# Patient Record
Sex: FEMALE | Race: WHITE | NOT HISPANIC OR LATINO | Employment: OTHER | URBAN - METROPOLITAN AREA
[De-identification: names, ages, dates, MRNs, and addresses within clinical notes are randomized per-mention and may not be internally consistent; named-entity substitution may affect disease eponyms.]

---

## 2017-02-21 RX ORDER — ROSUVASTATIN CALCIUM 10 MG/1
TABLET, COATED ORAL
Qty: 90 TABLET | Refills: 0 | Status: SHIPPED | OUTPATIENT
Start: 2017-02-21 | End: 2017-05-17 | Stop reason: SDUPTHER

## 2017-05-17 RX ORDER — VERAPAMIL HYDROCHLORIDE 240 MG/1
CAPSULE, EXTENDED RELEASE ORAL
Qty: 90 CAPSULE | Refills: 1 | Status: SHIPPED | OUTPATIENT
Start: 2017-05-17 | End: 2017-11-16 | Stop reason: SDUPTHER

## 2017-05-17 RX ORDER — ROSUVASTATIN CALCIUM 10 MG/1
TABLET, COATED ORAL
Qty: 90 TABLET | Refills: 3 | Status: SHIPPED | OUTPATIENT
Start: 2017-05-17 | End: 2018-07-01 | Stop reason: SDUPTHER

## 2017-05-18 ENCOUNTER — OFFICE VISIT (OUTPATIENT)
Dept: SPORTS MEDICINE | Facility: CLINIC | Age: 67
End: 2017-05-18

## 2017-05-18 VITALS
SYSTOLIC BLOOD PRESSURE: 112 MMHG | DIASTOLIC BLOOD PRESSURE: 74 MMHG | BODY MASS INDEX: 23.92 KG/M2 | HEART RATE: 68 BPM | OXYGEN SATURATION: 98 % | WEIGHT: 135 LBS | TEMPERATURE: 98.1 F | HEIGHT: 63 IN

## 2017-05-18 DIAGNOSIS — N39.0 RECURRENT UTI: ICD-10-CM

## 2017-05-18 DIAGNOSIS — N39.0 URINARY TRACT INFECTION, SITE UNSPECIFIED: Primary | ICD-10-CM

## 2017-05-18 LAB
BILIRUB BLD-MCNC: NEGATIVE MG/DL
CLARITY, POC: CLEAR
COLOR UR: YELLOW
GLUCOSE UR STRIP-MCNC: NEGATIVE MG/DL
KETONES UR QL: NEGATIVE
LEUKOCYTE EST, POC: NEGATIVE
NITRITE UR-MCNC: NEGATIVE MG/ML
PH UR: 5 [PH] (ref 5–8)
PROT UR STRIP-MCNC: NEGATIVE MG/DL
RBC # UR STRIP: ABNORMAL /UL
SP GR UR: 1.01 (ref 1–1.03)
UROBILINOGEN UR QL: NORMAL

## 2017-05-18 PROCEDURE — 81003 URINALYSIS AUTO W/O SCOPE: CPT | Performed by: FAMILY MEDICINE

## 2017-05-18 PROCEDURE — 99213 OFFICE O/P EST LOW 20 MIN: CPT | Performed by: FAMILY MEDICINE

## 2017-05-30 ENCOUNTER — LAB (OUTPATIENT)
Dept: SPORTS MEDICINE | Facility: CLINIC | Age: 67
End: 2017-05-30

## 2017-05-30 DIAGNOSIS — R35.0 FREQUENT URINATION: Primary | ICD-10-CM

## 2017-05-30 DIAGNOSIS — R35.0 FREQUENT URINATION: ICD-10-CM

## 2017-05-31 LAB
APPEARANCE UR: CLEAR
BACTERIA #/AREA URNS HPF: ABNORMAL /HPF
BILIRUB UR QL STRIP: NEGATIVE
CASTS URNS MICRO: ABNORMAL
CASTS URNS QL MICRO: PRESENT /LPF
COLOR UR: YELLOW
EPI CELLS #/AREA URNS HPF: ABNORMAL /HPF
GLUCOSE UR QL: NEGATIVE
HGB UR QL STRIP: NEGATIVE
KETONES UR QL STRIP: NEGATIVE
LEUKOCYTE ESTERASE UR QL STRIP: NEGATIVE
MICRO URNS: NORMAL
MICRO URNS: NORMAL
MUCOUS THREADS URNS QL MICRO: PRESENT /HPF
NITRITE UR QL STRIP: NEGATIVE
PH UR STRIP: 6 [PH] (ref 5–7.5)
PROT UR QL STRIP: NEGATIVE
RBC #/AREA URNS HPF: ABNORMAL /HPF
SP GR UR: 1.01 (ref 1–1.03)
URINALYSIS REFLEX: NORMAL
UROBILINOGEN UR STRIP-MCNC: 0.2 MG/DL (ref 0.2–1)
WBC #/AREA URNS HPF: ABNORMAL /HPF

## 2017-08-08 RX ORDER — SPIRONOLACTONE 50 MG/1
TABLET, FILM COATED ORAL
Qty: 90 TABLET | Refills: 1 | Status: SHIPPED | OUTPATIENT
Start: 2017-08-08 | End: 2018-02-11 | Stop reason: SDUPTHER

## 2017-08-08 RX ORDER — BUPROPION HYDROCHLORIDE 150 MG/1
TABLET ORAL
Qty: 90 TABLET | Refills: 1 | Status: SHIPPED | OUTPATIENT
Start: 2017-08-08 | End: 2018-02-11 | Stop reason: SDUPTHER

## 2017-10-12 ENCOUNTER — OFFICE VISIT (OUTPATIENT)
Dept: SPORTS MEDICINE | Facility: CLINIC | Age: 67
End: 2017-10-12

## 2017-10-12 VITALS
HEART RATE: 67 BPM | DIASTOLIC BLOOD PRESSURE: 74 MMHG | OXYGEN SATURATION: 98 % | SYSTOLIC BLOOD PRESSURE: 122 MMHG | HEIGHT: 63 IN | BODY MASS INDEX: 24.27 KG/M2 | WEIGHT: 137 LBS

## 2017-10-12 DIAGNOSIS — R30.0 BURNING WITH URINATION: Primary | ICD-10-CM

## 2017-10-12 LAB
BILIRUB BLD-MCNC: NEGATIVE MG/DL
CLARITY, POC: CLEAR
COLOR UR: YELLOW
GLUCOSE UR STRIP-MCNC: NEGATIVE MG/DL
KETONES UR QL: NEGATIVE
LEUKOCYTE EST, POC: NEGATIVE
NITRITE UR-MCNC: NEGATIVE MG/ML
PH UR: 5 [PH] (ref 5–8)
PROT UR STRIP-MCNC: NEGATIVE MG/DL
RBC # UR STRIP: ABNORMAL /UL
SP GR UR: 1.02 (ref 1–1.03)
UROBILINOGEN UR QL: NORMAL

## 2017-10-12 PROCEDURE — 99213 OFFICE O/P EST LOW 20 MIN: CPT | Performed by: FAMILY MEDICINE

## 2017-10-12 PROCEDURE — 81003 URINALYSIS AUTO W/O SCOPE: CPT | Performed by: FAMILY MEDICINE

## 2017-10-12 RX ORDER — CIPROFLOXACIN 500 MG/1
500 TABLET, FILM COATED ORAL 2 TIMES DAILY
Qty: 20 TABLET | Refills: 1 | Status: SHIPPED | OUTPATIENT
Start: 2017-10-12 | End: 2017-10-31

## 2017-10-12 NOTE — PROGRESS NOTES
"Karena is a 67 y.o. year old female    CC: UTI    History of Present Illness   HPI     Patient began having symptoms of UTI last week, had a few tablets of Cipro left over from her previous infection.  She has felt somewhat better however not completely well.  No fever or chills.  Has dysuria, frequency.  No back pain.      I have reviewed the patient's medical, family, and social history in detail and updated the computerized patient record.    Review of Systems   Constitutional: Negative.    HENT: Negative.    Respiratory: Negative.    Cardiovascular: Negative.    Genitourinary: Positive for dysuria and frequency.       /74  Pulse 67  Ht 63\" (160 cm)  Wt 137 lb (62.1 kg)  SpO2 98%  BMI 24.27 kg/m2     Physical Exam   Constitutional: She is oriented to person, place, and time. She appears well-developed and well-nourished.   HENT:   Head: Normocephalic and atraumatic.   Eyes: Conjunctivae and EOM are normal. Pupils are equal, round, and reactive to light.   Cardiovascular:   No peripheral edema   Pulmonary/Chest: Effort normal.   Neurological: She is alert and oriented to person, place, and time.   Skin: Skin is warm and dry.   Psychiatric: She has a normal mood and affect. Her behavior is normal.   Vitals reviewed.   urine dip here essentially normal.     Diagnoses and all orders for this visit:    Burning with urination  -     POCT urinalysis dipstick, automated  -     Urine Culture - Urine, Urine, Clean Catch  -     ciprofloxacin (CIPRO) 500 MG tablet; Take 1 tablet by mouth 2 (Two) Times a Day.      Subclinical UTI, will refill Cipro but also send urine for culture.    EMR Dragon/Transcription disclaimer:    Much of this encounter note is an electronic transcription/translation of spoken language to printed text.  The electronic translation of spoken language may permit erroneous, or at times, nonsensical words or phrases to be inadvertently transcribed.  Although I have reviewed the note for such " errors some may still exist.

## 2017-10-14 LAB
BACTERIA UR CULT: NO GROWTH
BACTERIA UR CULT: NORMAL

## 2017-10-20 DIAGNOSIS — Z11.59 NEED FOR HEPATITIS C SCREENING TEST: ICD-10-CM

## 2017-10-20 DIAGNOSIS — Z13.29 SCREENING FOR THYROID DISORDER: ICD-10-CM

## 2017-10-20 DIAGNOSIS — Z00.00 PREVENTATIVE HEALTH CARE: Primary | ICD-10-CM

## 2017-10-20 DIAGNOSIS — E78.5 HYPERLIPIDEMIA, UNSPECIFIED HYPERLIPIDEMIA TYPE: ICD-10-CM

## 2017-10-20 DIAGNOSIS — I10 ESSENTIAL HYPERTENSION: ICD-10-CM

## 2017-10-24 LAB
ALBUMIN SERPL-MCNC: 4.7 G/DL (ref 3.5–5.2)
ALBUMIN/GLOB SERPL: 2 G/DL
ALP SERPL-CCNC: 94 U/L (ref 39–117)
ALT SERPL-CCNC: 14 U/L (ref 1–33)
APPEARANCE UR: CLEAR
AST SERPL-CCNC: 17 U/L (ref 1–32)
BACTERIA #/AREA URNS HPF: ABNORMAL /HPF
BASOPHILS # BLD AUTO: 0.05 10*3/MM3 (ref 0–0.2)
BASOPHILS NFR BLD AUTO: 0.7 % (ref 0–1.5)
BILIRUB SERPL-MCNC: 0.3 MG/DL (ref 0.1–1.2)
BILIRUB UR QL STRIP: NEGATIVE
BUN SERPL-MCNC: 9 MG/DL (ref 8–23)
BUN/CREAT SERPL: 10.6 (ref 7–25)
CALCIUM SERPL-MCNC: 9.9 MG/DL (ref 8.6–10.5)
CASTS URNS MICRO: ABNORMAL
CASTS URNS QL MICRO: PRESENT /LPF
CHLORIDE SERPL-SCNC: 100 MMOL/L (ref 98–107)
CHOLEST SERPL-MCNC: 137 MG/DL (ref 0–200)
CHOLEST/HDLC SERPL: 2.58 {RATIO}
CO2 SERPL-SCNC: 26.7 MMOL/L (ref 22–29)
COLOR UR: YELLOW
CREAT SERPL-MCNC: 0.85 MG/DL (ref 0.57–1)
EOSINOPHIL # BLD AUTO: 0.38 10*3/MM3 (ref 0–0.7)
EOSINOPHIL NFR BLD AUTO: 5.3 % (ref 0.3–6.2)
EPI CELLS #/AREA URNS HPF: ABNORMAL /HPF
ERYTHROCYTE [DISTWIDTH] IN BLOOD BY AUTOMATED COUNT: 13.2 % (ref 11.7–13)
GFR SERPLBLD CREATININE-BSD FMLA CKD-EPI: 66 ML/MIN/1.73
GFR SERPLBLD CREATININE-BSD FMLA CKD-EPI: 80 ML/MIN/1.73
GLOBULIN SER CALC-MCNC: 2.4 GM/DL
GLUCOSE SERPL-MCNC: 88 MG/DL (ref 65–99)
GLUCOSE UR QL: NEGATIVE
HCT VFR BLD AUTO: 46.6 % (ref 35.6–45.5)
HCV AB S/CO SERPL IA: <0.1 S/CO RATIO (ref 0–0.9)
HDLC SERPL-MCNC: 53 MG/DL (ref 40–60)
HGB BLD-MCNC: 14.4 G/DL (ref 11.9–15.5)
HGB UR QL STRIP: NEGATIVE
IMM GRANULOCYTES # BLD: 0.04 10*3/MM3 (ref 0–0.03)
IMM GRANULOCYTES NFR BLD: 0.6 % (ref 0–0.5)
KETONES UR QL STRIP: NEGATIVE
LDLC SERPL CALC-MCNC: 63 MG/DL (ref 0–100)
LEUKOCYTE ESTERASE UR QL STRIP: NEGATIVE
LYMPHOCYTES # BLD AUTO: 2.1 10*3/MM3 (ref 0.9–4.8)
LYMPHOCYTES NFR BLD AUTO: 29.3 % (ref 19.6–45.3)
MCH RBC QN AUTO: 28.1 PG (ref 26.9–32)
MCHC RBC AUTO-ENTMCNC: 30.9 G/DL (ref 32.4–36.3)
MCV RBC AUTO: 90.8 FL (ref 80.5–98.2)
MICRO URNS: NORMAL
MICRO URNS: NORMAL
MONOCYTES # BLD AUTO: 0.58 10*3/MM3 (ref 0.2–1.2)
MONOCYTES NFR BLD AUTO: 8.1 % (ref 5–12)
MUCOUS THREADS URNS QL MICRO: PRESENT /HPF
NEUTROPHILS # BLD AUTO: 4.01 10*3/MM3 (ref 1.9–8.1)
NEUTROPHILS NFR BLD AUTO: 56 % (ref 42.7–76)
NITRITE UR QL STRIP: NEGATIVE
PH UR STRIP: 6 [PH] (ref 5–7.5)
PLATELET # BLD AUTO: 277 10*3/MM3 (ref 140–500)
POTASSIUM SERPL-SCNC: 5.3 MMOL/L (ref 3.5–5.2)
PROT SERPL-MCNC: 7.1 G/DL (ref 6–8.5)
PROT UR QL STRIP: NEGATIVE
RBC # BLD AUTO: 5.13 10*6/MM3 (ref 3.9–5.2)
RBC #/AREA URNS HPF: ABNORMAL /HPF
SODIUM SERPL-SCNC: 142 MMOL/L (ref 136–145)
SP GR UR: 1.01 (ref 1–1.03)
T4 FREE SERPL-MCNC: 1.16 NG/DL (ref 0.93–1.7)
TRIGL SERPL-MCNC: 104 MG/DL (ref 0–150)
TSH SERPL DL<=0.005 MIU/L-ACNC: 2.72 MIU/ML (ref 0.27–4.2)
URINALYSIS REFLEX: NORMAL
UROBILINOGEN UR STRIP-MCNC: 0.2 MG/DL (ref 0.2–1)
VLDLC SERPL CALC-MCNC: 20.8 MG/DL (ref 5–40)
WBC # BLD AUTO: 7.16 10*3/MM3 (ref 4.5–10.7)
WBC #/AREA URNS HPF: ABNORMAL /HPF

## 2017-10-25 ENCOUNTER — RESULTS ENCOUNTER (OUTPATIENT)
Dept: SPORTS MEDICINE | Facility: CLINIC | Age: 67
End: 2017-10-25

## 2017-10-25 DIAGNOSIS — Z11.59 NEED FOR HEPATITIS C SCREENING TEST: ICD-10-CM

## 2017-10-25 DIAGNOSIS — E78.5 HYPERLIPIDEMIA, UNSPECIFIED HYPERLIPIDEMIA TYPE: ICD-10-CM

## 2017-10-25 DIAGNOSIS — I10 ESSENTIAL HYPERTENSION: ICD-10-CM

## 2017-10-25 DIAGNOSIS — Z00.00 PREVENTATIVE HEALTH CARE: ICD-10-CM

## 2017-10-25 DIAGNOSIS — Z13.29 SCREENING FOR THYROID DISORDER: ICD-10-CM

## 2017-10-31 ENCOUNTER — OFFICE VISIT (OUTPATIENT)
Dept: SPORTS MEDICINE | Facility: CLINIC | Age: 67
End: 2017-10-31

## 2017-10-31 VITALS
BODY MASS INDEX: 23.9 KG/M2 | OXYGEN SATURATION: 98 % | DIASTOLIC BLOOD PRESSURE: 74 MMHG | WEIGHT: 140 LBS | HEIGHT: 64 IN | SYSTOLIC BLOOD PRESSURE: 120 MMHG | HEART RATE: 85 BPM

## 2017-10-31 DIAGNOSIS — Z00.00 MEDICARE ANNUAL WELLNESS VISIT, SUBSEQUENT: Primary | ICD-10-CM

## 2017-10-31 PROCEDURE — G0439 PPPS, SUBSEQ VISIT: HCPCS | Performed by: FAMILY MEDICINE

## 2017-10-31 NOTE — PROGRESS NOTES
QUICK REFERENCE INFORMATION:  The ABCs of the Annual Wellness Visit    Subsequent Medicare Wellness Visit    HEALTH RISK ASSESSMENT    1950    Recent Hospitalizations:  No hospitalization(s) within the last year..        Current Medical Providers:  Patient Care Team:  Hakan Ortiz MD as PCP - General  Hakan Ortiz MD as PCP - Family Medicine        Smoking Status:  History   Smoking Status   • Former Smoker   Smokeless Tobacco   • Never Used       Alcohol Consumption:  History   Alcohol Use No       Depression Screen:   PHQ-2/PHQ-9 Depression Screening 10/31/2017   Little interest or pleasure in doing things 0   Feeling down, depressed, or hopeless 0   Total Score 0       Health Habits and Functional and Cognitive Screening:  Functional & Cognitive Status 10/31/2017   Do you have difficulty preparing food and eating? No   Do you have difficulty bathing yourself, getting dressed or grooming yourself? No   Do you have difficulty using the toilet? No   Do you have difficulty moving around from place to place? No   Do you have trouble with steps or getting out of a bed or a chair? No   In the past year have you fallen or experienced a near fall? No   Current Diet Well Balanced Diet   Dental Exam Up to date   Eye Exam Up to date   Exercise (times per week) 1 times per week   Current Exercise Activities Include Walking   Do you need help using the phone?  No   Are you deaf or do you have serious difficulty hearing?  No   Do you need help with transportation? No   Do you need help shopping? No   Do you need help preparing meals?  No   Do you need help with housework?  No   Do you need help with laundry? No   Do you need help taking your medications? No   Do you need help managing money? No   Have you felt unusual stress, anger or loneliness in the last month? No   Who do you live with? Spouse   If you need help, do you have trouble finding someone available to you? No   Have you been bothered in  the last four weeks by sexual problems? No   Do you have difficulty concentrating, remembering or making decisions? No           Does the patient have evidence of cognitive impairment? Yes    Aspirin use counseling: Taking ASA appropriately as indicated      Recent Lab Results:  CMP:  Lab Results   Component Value Date    GLU 88 10/23/2017    BUN 9 10/23/2017    CREATININE 0.85 10/23/2017    EGFRIFNONA 66 10/23/2017    EGFRIFAFRI 80 10/23/2017    BCR 10.6 10/23/2017     10/23/2017    K 5.3 (H) 10/23/2017    CO2 26.7 10/23/2017    CALCIUM 9.9 10/23/2017    PROTENTOTREF 7.1 10/23/2017    ALBUMIN 4.70 10/23/2017    LABGLOBREF 2.4 10/23/2017    LABIL2 2.0 10/23/2017    BILITOT 0.3 10/23/2017    ALKPHOS 94 10/23/2017    AST 17 10/23/2017    ALT 14 10/23/2017     Lipid Panel:  Lab Results   Component Value Date    TRIG 104 10/23/2017    HDL 53 10/23/2017    VLDL 20.8 10/23/2017     HbA1c:       Visual Acuity:  No exam data present    Age-appropriate Screening Schedule:  Refer to the list below for future screening recommendations based on patient's age, sex and/or medical conditions. Orders for these recommended tests are listed in the plan section. The patient has been provided with a written plan.    Health Maintenance   Topic Date Due   • MAMMOGRAM  03/21/2016   • COLONOSCOPY  03/21/2016   • ZOSTER VACCINE  05/18/2017   • PNEUMOCOCCAL VACCINES (65+ LOW/MEDIUM RISK) (2 of 2 - PPSV23) 11/17/2017   • LIPID PANEL  10/23/2018   • TDAP/TD VACCINES (2 - Td) 10/25/2026   • INFLUENZA VACCINE  Completed        Subjective   History of Present Illness    Karena Perez is a 67 y.o. female who presents for an Subsequent Wellness Visit.    The following portions of the patient's history were reviewed and updated as appropriate: allergies, current medications, past family history, past medical history, past social history, past surgical history and problem list.    Outpatient Medications Prior to Visit   Medication Sig Dispense  "Refill   • aspirin 81 MG tablet Take  by mouth.     • Biotin 10 MG tablet Take  by mouth.     • buPROPion XL (WELLBUTRIN XL) 150 MG 24 hr tablet TAKE 1 TABLET DAILY AS DIRECTED 90 tablet 1   • Cetirizine HCl 10 MG capsule Take  by mouth.     • ciprofloxacin (CIPRO) 500 MG tablet Take 1 tablet by mouth 2 (Two) Times a Day. 20 tablet 1   • dicyclomine (BENTYL) 10 MG capsule 1 q 6 h prn abdominal pain 120 capsule 11   • famciclovir (FAMVIR) 500 MG tablet Take 1 tablet by mouth 3 (three) times a day. 21 tablet 0   • gabapentin (NEURONTIN) 300 MG capsule Take 1 capsule by mouth 3 (three) times a day. 90 capsule 1   • meclizine (ANTIVERT) 25 MG tablet Take 1 tablet by mouth 3 (Three) Times a Day As Needed for dizziness. 30 tablet 3   • metaxalone (SKELAXIN) 800 MG tablet Take  by mouth.     • rosuvastatin (CRESTOR) 10 MG tablet TAKE 1 TABLET DAILY 90 tablet 3   • spironolactone (ALDACTONE) 50 MG tablet TAKE 1 TABLET DAILY 90 tablet 1   • verapamil (VERELAN) 240 MG 24 hr capsule TAKE 1 CAPSULE DAILY 90 capsule 1     No facility-administered medications prior to visit.        Patient Active Problem List   Diagnosis   • Complicated migraine   • Depression   • Hyperlipidemia   • Hypertension       Advance Care Planning:  has an advance directive - a copy has been provided and is in file    Identification of Risk Factors:  Risk factors include: cardiovascular risk.    Review of Systems    Compared to one year ago, the patient feels her physical health is the same.  Compared to one year ago, the patient feels her mental health is the same.    Objective     Physical Exam    Vitals:    10/31/17 0942   BP: 120/74   Pulse: 85   SpO2: 98%   Weight: 140 lb (63.5 kg)   Height: 64\" (162.6 cm)       Body mass index is 24.03 kg/(m^2).  Discussed the patient's BMI with her. The BMI is in the acceptable range.    Assessment/Plan   Patient Self-Management and Personalized Health Advice  The patient has been provided with information about: " prevention of cardiac or vascular disease and preventive services including:   · Counseling for cardiovascular disease risk reduction.    Visit Diagnoses:    ICD-10-CM ICD-9-CM   1. Medicare annual wellness visit, subsequent Z00.00 V70.0       No orders of the defined types were placed in this encounter.      Outpatient Encounter Prescriptions as of 10/31/2017   Medication Sig Dispense Refill   • aspirin 81 MG tablet Take  by mouth.     • Biotin 10 MG tablet Take  by mouth.     • buPROPion XL (WELLBUTRIN XL) 150 MG 24 hr tablet TAKE 1 TABLET DAILY AS DIRECTED 90 tablet 1   • Cetirizine HCl 10 MG capsule Take  by mouth.     • ciprofloxacin (CIPRO) 500 MG tablet Take 1 tablet by mouth 2 (Two) Times a Day. 20 tablet 1   • dicyclomine (BENTYL) 10 MG capsule 1 q 6 h prn abdominal pain 120 capsule 11   • famciclovir (FAMVIR) 500 MG tablet Take 1 tablet by mouth 3 (three) times a day. 21 tablet 0   • gabapentin (NEURONTIN) 300 MG capsule Take 1 capsule by mouth 3 (three) times a day. 90 capsule 1   • meclizine (ANTIVERT) 25 MG tablet Take 1 tablet by mouth 3 (Three) Times a Day As Needed for dizziness. 30 tablet 3   • metaxalone (SKELAXIN) 800 MG tablet Take  by mouth.     • rosuvastatin (CRESTOR) 10 MG tablet TAKE 1 TABLET DAILY 90 tablet 3   • spironolactone (ALDACTONE) 50 MG tablet TAKE 1 TABLET DAILY 90 tablet 1   • verapamil (VERELAN) 240 MG 24 hr capsule TAKE 1 CAPSULE DAILY 90 capsule 1     No facility-administered encounter medications on file as of 10/31/2017.        Reviewed use of high risk medication in the elderly: not applicable  Reviewed for potential of harmful drug interactions in the elderly: not applicable    Follow Up:  No Follow-up on file.     An After Visit Summary and PPPS with all of these plans were given to the patient.

## 2017-11-16 RX ORDER — VERAPAMIL HYDROCHLORIDE 240 MG/1
CAPSULE, EXTENDED RELEASE ORAL
Qty: 90 CAPSULE | Refills: 1 | Status: SHIPPED | OUTPATIENT
Start: 2017-11-16 | End: 2018-07-01 | Stop reason: SDUPTHER

## 2018-01-10 ENCOUNTER — OFFICE VISIT (OUTPATIENT)
Dept: SPORTS MEDICINE | Facility: CLINIC | Age: 68
End: 2018-01-10

## 2018-01-10 VITALS
OXYGEN SATURATION: 95 % | HEART RATE: 75 BPM | DIASTOLIC BLOOD PRESSURE: 84 MMHG | BODY MASS INDEX: 24.24 KG/M2 | TEMPERATURE: 98.1 F | SYSTOLIC BLOOD PRESSURE: 122 MMHG | HEIGHT: 64 IN | WEIGHT: 142 LBS

## 2018-01-10 DIAGNOSIS — K11.20 SIALADENITIS: Primary | ICD-10-CM

## 2018-01-10 PROCEDURE — 99213 OFFICE O/P EST LOW 20 MIN: CPT | Performed by: FAMILY MEDICINE

## 2018-01-10 RX ORDER — AMOXICILLIN AND CLAVULANATE POTASSIUM 875; 125 MG/1; MG/1
1 TABLET, FILM COATED ORAL EVERY 12 HOURS SCHEDULED
Qty: 20 TABLET | Refills: 0 | Status: SHIPPED | OUTPATIENT
Start: 2018-01-10 | End: 2018-06-04

## 2018-01-10 NOTE — PROGRESS NOTES
"Karena is a 67 y.o. year old female    Chief Complaint   Patient presents with   • Facial Swelling       History of Present Illness   Facial Swelling   This is a new problem. The current episode started yesterday. Associated symptoms include congestion, coughing and a sore throat. Pertinent negatives include no abdominal pain, fever, headaches, neck pain, swollen glands or vomiting.   Sore Throat    This is a new problem. The current episode started 1 to 4 weeks ago. The problem has been waxing and waning. The pain is worse on the left side. There has been no fever. Associated symptoms include congestion and coughing. Pertinent negatives include no abdominal pain, diarrhea, drooling, ear discharge, ear pain, headaches, hoarse voice, plugged ear sensation, neck pain, shortness of breath, stridor, swollen glands, trouble swallowing or vomiting. She has had no exposure to strep or mono.    Started with cold about 4 weeks ago with scratchy throat and clear drainage and dry cough. No fever. Cold improved but is left with \"irratation\" L side of throat and possible swelling under the L jaw. Occ worse with eating. No reflux.     I have reviewed the patient's medical, family, and social history in detail and updated the computerized patient record.    Review of Systems   Constitutional: Negative for fever and unexpected weight change.   HENT: Positive for congestion, facial swelling and sore throat. Negative for drooling, ear discharge, ear pain, hoarse voice and trouble swallowing.    Respiratory: Positive for cough. Negative for shortness of breath and stridor.    Gastrointestinal: Negative for abdominal pain, diarrhea and vomiting.   Musculoskeletal: Negative for neck pain.   Neurological: Negative for headaches.       /84  Pulse 75  Temp 98.1 °F (36.7 °C)  Ht 162.6 cm (64\")  Wt 64.4 kg (142 lb)  SpO2 95%  BMI 24.37 kg/m2     Physical Exam   Constitutional: She appears well-developed and well-nourished.   HENT: "   Head: Normocephalic and atraumatic.   Right Ear: External ear normal.   Left Ear: External ear normal.   Nose: Nose normal.   Mouth/Throat: Oropharynx is clear and moist. No oropharyngeal exudate.   Eyes: Conjunctivae are normal.   Neck: Neck supple. No thyromegaly present.   Mild swelling and tenderness to palpation L submandibular gland. Duct is not swollen or tender. No LA   Lymphadenopathy:     She has no cervical adenopathy.   Vitals reviewed.       Diagnoses and all orders for this visit:    Sialadenitis  -     amoxicillin-clavulanate (AUGMENTIN) 875-125 MG per tablet; Take 1 tablet by mouth Every 12 (Twelve) Hours. WITH FOOD     Also recommend lemon drops or dill pickles several times a day  Refer to ENT if not resolved in 5-7 days      EMR Dragon/Transcription disclaimer:    Much of this encounter note is an electronic transcription/translation of spoken language to printed text.  The electronic translation of spoken language may permit erroneous, or at times, nonsensical words or phrases to be inadvertently transcribed.  Although I have reviewed the note for such errors some may still exist.

## 2018-01-10 NOTE — PROGRESS NOTES
Answers for HPI/ROS submitted by the patient on 1/8/2018   Sore throat  Chronicity: new  Onset: 1 to 4 weeks ago  Progression since onset: waxing and waning  Pain worse on: left  Fever: no fever  abdominal pain: No  congestion: Yes  cough: Yes  diarrhea: No  drooling: No  ear discharge: No  ear pain: No  headaches: No  hoarse voice: No  neck pain: No  plugged ear sensation: No  shortness of breath: No  stridor: No  swollen glands: No  trouble swallowing: No  vomiting: No  strep: No  mono: No

## 2018-01-25 ENCOUNTER — TELEPHONE (OUTPATIENT)
Dept: SPORTS MEDICINE | Facility: CLINIC | Age: 68
End: 2018-01-25

## 2018-01-25 DIAGNOSIS — K11.20 SIALADENITIS: Primary | ICD-10-CM

## 2018-01-30 ENCOUNTER — APPOINTMENT (OUTPATIENT)
Dept: WOMENS IMAGING | Facility: HOSPITAL | Age: 68
End: 2018-01-30

## 2018-01-30 PROCEDURE — 77067 SCR MAMMO BI INCL CAD: CPT | Performed by: RADIOLOGY

## 2018-02-12 RX ORDER — BUPROPION HYDROCHLORIDE 150 MG/1
TABLET ORAL
Qty: 90 TABLET | Refills: 1 | Status: SHIPPED | OUTPATIENT
Start: 2018-02-12 | End: 2018-10-24 | Stop reason: SDUPTHER

## 2018-02-12 RX ORDER — SPIRONOLACTONE 50 MG/1
TABLET, FILM COATED ORAL
Qty: 90 TABLET | Refills: 1 | Status: SHIPPED | OUTPATIENT
Start: 2018-02-12 | End: 2018-09-05 | Stop reason: SDUPTHER

## 2018-05-30 ENCOUNTER — APPOINTMENT (OUTPATIENT)
Dept: GENERAL RADIOLOGY | Facility: HOSPITAL | Age: 68
End: 2018-05-30

## 2018-05-30 ENCOUNTER — HOSPITAL ENCOUNTER (EMERGENCY)
Facility: HOSPITAL | Age: 68
Discharge: HOME | End: 2018-05-30
Attending: EMERGENCY MEDICINE | Admitting: EMERGENCY MEDICINE

## 2018-05-30 VITALS
HEIGHT: 63 IN | HEART RATE: 68 BPM | TEMPERATURE: 98.4 F | BODY MASS INDEX: 23.92 KG/M2 | OXYGEN SATURATION: 95 % | DIASTOLIC BLOOD PRESSURE: 64 MMHG | WEIGHT: 135 LBS | SYSTOLIC BLOOD PRESSURE: 110 MMHG | RESPIRATION RATE: 16 BRPM

## 2018-05-30 VITALS
DIASTOLIC BLOOD PRESSURE: 63 MMHG | BODY MASS INDEX: 23.92 KG/M2 | OXYGEN SATURATION: 97 % | HEIGHT: 63 IN | SYSTOLIC BLOOD PRESSURE: 120 MMHG | WEIGHT: 135 LBS | HEART RATE: 72 BPM | RESPIRATION RATE: 20 BRPM | TEMPERATURE: 98 F

## 2018-05-30 DIAGNOSIS — R13.10 DYSPHAGIA, UNSPECIFIED TYPE: ICD-10-CM

## 2018-05-30 DIAGNOSIS — K22.4 ESOPHAGEAL SPASM: Primary | ICD-10-CM

## 2018-05-30 PROCEDURE — 99283 EMERGENCY DEPT VISIT LOW MDM: CPT

## 2018-05-30 PROCEDURE — 93005 ELECTROCARDIOGRAM TRACING: CPT | Performed by: EMERGENCY MEDICINE

## 2018-05-30 PROCEDURE — 93010 ELECTROCARDIOGRAM REPORT: CPT | Performed by: INTERNAL MEDICINE

## 2018-05-30 PROCEDURE — 96374 THER/PROPH/DIAG INJ IV PUSH: CPT

## 2018-05-30 PROCEDURE — 25010000002 ONDANSETRON PER 1 MG: Performed by: EMERGENCY MEDICINE

## 2018-05-30 PROCEDURE — 99285 EMERGENCY DEPT VISIT HI MDM: CPT

## 2018-05-30 PROCEDURE — 25010000002 LORAZEPAM PER 2 MG: Performed by: EMERGENCY MEDICINE

## 2018-05-30 PROCEDURE — 71046 X-RAY EXAM CHEST 2 VIEWS: CPT

## 2018-05-30 PROCEDURE — 93005 ELECTROCARDIOGRAM TRACING: CPT

## 2018-05-30 PROCEDURE — 96375 TX/PRO/DX INJ NEW DRUG ADDON: CPT

## 2018-05-30 PROCEDURE — 25010000002 MORPHINE PER 10 MG: Performed by: EMERGENCY MEDICINE

## 2018-05-30 PROCEDURE — 25010000002 METOCLOPRAMIDE PER 10 MG: Performed by: EMERGENCY MEDICINE

## 2018-05-30 RX ORDER — ALUMINA, MAGNESIA, AND SIMETHICONE 2400; 2400; 240 MG/30ML; MG/30ML; MG/30ML
15 SUSPENSION ORAL ONCE
Status: COMPLETED | OUTPATIENT
Start: 2018-05-30 | End: 2018-05-30

## 2018-05-30 RX ORDER — LORAZEPAM 2 MG/ML
1 INJECTION INTRAMUSCULAR ONCE
Status: COMPLETED | OUTPATIENT
Start: 2018-05-30 | End: 2018-05-30

## 2018-05-30 RX ORDER — NITROGLYCERIN 0.4 MG/1
0.4 TABLET SUBLINGUAL
Status: COMPLETED | OUTPATIENT
Start: 2018-05-30 | End: 2018-05-30

## 2018-05-30 RX ORDER — SODIUM CHLORIDE 0.9 % (FLUSH) 0.9 %
10 SYRINGE (ML) INJECTION AS NEEDED
Status: DISCONTINUED | OUTPATIENT
Start: 2018-05-30 | End: 2018-05-30 | Stop reason: HOSPADM

## 2018-05-30 RX ORDER — LORAZEPAM 1 MG/1
1 TABLET ORAL 2 TIMES DAILY PRN
Qty: 15 TABLET | Refills: 0 | Status: SHIPPED | OUTPATIENT
Start: 2018-05-30 | End: 2018-05-30 | Stop reason: SDUPTHER

## 2018-05-30 RX ORDER — METOCLOPRAMIDE HYDROCHLORIDE 5 MG/ML
10 INJECTION INTRAMUSCULAR; INTRAVENOUS ONCE
Status: COMPLETED | OUTPATIENT
Start: 2018-05-30 | End: 2018-05-30

## 2018-05-30 RX ORDER — LORAZEPAM 1 MG/1
1 TABLET ORAL EVERY 8 HOURS PRN
Qty: 15 TABLET | Refills: 0 | Status: SHIPPED | OUTPATIENT
Start: 2018-05-30 | End: 2021-06-09

## 2018-05-30 RX ORDER — ONDANSETRON 2 MG/ML
4 INJECTION INTRAMUSCULAR; INTRAVENOUS ONCE
Status: COMPLETED | OUTPATIENT
Start: 2018-05-30 | End: 2018-05-30

## 2018-05-30 RX ORDER — LORAZEPAM 2 MG/ML
1 INJECTION INTRAMUSCULAR ONCE
Status: DISCONTINUED | OUTPATIENT
Start: 2018-05-30 | End: 2018-05-30 | Stop reason: HOSPADM

## 2018-05-30 RX ORDER — MORPHINE SULFATE 2 MG/ML
2 INJECTION, SOLUTION INTRAMUSCULAR; INTRAVENOUS ONCE
Status: COMPLETED | OUTPATIENT
Start: 2018-05-30 | End: 2018-05-30

## 2018-05-30 RX ORDER — METOCLOPRAMIDE 10 MG/1
10 TABLET ORAL
Qty: 30 TABLET | Refills: 0 | Status: SHIPPED | OUTPATIENT
Start: 2018-05-30 | End: 2021-06-09

## 2018-05-30 RX ADMIN — ALUMINUM HYDROXIDE, MAGNESIUM HYDROXIDE, AND DIMETHICONE 15 ML: 400; 400; 40 SUSPENSION ORAL at 02:23

## 2018-05-30 RX ADMIN — NITROGLYCERIN 0.4 MG: 0.4 TABLET SUBLINGUAL at 02:39

## 2018-05-30 RX ADMIN — MORPHINE SULFATE 2 MG: 2 INJECTION, SOLUTION INTRAMUSCULAR; INTRAVENOUS at 04:00

## 2018-05-30 RX ADMIN — SODIUM CHLORIDE 1000 ML: 9 INJECTION, SOLUTION INTRAVENOUS at 02:31

## 2018-05-30 RX ADMIN — ONDANSETRON 4 MG: 2 INJECTION INTRAMUSCULAR; INTRAVENOUS at 03:58

## 2018-05-30 RX ADMIN — NITROGLYCERIN 0.4 MG: 0.4 TABLET SUBLINGUAL at 02:32

## 2018-05-30 RX ADMIN — METOCLOPRAMIDE 10 MG: 5 INJECTION, SOLUTION INTRAMUSCULAR; INTRAVENOUS at 06:02

## 2018-05-30 RX ADMIN — LIDOCAINE HYDROCHLORIDE 15 ML: 20 SOLUTION ORAL; TOPICAL at 02:23

## 2018-05-30 RX ADMIN — NITROGLYCERIN 0.4 MG: 0.4 TABLET SUBLINGUAL at 02:47

## 2018-05-30 RX ADMIN — LORAZEPAM 1 MG: 2 INJECTION INTRAMUSCULAR; INTRAVENOUS at 06:02

## 2018-06-01 ENCOUNTER — TELEPHONE (OUTPATIENT)
Dept: SOCIAL WORK | Facility: HOSPITAL | Age: 68
End: 2018-06-01

## 2018-06-01 NOTE — TELEPHONE ENCOUNTER
F/u phone call; spoke w/pt; Pt reports feeling better; RX filled; taking PRN, taking Prilosec regularly. Also has Mylanta on standby if needed. Scheduled appt w/PCP for Monday 6/4/18. No further needs at this time. Batsheva Aguiar RN

## 2018-06-04 ENCOUNTER — OFFICE VISIT (OUTPATIENT)
Dept: SPORTS MEDICINE | Facility: CLINIC | Age: 68
End: 2018-06-04

## 2018-06-04 VITALS
SYSTOLIC BLOOD PRESSURE: 122 MMHG | OXYGEN SATURATION: 97 % | HEART RATE: 85 BPM | BODY MASS INDEX: 23.92 KG/M2 | TEMPERATURE: 98.5 F | DIASTOLIC BLOOD PRESSURE: 72 MMHG | WEIGHT: 135 LBS | HEIGHT: 63 IN

## 2018-06-04 DIAGNOSIS — N30.00 ACUTE CYSTITIS WITHOUT HEMATURIA: ICD-10-CM

## 2018-06-04 DIAGNOSIS — R13.10 ODYNOPHAGIA: Primary | ICD-10-CM

## 2018-06-04 PROCEDURE — 99214 OFFICE O/P EST MOD 30 MIN: CPT | Performed by: FAMILY MEDICINE

## 2018-06-04 RX ORDER — HEPATITIS A VACCINE 1440 [IU]/ML
INJECTION, SUSPENSION INTRAMUSCULAR
Refills: 0 | COMMUNITY
Start: 2018-04-28 | End: 2019-01-25

## 2018-06-04 RX ORDER — FAMOTIDINE 20 MG/1
20 TABLET, FILM COATED ORAL 2 TIMES DAILY
COMMUNITY
End: 2019-05-10

## 2018-06-04 NOTE — PROGRESS NOTES
"Karena is a 68 y.o. year old female    Chief Complaint   Patient presents with   • Follow-up     ER       History of Present Illness   HPI   1.  One day last week patient was eating supper with her , took a drink from a diet Coke can swallow some air with it creating significant pain in her right anterior chest.  Pain did not resolve and was having pain even with swallowing saliva.  Patient went to the emergency room was given a GI cocktail along with morphine none of which seem to really improve her pain however she did decide to go home but on the way home began having a return of \"what was once diagnoses pyloric spasms\" she went back to the emergency room was given IV Reglan which did improve her symptoms.  She continues to have some soreness in the right anterior chest but no odynophagia or dysphagia at this time.  She cannot get into her gastroenterologist for 3 months. I have reviewed her ER visit from 5/30/2018 and is summarized above.   2.  Recurring UTI. Has been off antibiotics for two weeks now. Has been on antibiotics 9 times in past 18 months. She wonders if she should ask her GYN about estrogen cream.     I have reviewed the patient's medical, family, and social history in detail and updated the computerized patient record.    Review of Systems   Constitutional: Negative for fever.   Gastrointestinal: Positive for abdominal pain. Negative for constipation, diarrhea, nausea and vomiting.        Per HPI   Genitourinary: Negative for dysuria, frequency and hematuria.   Musculoskeletal: Negative for arthralgias and myalgias.   Neurological: Negative for headaches.       /72 (BP Location: Left arm, Patient Position: Sitting, Cuff Size: Adult)   Pulse 85   Temp 98.5 °F (36.9 °C) (Oral)   Ht 160 cm (62.99\")   Wt 61.2 kg (135 lb)   SpO2 97%   BMI 23.92 kg/m²      Physical Exam   Constitutional: She is oriented to person, place, and time. She appears well-developed and well-nourished.   HENT: "   Head: Normocephalic and atraumatic.   Eyes: Conjunctivae and EOM are normal. Pupils are equal, round, and reactive to light.   Neck: Normal range of motion. Neck supple. No thyromegaly present.   Cardiovascular: Normal rate, regular rhythm and normal heart sounds.    No peripheral edema   Pulmonary/Chest: Effort normal and breath sounds normal.   Abdominal: Soft. Bowel sounds are normal.   Neurological: She is alert and oriented to person, place, and time.   Skin: Skin is warm, dry and intact.   Psychiatric: She has a normal mood and affect. Her behavior is normal. Thought content normal.   Vitals reviewed.       Diagnoses and all orders for this visit:    Odynophagia  -     Ambulatory Referral to Gastroenterology    Acute cystitis without hematuria  -     UA / M With / Rflx Culture([facility] ONLY) - Urine, Clean Catch    Other orders  -     HAVRIX 1440 EL U/ML vaccine; ADM 1ML IM UTD  -     famotidine (PEPCID) 20 MG tablet; Take 20 mg by mouth 2 (Two) Times a Day.         She will d/w her GYN about Estrogen vag cream.     EMR Dragon/Transcription disclaimer:    Much of this encounter note is an electronic transcription/translation of spoken language to printed text.  The electronic translation of spoken language may permit erroneous, or at times, nonsensical words or phrases to be inadvertently transcribed.  Although I have reviewed the note for such errors some may still exist.

## 2018-06-05 LAB
APPEARANCE UR: CLEAR
BACTERIA #/AREA URNS HPF: NORMAL /HPF
BILIRUB UR QL STRIP: NEGATIVE
COLOR UR: YELLOW
EPI CELLS #/AREA URNS HPF: NORMAL /HPF
GLUCOSE UR QL: NEGATIVE
HGB UR QL STRIP: NEGATIVE
KETONES UR QL STRIP: NEGATIVE
LEUKOCYTE ESTERASE UR QL STRIP: NEGATIVE
MICRO URNS: NORMAL
MICRO URNS: NORMAL
MUCOUS THREADS URNS QL MICRO: PRESENT /HPF
NITRITE UR QL STRIP: NEGATIVE
PH UR STRIP: 5.5 [PH] (ref 5–7.5)
PROT UR QL STRIP: NEGATIVE
RBC #/AREA URNS HPF: NORMAL /HPF
SP GR UR: 1.01 (ref 1–1.03)
URINALYSIS REFLEX: NORMAL
UROBILINOGEN UR STRIP-MCNC: 0.2 MG/DL (ref 0.2–1)
WBC #/AREA URNS HPF: NORMAL /HPF

## 2018-07-02 RX ORDER — ROSUVASTATIN CALCIUM 10 MG/1
TABLET, COATED ORAL
Qty: 90 TABLET | Refills: 3 | Status: SHIPPED | OUTPATIENT
Start: 2018-07-02 | End: 2019-06-11 | Stop reason: SDUPTHER

## 2018-07-02 RX ORDER — VERAPAMIL HYDROCHLORIDE 240 MG/1
CAPSULE, EXTENDED RELEASE ORAL
Qty: 90 CAPSULE | Refills: 1 | Status: SHIPPED | OUTPATIENT
Start: 2018-07-02 | End: 2019-01-16 | Stop reason: SDUPTHER

## 2018-07-19 ENCOUNTER — OFFICE VISIT (OUTPATIENT)
Dept: GASTROENTEROLOGY | Facility: CLINIC | Age: 68
End: 2018-07-19

## 2018-07-19 VITALS
BODY MASS INDEX: 23.05 KG/M2 | TEMPERATURE: 97.9 F | DIASTOLIC BLOOD PRESSURE: 78 MMHG | SYSTOLIC BLOOD PRESSURE: 126 MMHG | HEIGHT: 64 IN | WEIGHT: 135 LBS

## 2018-07-19 DIAGNOSIS — R19.5 HEME POSITIVE STOOL: ICD-10-CM

## 2018-07-19 DIAGNOSIS — Z80.0 FH: COLON CANCER: ICD-10-CM

## 2018-07-19 DIAGNOSIS — R13.10 DYSPHAGIA, UNSPECIFIED TYPE: Primary | ICD-10-CM

## 2018-07-19 PROCEDURE — 99214 OFFICE O/P EST MOD 30 MIN: CPT | Performed by: INTERNAL MEDICINE

## 2018-07-19 NOTE — PROGRESS NOTES
Chief Complaint   Patient presents with   • Difficulty Swallowing     painful swallowing    • Abdominal Pain       History of Present Illness: 67 yo female who has occasional epigastric pain (pyloric spasms).  She is here because of solids dysphagia. She went to the ER at [facility] and the food passes on its own. She has no chest discomfort and no dysphagia. No odynaphagia. She takes Pepcid once daily now. No heartburn. NO nausea or vomiting. No fevers. Weight stable. No diarrhea. + constipation. NO rectal bleeding or melena. She takes one baby aspirin/day and prn ibuprofen.     Past Medical History:   Diagnosis Date   • Anxiety    • GERD (gastroesophageal reflux disease)    • Hyperlipidemia    • Hypertension    • Irritable bowel syndrome        Past Surgical History:   Procedure Laterality Date   • APPENDECTOMY  1968    Removed along w/gall bladder   • BREAST BIOPSY     • CHOLECYSTECTOMY     • COLONOSCOPY  09/22/2015    HP polyp, [facility].   • COLONOSCOPY  12/30/2011    EH, [facility]   • TUBAL ABDOMINAL LIGATION  1968         Current Outpatient Prescriptions:   •  aspirin 81 MG tablet, Take  by mouth., Disp: , Rfl:   •  Biotin 10 MG tablet, Take  by mouth., Disp: , Rfl:   •  buPROPion XL (WELLBUTRIN XL) 150 MG 24 hr tablet, TAKE 1 TABLET DAILY AS DIRECTED, Disp: 90 tablet, Rfl: 1  •  Cetirizine HCl 10 MG capsule, Take  by mouth., Disp: , Rfl:   •  famotidine (PEPCID) 20 MG tablet, Take 20 mg by mouth 2 (Two) Times a Day., Disp: , Rfl:   •  LORazepam (ATIVAN) 1 MG tablet, Take 1 tablet by mouth Every 8 (Eight) Hours As Needed for Anxiety., Disp: 15 tablet, Rfl: 0  •  metoclopramide (REGLAN) 10 MG tablet, Take 1 tablet by mouth 4 (Four) Times a Day Before Meals & at Bedtime. (Patient taking differently: Take 10 mg by mouth 4 (Four) Times a Day Before Meals & at Bedtime. Patient taking PRN), Disp: 30 tablet, Rfl: 0  •  rosuvastatin (CRESTOR) 10 MG tablet, TAKE 1 TABLET DAILY, Disp: 90 tablet, Rfl: 3  •  spironolactone (ALDACTONE) 50 MG  tablet, TAKE 1 TABLET DAILY, Disp: 90 tablet, Rfl: 1  •  verapamil (VERELAN) 240 MG 24 hr capsule, TAKE 1 CAPSULE DAILY, Disp: 90 capsule, Rfl: 1  •  dicyclomine (BENTYL) 10 MG capsule, 1 q 6 h prn abdominal pain, Disp: 120 capsule, Rfl: 11  •  HAVRIX 1440 EL U/ML vaccine, ADM 1ML IM UTD, Disp: , Rfl: 0    Allergies   Allergen Reactions   • Codeine    • Nitrofurantoin    • Sulfamethoxazole-Trimethoprim        Family History   Problem Relation Age of Onset   • Alzheimer's disease Mother    • Colon cancer Father        Social History     Social History   • Marital status:      Spouse name: N/A   • Number of children: N/A   • Years of education: N/A     Occupational History   • Not on file.     Social History Main Topics   • Smoking status: Former Smoker     Quit date: 1/1/1990   • Smokeless tobacco: Never Used      Comment: Smoking in social settings   • Alcohol use No   • Drug use: No   • Sexual activity: Yes     Partners: Male     Birth control/ protection: Post-menopausal     Other Topics Concern   • Not on file     Social History Narrative   • No narrative on file       Review of Systems   All other systems reviewed and are negative.      Vitals:    07/19/18 0925   BP: 126/78   Temp: 97.9 °F (36.6 °C)       Physical Exam   Constitutional: She is oriented to person, place, and time. She appears well-developed and well-nourished. No distress.   HENT:   Head: Normocephalic and atraumatic. Hair is normal.   Right Ear: Hearing, tympanic membrane, external ear and ear canal normal. No drainage. No decreased hearing is noted.   Left Ear: Hearing, tympanic membrane, external ear and ear canal normal. No decreased hearing is noted.   Nose: No nasal deformity.   Mouth/Throat: Oropharynx is clear and moist.   Eyes: Pupils are equal, round, and reactive to light. Conjunctivae, EOM and lids are normal. Right eye exhibits no discharge. Left eye exhibits no discharge.   Neck: Normal range of motion. Neck supple. No JVD  present. No tracheal deviation present. No thyromegaly present.   Cardiovascular: Normal rate, regular rhythm, normal heart sounds, intact distal pulses and normal pulses.  Exam reveals no gallop and no friction rub.    No murmur heard.  Pulmonary/Chest: Effort normal and breath sounds normal. No respiratory distress. She has no wheezes. She has no rales. She exhibits no tenderness.   Abdominal: Soft. Bowel sounds are normal. She exhibits no distension and no mass. There is no tenderness. There is no rebound and no guarding. No hernia.   Genitourinary: Rectal exam shows guaiac positive stool.   Genitourinary Comments: Stool trace heme positive.    Musculoskeletal: Normal range of motion. She exhibits no edema, tenderness or deformity.   Lymphadenopathy:     She has no cervical adenopathy.   Neurological: She is alert and oriented to person, place, and time. She has normal reflexes. She displays normal reflexes. No cranial nerve deficit. She exhibits normal muscle tone. Coordination normal.   Skin: Skin is warm and dry. No rash noted. She is not diaphoretic. No erythema.   Psychiatric: She has a normal mood and affect. Her behavior is normal. Judgment and thought content normal.   Vitals reviewed.      Karena was seen today for difficulty swallowing and abdominal pain.    Diagnoses and all orders for this visit:    Dysphagia, unspecified type  -     Case Request; Standing    Heme positive stool  -     Case Request; Standing    FH: colon cancer  -     Case Request; Standing    Other orders  -     Follow Anesthesia Guidelines / Standing Orders; Future  -     Implement Anesthesia orders day of procedure.; Standing  -     Obtain informed consent; Standing  -     Verify bowel prep was successful; Standing  -     Give tap water enema if bowel prep was insufficient; Standing      Assessment:  1) FH (dad and PGM) colon cancer  2) dysphagia  3) Trace heme positive    Recommendations:  1) EGD and colonoscopy in the next one  month.  2) On the day of scopes get labs: CBC, CMP    No Follow-up on file.    Jona Villavicencio MD  7/19/2018  Answers for HPI/ROS submitted by the patient on 7/12/2018   Abdominal pain  Chronicity: recurrent  Onset: 1 to 4 weeks ago  Onset quality: sudden  Frequency: intermittently  Episode duration: 1 hours  Pain location: epigastric region  Pain - numeric: 10/10  Pain quality: sharp  Radiates to: does not radiate  Aggravated by: certain positions  Relieved by: nothing

## 2018-09-05 ENCOUNTER — TELEPHONE (OUTPATIENT)
Dept: SPORTS MEDICINE | Facility: CLINIC | Age: 68
End: 2018-09-05

## 2018-09-05 DIAGNOSIS — I10 HYPERTENSION, UNSPECIFIED TYPE: Primary | ICD-10-CM

## 2018-09-05 RX ORDER — SPIRONOLACTONE 50 MG/1
50 TABLET, FILM COATED ORAL DAILY
Qty: 90 TABLET | Refills: 1 | Status: SHIPPED | OUTPATIENT
Start: 2018-09-05 | End: 2019-03-05 | Stop reason: SDUPTHER

## 2018-09-05 NOTE — TELEPHONE ENCOUNTER
Patient called needing to discuss BP meds.    [facility] stated was using [facility] and now going to [facility]; needs new rx sent to [facility].

## 2018-09-11 ENCOUNTER — HOSPITAL ENCOUNTER (OUTPATIENT)
Facility: HOSPITAL | Age: 68
Setting detail: HOSPITAL OUTPATIENT SURGERY
Discharge: HOME | End: 2018-09-11
Attending: INTERNAL MEDICINE | Admitting: INTERNAL MEDICINE

## 2018-09-11 ENCOUNTER — ANESTHESIA (OUTPATIENT)
Dept: GASTROENTEROLOGY | Facility: HOSPITAL | Age: 68
End: 2018-09-11

## 2018-09-11 ENCOUNTER — ANESTHESIA EVENT (OUTPATIENT)
Dept: GASTROENTEROLOGY | Facility: HOSPITAL | Age: 68
End: 2018-09-11

## 2018-09-11 VITALS
DIASTOLIC BLOOD PRESSURE: 72 MMHG | TEMPERATURE: 97.7 F | HEIGHT: 64 IN | BODY MASS INDEX: 22.36 KG/M2 | SYSTOLIC BLOOD PRESSURE: 114 MMHG | OXYGEN SATURATION: 96 % | HEART RATE: 76 BPM | RESPIRATION RATE: 16 BRPM | WEIGHT: 131 LBS

## 2018-09-11 DIAGNOSIS — Z80.0 FH: COLON CANCER: ICD-10-CM

## 2018-09-11 DIAGNOSIS — R19.5 HEME POSITIVE STOOL: ICD-10-CM

## 2018-09-11 DIAGNOSIS — R13.10 DYSPHAGIA, UNSPECIFIED TYPE: ICD-10-CM

## 2018-09-11 LAB
ALBUMIN SERPL-MCNC: 4.3 G/DL (ref 3.5–5.2)
ALBUMIN/GLOB SERPL: 1.4 G/DL
ALP SERPL-CCNC: 109 U/L (ref 39–117)
ALT SERPL W P-5'-P-CCNC: 23 U/L (ref 1–33)
ANION GAP SERPL CALCULATED.3IONS-SCNC: 13.3 MMOL/L
AST SERPL-CCNC: 21 U/L (ref 1–32)
BASOPHILS # BLD AUTO: 0.02 10*3/MM3 (ref 0–0.2)
BASOPHILS NFR BLD AUTO: 0.2 % (ref 0–1.5)
BILIRUB SERPL-MCNC: 0.4 MG/DL (ref 0.1–1.2)
BUN BLD-MCNC: 8 MG/DL (ref 8–23)
BUN/CREAT SERPL: 10.3 (ref 7–25)
CALCIUM SPEC-SCNC: 9.2 MG/DL (ref 8.6–10.5)
CHLORIDE SERPL-SCNC: 98 MMOL/L (ref 98–107)
CO2 SERPL-SCNC: 26.7 MMOL/L (ref 22–29)
CREAT BLD-MCNC: 0.78 MG/DL (ref 0.57–1)
DEPRECATED RDW RBC AUTO: 41.3 FL (ref 37–54)
EOSINOPHIL # BLD AUTO: 0.01 10*3/MM3 (ref 0–0.7)
EOSINOPHIL NFR BLD AUTO: 0.1 % (ref 0.3–6.2)
ERYTHROCYTE [DISTWIDTH] IN BLOOD BY AUTOMATED COUNT: 13 % (ref 11.7–13)
GFR SERPL CREATININE-BSD FRML MDRD: 73 ML/MIN/1.73
GLOBULIN UR ELPH-MCNC: 3 GM/DL
GLUCOSE BLD-MCNC: 96 MG/DL (ref 65–99)
HCT VFR BLD AUTO: 46.2 % (ref 35.6–45.5)
HGB BLD-MCNC: 14.7 G/DL (ref 11.9–15.5)
IMM GRANULOCYTES # BLD: 0.02 10*3/MM3 (ref 0–0.03)
IMM GRANULOCYTES NFR BLD: 0.2 % (ref 0–0.5)
LYMPHOCYTES # BLD AUTO: 0.95 10*3/MM3 (ref 0.9–4.8)
LYMPHOCYTES NFR BLD AUTO: 9 % (ref 19.6–45.3)
MCH RBC QN AUTO: 27.7 PG (ref 26.9–32)
MCHC RBC AUTO-ENTMCNC: 31.8 G/DL (ref 32.4–36.3)
MCV RBC AUTO: 87.2 FL (ref 80.5–98.2)
MONOCYTES # BLD AUTO: 0.5 10*3/MM3 (ref 0.2–1.2)
MONOCYTES NFR BLD AUTO: 4.8 % (ref 5–12)
NEUTROPHILS # BLD AUTO: 9.02 10*3/MM3 (ref 1.9–8.1)
NEUTROPHILS NFR BLD AUTO: 85.7 % (ref 42.7–76)
PLATELET # BLD AUTO: 251 10*3/MM3 (ref 140–500)
PMV BLD AUTO: 9.2 FL (ref 6–12)
POTASSIUM BLD-SCNC: 4.1 MMOL/L (ref 3.5–5.2)
PROT SERPL-MCNC: 7.3 G/DL (ref 6–8.5)
RBC # BLD AUTO: 5.3 10*6/MM3 (ref 3.9–5.2)
SODIUM BLD-SCNC: 138 MMOL/L (ref 136–145)
WBC NRBC COR # BLD: 10.52 10*3/MM3 (ref 4.5–10.7)

## 2018-09-11 PROCEDURE — 25010000002 PROPOFOL 10 MG/ML EMULSION: Performed by: ANESTHESIOLOGY

## 2018-09-11 PROCEDURE — 85025 COMPLETE CBC W/AUTO DIFF WBC: CPT | Performed by: INTERNAL MEDICINE

## 2018-09-11 PROCEDURE — 87081 CULTURE SCREEN ONLY: CPT | Performed by: INTERNAL MEDICINE

## 2018-09-11 PROCEDURE — 45380 COLONOSCOPY AND BIOPSY: CPT | Performed by: INTERNAL MEDICINE

## 2018-09-11 PROCEDURE — 43450 DILATE ESOPHAGUS 1/MULT PASS: CPT | Performed by: INTERNAL MEDICINE

## 2018-09-11 PROCEDURE — 43239 EGD BIOPSY SINGLE/MULTIPLE: CPT | Performed by: INTERNAL MEDICINE

## 2018-09-11 PROCEDURE — 88312 SPECIAL STAINS GROUP 1: CPT | Performed by: INTERNAL MEDICINE

## 2018-09-11 PROCEDURE — 88305 TISSUE EXAM BY PATHOLOGIST: CPT | Performed by: INTERNAL MEDICINE

## 2018-09-11 PROCEDURE — 80053 COMPREHEN METABOLIC PANEL: CPT | Performed by: INTERNAL MEDICINE

## 2018-09-11 RX ORDER — SODIUM CHLORIDE, SODIUM LACTATE, POTASSIUM CHLORIDE, CALCIUM CHLORIDE 600; 310; 30; 20 MG/100ML; MG/100ML; MG/100ML; MG/100ML
1000 INJECTION, SOLUTION INTRAVENOUS CONTINUOUS
Status: DISCONTINUED | OUTPATIENT
Start: 2018-09-11 | End: 2018-09-11 | Stop reason: HOSPADM

## 2018-09-11 RX ORDER — LIDOCAINE HYDROCHLORIDE 10 MG/ML
0.5 INJECTION, SOLUTION INFILTRATION; PERINEURAL ONCE AS NEEDED
Status: DISCONTINUED | OUTPATIENT
Start: 2018-09-11 | End: 2018-09-11 | Stop reason: HOSPADM

## 2018-09-11 RX ORDER — PROPOFOL 10 MG/ML
VIAL (ML) INTRAVENOUS CONTINUOUS PRN
Status: DISCONTINUED | OUTPATIENT
Start: 2018-09-11 | End: 2018-09-11 | Stop reason: SURG

## 2018-09-11 RX ORDER — CIPROFLOXACIN 500 MG/1
500 TABLET, FILM COATED ORAL 2 TIMES DAILY
COMMUNITY
End: 2018-09-12 | Stop reason: SDUPTHER

## 2018-09-11 RX ORDER — SODIUM CHLORIDE 0.9 % (FLUSH) 0.9 %
3 SYRINGE (ML) INJECTION AS NEEDED
Status: DISCONTINUED | OUTPATIENT
Start: 2018-09-11 | End: 2018-09-11 | Stop reason: HOSPADM

## 2018-09-11 RX ORDER — LIDOCAINE HYDROCHLORIDE 20 MG/ML
INJECTION, SOLUTION INFILTRATION; PERINEURAL AS NEEDED
Status: DISCONTINUED | OUTPATIENT
Start: 2018-09-11 | End: 2018-09-11 | Stop reason: SURG

## 2018-09-11 RX ORDER — PROPOFOL 10 MG/ML
VIAL (ML) INTRAVENOUS AS NEEDED
Status: DISCONTINUED | OUTPATIENT
Start: 2018-09-11 | End: 2018-09-11 | Stop reason: SURG

## 2018-09-11 RX ADMIN — PROPOFOL 96 MG: 10 INJECTION, EMULSION INTRAVENOUS at 10:25

## 2018-09-11 RX ADMIN — SODIUM CHLORIDE, POTASSIUM CHLORIDE, SODIUM LACTATE AND CALCIUM CHLORIDE 1000 ML: 600; 310; 30; 20 INJECTION, SOLUTION INTRAVENOUS at 09:56

## 2018-09-11 RX ADMIN — PROPOFOL 100 MCG/KG/MIN: 10 INJECTION, EMULSION INTRAVENOUS at 10:45

## 2018-09-11 RX ADMIN — PROPOFOL 200 MG: 10 INJECTION, EMULSION INTRAVENOUS at 10:45

## 2018-09-11 RX ADMIN — LIDOCAINE HYDROCHLORIDE 75 MG: 20 INJECTION, SOLUTION INFILTRATION; PERINEURAL at 10:25

## 2018-09-11 RX ADMIN — PROPOFOL 104 MG: 10 INJECTION, EMULSION INTRAVENOUS at 10:30

## 2018-09-11 NOTE — H&P
Chief Complaint   Patient presents with   • Difficulty Swallowing       painful swallowing    • Abdominal Pain         History of Present Illness: 69 yo female who has occasional epigastric pain (pyloric spasms).  She is here because of solids dysphagia. She went to the ER at [facility] and the food passes on its own. She has no chest discomfort and no dysphagia. No odynaphagia. She takes Pepcid once daily now. No heartburn. NO nausea or vomiting. No fevers. Weight stable. No diarrhea. + constipation. NO rectal bleeding or melena. She takes one baby aspirin/day and prn ibuprofen.      Medical History        Past Medical History:   Diagnosis Date   • Anxiety     • GERD (gastroesophageal reflux disease)     • Hyperlipidemia     • Hypertension     • Irritable bowel syndrome              Surgical History         Past Surgical History:   Procedure Laterality Date   • APPENDECTOMY   1968     Removed along w/gall bladder   • BREAST BIOPSY       • CHOLECYSTECTOMY       • COLONOSCOPY   09/22/2015     HP polyp, IH.   • COLONOSCOPY   12/30/2011     EH, IH   • TUBAL ABDOMINAL LIGATION   1968               Current Outpatient Prescriptions:   •  aspirin 81 MG tablet, Take  by mouth., Disp: , Rfl:   •  Biotin 10 MG tablet, Take  by mouth., Disp: , Rfl:   •  buPROPion XL (WELLBUTRIN XL) 150 MG 24 hr tablet, TAKE 1 TABLET DAILY AS DIRECTED, Disp: 90 tablet, Rfl: 1  •  Cetirizine HCl 10 MG capsule, Take  by mouth., Disp: , Rfl:   •  famotidine (PEPCID) 20 MG tablet, Take 20 mg by mouth 2 (Two) Times a Day., Disp: , Rfl:   •  LORazepam (ATIVAN) 1 MG tablet, Take 1 tablet by mouth Every 8 (Eight) Hours As Needed for Anxiety., Disp: 15 tablet, Rfl: 0  •  metoclopramide (REGLAN) 10 MG tablet, Take 1 tablet by mouth 4 (Four) Times a Day Before Meals & at Bedtime. (Patient taking differently: Take 10 mg by mouth 4 (Four) Times a Day Before Meals & at Bedtime. Patient taking PRN), Disp: 30 tablet, Rfl: 0  •  rosuvastatin (CRESTOR) 10 MG tablet,  TAKE 1 TABLET DAILY, Disp: 90 tablet, Rfl: 3  •  spironolactone (ALDACTONE) 50 MG tablet, TAKE 1 TABLET DAILY, Disp: 90 tablet, Rfl: 1  •  verapamil (VERELAN) 240 MG 24 hr capsule, TAKE 1 CAPSULE DAILY, Disp: 90 capsule, Rfl: 1  •  dicyclomine (BENTYL) 10 MG capsule, 1 q 6 h prn abdominal pain, Disp: 120 capsule, Rfl: 11  •  HAVRIX 1440 EL U/ML vaccine, ADM 1ML IM UTD, Disp: , Rfl: 0          Allergies   Allergen Reactions   • Codeine     • Nitrofurantoin     • Sulfamethoxazole-Trimethoprim           Family History   Problem Relation Age of Onset   • Alzheimer's disease Mother     • Colon cancer Father           Social History   Social History            Social History   • Marital status:        Spouse name: N/A   • Number of children: N/A   • Years of education: N/A          Occupational History   • Not on file.             Social History Main Topics   • Smoking status: Former Smoker       Quit date: 1/1/1990   • Smokeless tobacco: Never Used         Comment: Smoking in social settings   • Alcohol use No   • Drug use: No   • Sexual activity: Yes       Partners: Male       Birth control/ protection: Post-menopausal           Other Topics Concern   • Not on file          Social History Narrative   • No narrative on file            Review of Systems   All other systems reviewed and are negative.            Vitals:     07/19/18 0925   BP: 126/78   Temp: 97.9 °F (36.6 °C)         Physical Exam   Constitutional: She is oriented to person, place, and time. She appears well-developed and well-nourished. No distress.   HENT:   Head: Normocephalic and atraumatic. Hair is normal.   Right Ear: Hearing, tympanic membrane, external ear and ear canal normal. No drainage. No decreased hearing is noted.   Left Ear: Hearing, tympanic membrane, external ear and ear canal normal. No decreased hearing is noted.   Nose: No nasal deformity.   Mouth/Throat: Oropharynx is clear and moist.   Eyes: Pupils are equal, round, and  reactive to light. Conjunctivae, EOM and lids are normal. Right eye exhibits no discharge. Left eye exhibits no discharge.   Neck: Normal range of motion. Neck supple. No JVD present. No tracheal deviation present. No thyromegaly present.   Cardiovascular: Normal rate, regular rhythm, normal heart sounds, intact distal pulses and normal pulses.  Exam reveals no gallop and no friction rub.    No murmur heard.  Pulmonary/Chest: Effort normal and breath sounds normal. No respiratory distress. She has no wheezes. She has no rales. She exhibits no tenderness.   Abdominal: Soft. Bowel sounds are normal. She exhibits no distension and no mass. There is no tenderness. There is no rebound and no guarding. No hernia.   Genitourinary: Rectal exam shows guaiac positive stool.   Genitourinary Comments: Stool trace heme positive.    Musculoskeletal: Normal range of motion. She exhibits no edema, tenderness or deformity.   Lymphadenopathy:     She has no cervical adenopathy.   Neurological: She is alert and oriented to person, place, and time. She has normal reflexes. She displays normal reflexes. No cranial nerve deficit. She exhibits normal muscle tone. Coordination normal.   Skin: Skin is warm and dry. No rash noted. She is not diaphoretic. No erythema.   Psychiatric: She has a normal mood and affect. Her behavior is normal. Judgment and thought content normal.   Vitals reviewed.        Karena was seen today for difficulty swallowing and abdominal pain.     Diagnoses and all orders for this visit:     Dysphagia, unspecified type  -     Case Request; Standing     Heme positive stool  -     Case Request; Standing     FH: colon cancer  -     Case Request; Standing     Other orders  -     Follow Anesthesia Guidelines / Standing Orders; Future  -     Implement Anesthesia orders day of procedure.; Standing  -     Obtain informed consent; Standing  -     Verify bowel prep was successful; Standing  -     Give tap water enema if bowel  prep was insufficient; Standing        Assessment:  1) FH (dad and PGM) colon cancer  2) dysphagia  3) Trace heme positive     Recommendations:  1) EGD and colonoscopy in the next one month.  2) On the day of scopes get labs: CBC, CMP     No Follow-up on file.     9/11/18 - No change from the above H and P.   Jona Villavicencio MD

## 2018-09-11 NOTE — ANESTHESIA PREPROCEDURE EVALUATION
Anesthesia Evaluation     Patient summary reviewed and Nursing notes reviewed   NPO Solid Status: > 8 hours  NPO Liquid Status: > 8 hours           Airway   Mallampati: III  TM distance: <3 FB  Neck ROM: full  Possible difficult intubation and Small opening  Dental - normal exam     Pulmonary - normal exam   (+) a smoker Former,   Cardiovascular - normal exam    (+) hypertension 2 medications or greater,       Neuro/Psych  (+) psychiatric history Anxiety and Depression,     GI/Hepatic/Renal/Endo    (+)  GERD,      ROS Comment: Irritable bowel syndrome    Musculoskeletal     Abdominal    Substance History      OB/GYN          Other                      Anesthesia Plan    ASA 3     Anesthetic plan, all risks, benefits, and alternatives have been provided, discussed and informed consent has been obtained with: patient and spouse/significant other.

## 2018-09-11 NOTE — ANESTHESIA POSTPROCEDURE EVALUATION
"Patient: Karena Perez    Procedure Summary     Date:  09/11/18 Room / Location:   [facility]    Anesthesia Start:  1020 Anesthesia Stop:  1105    Procedures:       ESOPHAGOGASTRODUODENOSCOPY WITH BIOPSIES PARSONS DILATION (N/A Esophagus)      COLONOSCOPY WITH POLYPECTOMY (COLD BX) (N/A ) Diagnosis:       Heme positive stool      FH: colon cancer      Dysphagia, unspecified type      (Heme positive stool [R19.5])      (FH: colon cancer [Z80.0])      (Dysphagia, unspecified type [R13.10])    Surgeon:  Jona Villavicencio MD Provider:  Talia Rockwell MD    Anesthesia Type:  Not recorded ASA Status:  3          Anesthesia Type: No value filed.  Last vitals  BP   114/72 (09/11/18 1126)   Temp   36.5 °C (97.7 °F) (09/11/18 1113)   Pulse   76 (09/11/18 1126)   Resp   16 (09/11/18 1126)     SpO2   96 % (09/11/18 1126)     Post Anesthesia Care and Evaluation    Patient location during evaluation: bedside  Patient participation: complete - patient participated  Level of consciousness: awake  Pain management: adequate  Airway patency: patent  Anesthetic complications: No anesthetic complications    Cardiovascular status: acceptable  Respiratory status: acceptable  Hydration status: acceptable    Comments: /72 (BP Location: Left arm, Patient Position: Lying)   Pulse 76   Temp 36.5 °C (97.7 °F) (Oral)   Resp 16   Ht 161.3 cm (63.5\")   Wt 59.4 kg (131 lb)   SpO2 96%   BMI 22.84 kg/m²         "

## 2018-09-11 NOTE — DISCHARGE INSTRUCTIONS
For the next 24 hours patient needs to be with a responsible adult.    For 24 hours DO NOT drive, operate machinery, appliances, drink alcohol, make important decisions or sign legal documents.    Start with a light or bland diet and advance to regular diet as tolerated.    Follow recommendations on procedure report provided by your doctor.    Call Dr Villavicencio for problems [phone] and for biopsy results in 7-10 days.    Problems may include but not limited to: large amounts of bleeding, trouble breathing, repeated vomiting, severe unrelieved pain, fever or chills.

## 2018-09-12 LAB
CYTO UR: NORMAL
LAB AP CASE REPORT: NORMAL
PATH REPORT.FINAL DX SPEC: NORMAL
PATH REPORT.GROSS SPEC: NORMAL
UREASE TISS QL: NEGATIVE

## 2018-09-12 RX ORDER — CIPROFLOXACIN 500 MG/1
500 TABLET, FILM COATED ORAL EVERY 12 HOURS SCHEDULED
Qty: 20 TABLET | Refills: 0 | Status: SHIPPED | OUTPATIENT
Start: 2018-09-12 | End: 2019-01-25

## 2018-09-19 ENCOUNTER — TELEPHONE (OUTPATIENT)
Dept: GASTROENTEROLOGY | Facility: CLINIC | Age: 68
End: 2018-09-19

## 2018-09-19 NOTE — TELEPHONE ENCOUNTER
----- Message from Phoenix Hendricks sent at 9/19/2018  9:26 AM EDT -----  Regarding: pt is calling for her scope results   Contact: [phone]  ...

## 2018-09-19 NOTE — TELEPHONE ENCOUNTER
Called pt and advised per Dr Villavicencio that her lab work looked good.  The path from the egd showed minimal chronic inflammation in the stomach but no evidence of h pylori.  Bx of the distal esophagus suggested possible baker's esophagus, but he is not sure she has baker's.  Baker's esophagus is a complication of gerd.  The colon polyps that were removed were not cancerous but one was precancerous. He recommends a repeat c/s in 3 yrs and maybe at that time we would repeat the egd looking again to see if there is evidence of baker's esophagus.  If she has more questions she can f/u with him or Angela NP.      Pt verb understanding.     Egd and c/s placed in recall for 09/11/2021.

## 2018-09-19 NOTE — TELEPHONE ENCOUNTER
----- Message from Jona Villavicencio MD sent at 9/19/2018 12:44 PM EDT -----  Tell her that her lab work looked good.  The pathology from the EGD showed minimal chronic inflammation the stomach but no evidence of Helicobacter pylori.  Biopsies of the distal esophagus suggested possible Ocker's esophagus but I am not convinced that she has Coker's esophagus a stone away her esophagus.  Coker's esophagus is a complication of chronic acid reflux.  The colon polyps that were removed were not cancerous but 1 was precancerous.  I would recommend a repeat colonoscopy in 3 years.  Maybe at that time we would repeat the EGD looking again to see if there is evidence of Coker's esophagus.  If she has more questions about any of that she can certainly follow up with me (or Ms. Miriam NP) in the office and we could discuss.  Roberta lal

## 2018-10-02 DIAGNOSIS — R89.9 ABNORMAL LABORATORY TEST: Primary | ICD-10-CM

## 2018-10-03 LAB
BASOPHILS # BLD AUTO: 0.03 10*3/MM3 (ref 0–0.2)
BASOPHILS NFR BLD AUTO: 0.4 % (ref 0–1.5)
EOSINOPHIL # BLD AUTO: 0.18 10*3/MM3 (ref 0–0.7)
EOSINOPHIL NFR BLD AUTO: 2.4 % (ref 0.3–6.2)
ERYTHROCYTE [DISTWIDTH] IN BLOOD BY AUTOMATED COUNT: 13.3 % (ref 11.7–13)
HCT VFR BLD AUTO: 45.7 % (ref 35.6–45.5)
HGB BLD-MCNC: 14.7 G/DL (ref 11.9–15.5)
IMM GRANULOCYTES # BLD: 0.02 10*3/MM3 (ref 0–0.03)
IMM GRANULOCYTES NFR BLD: 0.3 % (ref 0–0.5)
LYMPHOCYTES # BLD AUTO: 2.36 10*3/MM3 (ref 0.9–4.8)
LYMPHOCYTES NFR BLD AUTO: 32 % (ref 19.6–45.3)
MCH RBC QN AUTO: 28.4 PG (ref 26.9–32)
MCHC RBC AUTO-ENTMCNC: 32.2 G/DL (ref 32.4–36.3)
MCV RBC AUTO: 88.4 FL (ref 80.5–98.2)
MONOCYTES # BLD AUTO: 0.72 10*3/MM3 (ref 0.2–1.2)
MONOCYTES NFR BLD AUTO: 9.8 % (ref 5–12)
NEUTROPHILS # BLD AUTO: 4.08 10*3/MM3 (ref 1.9–8.1)
NEUTROPHILS NFR BLD AUTO: 55.4 % (ref 42.7–76)
PLATELET # BLD AUTO: 299 10*3/MM3 (ref 140–500)
RBC # BLD AUTO: 5.17 10*6/MM3 (ref 3.9–5.2)
WBC # BLD AUTO: 7.37 10*3/MM3 (ref 4.5–10.7)

## 2018-10-04 ENCOUNTER — TELEPHONE (OUTPATIENT)
Dept: SPORTS MEDICINE | Facility: CLINIC | Age: 68
End: 2018-10-04

## 2018-10-24 RX ORDER — BUPROPION HYDROCHLORIDE 150 MG/1
TABLET ORAL
Qty: 90 TABLET | Refills: 1 | Status: SHIPPED | OUTPATIENT
Start: 2018-10-24 | End: 2019-03-17 | Stop reason: SDUPTHER

## 2019-01-10 DIAGNOSIS — Z13.29 SCREENING FOR THYROID DISORDER: ICD-10-CM

## 2019-01-10 DIAGNOSIS — E78.5 HYPERLIPIDEMIA, UNSPECIFIED HYPERLIPIDEMIA TYPE: ICD-10-CM

## 2019-01-10 DIAGNOSIS — Z00.00 HEALTH CARE MAINTENANCE: Primary | ICD-10-CM

## 2019-01-10 DIAGNOSIS — Z12.5 SCREENING PSA (PROSTATE SPECIFIC ANTIGEN): ICD-10-CM

## 2019-01-15 ENCOUNTER — RESULTS ENCOUNTER (OUTPATIENT)
Dept: SPORTS MEDICINE | Facility: CLINIC | Age: 69
End: 2019-01-15

## 2019-01-15 DIAGNOSIS — Z12.5 SCREENING PSA (PROSTATE SPECIFIC ANTIGEN): ICD-10-CM

## 2019-01-15 DIAGNOSIS — Z13.29 SCREENING FOR THYROID DISORDER: ICD-10-CM

## 2019-01-15 DIAGNOSIS — E78.5 HYPERLIPIDEMIA, UNSPECIFIED HYPERLIPIDEMIA TYPE: ICD-10-CM

## 2019-01-15 DIAGNOSIS — Z00.00 HEALTH CARE MAINTENANCE: ICD-10-CM

## 2019-01-16 DIAGNOSIS — I10 ESSENTIAL HYPERTENSION: Primary | ICD-10-CM

## 2019-01-16 LAB — PSA SERPL-MCNC: <0.014 NG/ML (ref 0–4)

## 2019-01-16 RX ORDER — VERAPAMIL HYDROCHLORIDE 240 MG/1
240 CAPSULE, EXTENDED RELEASE ORAL DAILY
Qty: 90 CAPSULE | Refills: 3 | Status: SHIPPED | OUTPATIENT
Start: 2019-01-16 | End: 2020-01-10 | Stop reason: SDUPTHER

## 2019-01-17 ENCOUNTER — TELEPHONE (OUTPATIENT)
Dept: SPORTS MEDICINE | Facility: CLINIC | Age: 69
End: 2019-01-17

## 2019-01-17 NOTE — TELEPHONE ENCOUNTER
Started a safety report regarding the PSA lab test ordered by mistake, waiting for reply to delete and not charge Pt.

## 2019-01-18 ENCOUNTER — TELEPHONE (OUTPATIENT)
Dept: SPORTS MEDICINE | Facility: CLINIC | Age: 69
End: 2019-01-18

## 2019-01-18 LAB
ALBUMIN SERPL-MCNC: 4.3 G/DL (ref 3.5–5.2)
ALBUMIN/GLOB SERPL: 1.5 G/DL
ALP SERPL-CCNC: 95 U/L (ref 39–117)
ALT SERPL-CCNC: 18 U/L (ref 1–33)
APPEARANCE UR: CLEAR
AST SERPL-CCNC: 21 U/L (ref 1–32)
BACTERIA #/AREA URNS HPF: ABNORMAL /HPF
BASOPHILS # BLD AUTO: 0.04 10*3/MM3 (ref 0–0.2)
BASOPHILS NFR BLD AUTO: 0.6 % (ref 0–1.5)
BILIRUB SERPL-MCNC: 0.3 MG/DL (ref 0.1–1.2)
BILIRUB UR QL STRIP: NEGATIVE
BUN SERPL-MCNC: 8 MG/DL (ref 8–23)
BUN/CREAT SERPL: 10 (ref 7–25)
CALCIUM SERPL-MCNC: 9.5 MG/DL (ref 8.6–10.5)
CASTS URNS MICRO: ABNORMAL
CASTS URNS QL MICRO: PRESENT /LPF
CHLORIDE SERPL-SCNC: 100 MMOL/L (ref 98–107)
CHOLEST SERPL-MCNC: 131 MG/DL (ref 0–200)
CHOLEST/HDLC SERPL: 2.73 {RATIO}
CO2 SERPL-SCNC: 27.1 MMOL/L (ref 22–29)
COLOR UR: YELLOW
CREAT SERPL-MCNC: 0.8 MG/DL (ref 0.57–1)
CRYSTALS URNS MICRO: ABNORMAL
EOSINOPHIL # BLD AUTO: 0.4 10*3/MM3 (ref 0–0.7)
EOSINOPHIL NFR BLD AUTO: 5.8 % (ref 0.3–6.2)
EPI CELLS #/AREA URNS HPF: ABNORMAL /HPF
ERYTHROCYTE [DISTWIDTH] IN BLOOD BY AUTOMATED COUNT: 13.7 % (ref 11.7–13)
GLOBULIN SER CALC-MCNC: 2.8 GM/DL
GLUCOSE SERPL-MCNC: 86 MG/DL (ref 65–99)
GLUCOSE UR QL: NEGATIVE
HCT VFR BLD AUTO: 45.1 % (ref 35.6–45.5)
HDLC SERPL-MCNC: 48 MG/DL (ref 40–60)
HGB BLD-MCNC: 14.5 G/DL (ref 11.9–15.5)
HGB UR QL STRIP: NEGATIVE
IMM GRANULOCYTES # BLD AUTO: 0.02 10*3/MM3 (ref 0–0.03)
IMM GRANULOCYTES NFR BLD AUTO: 0.3 % (ref 0–0.5)
KETONES UR QL STRIP: NEGATIVE
LDLC SERPL CALC-MCNC: 70 MG/DL (ref 0–100)
LEUKOCYTE ESTERASE UR QL STRIP: NEGATIVE
LYMPHOCYTES # BLD AUTO: 0.87 10*3/MM3 (ref 0.9–4.8)
LYMPHOCYTES NFR BLD AUTO: 12.6 % (ref 19.6–45.3)
MCH RBC QN AUTO: 28.5 PG (ref 26.9–32)
MCHC RBC AUTO-ENTMCNC: 32.2 G/DL (ref 32.4–36.3)
MCV RBC AUTO: 88.8 FL (ref 80.5–98.2)
MICRO URNS: NORMAL
MICRO URNS: NORMAL
MONOCYTES # BLD AUTO: 0.69 10*3/MM3 (ref 0.2–1.2)
MONOCYTES NFR BLD AUTO: 10 % (ref 5–12)
MUCOUS THREADS URNS QL MICRO: PRESENT /HPF
NEUTROPHILS # BLD AUTO: 4.92 10*3/MM3 (ref 1.9–8.1)
NEUTROPHILS NFR BLD AUTO: 71 % (ref 42.7–76)
NITRITE UR QL STRIP: NEGATIVE
PH UR STRIP: 5.5 [PH] (ref 5–7.5)
PLATELET # BLD AUTO: 278 10*3/MM3 (ref 140–500)
POTASSIUM SERPL-SCNC: 4.5 MMOL/L (ref 3.5–5.2)
PROT SERPL-MCNC: 7.1 G/DL (ref 6–8.5)
PROT UR QL STRIP: NEGATIVE
RBC # BLD AUTO: 5.08 10*6/MM3 (ref 3.9–5.2)
RBC #/AREA URNS HPF: ABNORMAL /HPF
SODIUM SERPL-SCNC: 141 MMOL/L (ref 136–145)
SP GR UR: 1.01 (ref 1–1.03)
T4 FREE SERPL-MCNC: 1.18 NG/DL (ref 0.93–1.7)
TRIGL SERPL-MCNC: 67 MG/DL (ref 0–150)
TSH SERPL DL<=0.005 MIU/L-ACNC: 3.37 MIU/ML (ref 0.27–4.2)
UNIDENT CRYS URNS QL MICRO: PRESENT /LPF
URINALYSIS REFLEX: NORMAL
UROBILINOGEN UR STRIP-MCNC: 0.2 MG/DL (ref 0.2–1)
VLDLC SERPL CALC-MCNC: 13.4 MG/DL (ref 5–40)
WBC # BLD AUTO: 6.92 10*3/MM3 (ref 4.5–10.7)
WBC #/AREA URNS HPF: ABNORMAL /HPF

## 2019-01-18 NOTE — TELEPHONE ENCOUNTER
Contacted [facility] billing office to have dx code removed. Notified  that PSA needed to be removed entirely.  informed me that they could only remove the dx code on their end and that I would need to contact [facility] lab to have test removed. Was given ph number [phone]; not working. Called [facility] at [phone]; spoke with [facility] lab and results removed and a new invoice will be faxed to verify patient will not be charged. I am unable to remove this lab out of patient's chart because it had already been resulted. Updated results faxed.

## 2019-01-25 ENCOUNTER — OFFICE VISIT (OUTPATIENT)
Dept: SPORTS MEDICINE | Facility: CLINIC | Age: 69
End: 2019-01-25

## 2019-01-25 VITALS
OXYGEN SATURATION: 95 % | WEIGHT: 135 LBS | TEMPERATURE: 97.8 F | BODY MASS INDEX: 23.05 KG/M2 | SYSTOLIC BLOOD PRESSURE: 126 MMHG | HEIGHT: 64 IN | HEART RATE: 65 BPM | DIASTOLIC BLOOD PRESSURE: 60 MMHG

## 2019-01-25 DIAGNOSIS — Z00.00 MEDICARE ANNUAL WELLNESS VISIT, SUBSEQUENT: Primary | ICD-10-CM

## 2019-01-25 DIAGNOSIS — Z23 IMMUNIZATION DUE: ICD-10-CM

## 2019-01-25 PROCEDURE — G0439 PPPS, SUBSEQ VISIT: HCPCS | Performed by: FAMILY MEDICINE

## 2019-01-25 RX ORDER — ESTRADIOL 0.1 MG/G
CREAM VAGINAL
COMMUNITY
Start: 2018-08-03 | End: 2022-06-30

## 2019-01-25 RX ORDER — PHENAZOPYRIDINE HYDROCHLORIDE 200 MG/1
200 TABLET, FILM COATED ORAL
COMMUNITY
Start: 2019-01-12 | End: 2019-05-10

## 2019-01-25 NOTE — PROGRESS NOTES
QUICK REFERENCE INFORMATION:  The ABCs of the Annual Wellness Visit    Subsequent Medicare Wellness Visit    HEALTH RISK ASSESSMENT    1950    Recent Hospitalizations:  No hospitalization(s) within the last year..        Current Medical Providers:  Patient Care Team:  Hakan Ortiz MD as PCP - General  Hakan Ortiz MD as PCP - Family Medicine        Smoking Status:  Social History     Tobacco Use   Smoking Status Former Smoker   • Last attempt to quit: 1990   • Years since quittin.0   Smokeless Tobacco Never Used   Tobacco Comment    Smoking in social settings       Alcohol Consumption:  Social History     Substance and Sexual Activity   Alcohol Use No       Depression Screen:   PHQ-2/PHQ-9 Depression Screening 2019   Little interest or pleasure in doing things 0   Feeling down, depressed, or hopeless 0   Total Score 0       Health Habits and Functional and Cognitive Screening:  Functional & Cognitive Status 2019   Do you have difficulty preparing food and eating? No   Do you have difficulty bathing yourself, getting dressed or grooming yourself? No   Do you have difficulty using the toilet? No   Do you have difficulty moving around from place to place? No   Do you have trouble with steps or getting out of a bed or a chair? No   In the past year have you fallen or experienced a near fall? Yes   Current Diet Well Balanced Diet   Dental Exam Up to date   Eye Exam Up to date   Exercise (times per week) 7 times per week   Current Exercise Activities Include (No Data)   Do you need help using the phone?  No   Are you deaf or do you have serious difficulty hearing?  No   Do you need help with transportation? No   Do you need help shopping? No   Do you need help preparing meals?  No   Do you need help with housework?  No   Do you need help with laundry? No   Do you need help taking your medications? No   Do you need help managing money? No   Do you ever drive or ride in a car  without wearing a seat belt? No   Have you felt unusual stress, anger or loneliness in the last month? No   Who do you live with? Spouse   If you need help, do you have trouble finding someone available to you? No   Have you been bothered in the last four weeks by sexual problems? No   Do you have difficulty concentrating, remembering or making decisions? No           Does the patient have evidence of cognitive impairment? No    Aspirin use counseling: Start ASA 81 mg daily       Recent Lab Results:  CMP:  Lab Results   Component Value Date    GLU 86 01/15/2019    BUN 8 01/15/2019    CREATININE 0.80 01/15/2019    EGFRIFNONA 71 01/15/2019    EGFRIFAFRI 86 01/15/2019    BCR 10.0 01/15/2019     01/15/2019    K 4.5 01/15/2019    CO2 27.1 01/15/2019    CALCIUM 9.5 01/15/2019    PROTENTOTREF 7.1 01/15/2019    ALBUMIN 4.30 01/15/2019    LABGLOBREF 2.8 01/15/2019    LABIL2 1.5 01/15/2019    BILITOT 0.3 01/15/2019    ALKPHOS 95 01/15/2019    AST 21 01/15/2019    ALT 18 01/15/2019     Lipid Panel:  Lab Results   Component Value Date    TRIG 67 01/15/2019    HDL 48 01/15/2019    VLDL 13.4 01/15/2019     HbA1c:       Visual Acuity:  No exam data present    Age-appropriate Screening Schedule:  Refer to the list below for future screening recommendations based on patient's age, sex and/or medical conditions. Orders for these recommended tests are listed in the plan section. The patient has been provided with a written plan.    Health Maintenance   Topic Date Due   • ZOSTER VACCINE (2 of 2) 11/16/2015   • MAMMOGRAM  03/21/2016   • PNEUMOCOCCAL VACCINES (65+ LOW/MEDIUM RISK) (2 of 2 - PPSV23) 11/17/2017   • INFLUENZA VACCINE  08/01/2018   • LIPID PANEL  01/15/2020   • COLONOSCOPY  09/11/2021   • TDAP/TD VACCINES (2 - Td) 10/25/2026        Subjective   History of Present Illness    Karena Perez is a 68 y.o. female who presents for an Subsequent Wellness Visit.    The following portions of the patient's history were reviewed  and updated as appropriate: allergies, current medications, past family history, past medical history, past social history, past surgical history and problem list.    Outpatient Medications Prior to Visit   Medication Sig Dispense Refill   • estradiol (ESTRACE) 0.1 MG/GM vaginal cream INSERT 1 MG D UTD     • phenazopyridine (PYRIDIUM) 200 MG tablet Take 200 mg by mouth.     • aspirin 81 MG tablet Take  by mouth.     • Biotin 10 MG tablet Take  by mouth.     • buPROPion XL (WELLBUTRIN XL) 150 MG 24 hr tablet TAKE 1 TABLET DAILY AS DIRECTED 90 tablet 1   • Cetirizine HCl 10 MG capsule Take  by mouth.     • dicyclomine (BENTYL) 10 MG capsule 1 q 6 h prn abdominal pain 120 capsule 11   • famotidine (PEPCID) 20 MG tablet Take 20 mg by mouth 2 (Two) Times a Day.     • LORazepam (ATIVAN) 1 MG tablet Take 1 tablet by mouth Every 8 (Eight) Hours As Needed for Anxiety. 15 tablet 0   • metoclopramide (REGLAN) 10 MG tablet Take 1 tablet by mouth 4 (Four) Times a Day Before Meals & at Bedtime. (Patient taking differently: Take 10 mg by mouth 4 (Four) Times a Day Before Meals & at Bedtime. Patient taking PRN) 30 tablet 0   • rosuvastatin (CRESTOR) 10 MG tablet TAKE 1 TABLET DAILY 90 tablet 3   • spironolactone (ALDACTONE) 50 MG tablet Take 1 tablet by mouth Daily. 90 tablet 1   • verapamil (VERELAN) 240 MG 24 hr capsule Take 1 capsule by mouth Daily. 90 capsule 3   • ciprofloxacin (CIPRO) 500 MG tablet Take 1 tablet by mouth Every 12 (Twelve) Hours. 20 tablet 0   • HAVRIX 1440 EL U/ML vaccine ADM 1ML IM UTD  0   • SPIRONOLACTONE PO Take  by mouth.       No facility-administered medications prior to visit.        Patient Active Problem List   Diagnosis   • Complicated migraine   • Depression   • Hyperlipidemia   • Hypertension   • Heme positive stool   • FH: colon cancer   • Dysphagia       Advance Care Planning:  has an advance directive - a copy HAS NOT been provided    Identification of Risk Factors:  Risk factors include:  "cardiovascular risk.    Review of Systems    Compared to one year ago, the patient feels her physical health is the same.  Compared to one year ago, the patient feels her mental health is the same.    Objective     Physical Exam    Vitals:    01/25/19 0849   BP: 126/60   Pulse: 65   Temp: 97.8 °F (36.6 °C)   SpO2: 95%   Weight: 61.2 kg (135 lb)   Height: 161.3 cm (63.5\")       Patient's Body mass index is 23.54 kg/m². BMI is within normal parameters. No follow-up required..      Assessment/Plan   Patient Self-Management and Personalized Health Advice  The patient has been provided with information about: prevention of cardiac or vascular disease and preventive services including:   · Pneumococcal vaccine .    Visit Diagnoses:  No diagnosis found.    No orders of the defined types were placed in this encounter.      Outpatient Encounter Medications as of 1/25/2019   Medication Sig Dispense Refill   • estradiol (ESTRACE) 0.1 MG/GM vaginal cream INSERT 1 MG D UTD     • phenazopyridine (PYRIDIUM) 200 MG tablet Take 200 mg by mouth.     • aspirin 81 MG tablet Take  by mouth.     • Biotin 10 MG tablet Take  by mouth.     • buPROPion XL (WELLBUTRIN XL) 150 MG 24 hr tablet TAKE 1 TABLET DAILY AS DIRECTED 90 tablet 1   • Cetirizine HCl 10 MG capsule Take  by mouth.     • dicyclomine (BENTYL) 10 MG capsule 1 q 6 h prn abdominal pain 120 capsule 11   • famotidine (PEPCID) 20 MG tablet Take 20 mg by mouth 2 (Two) Times a Day.     • LORazepam (ATIVAN) 1 MG tablet Take 1 tablet by mouth Every 8 (Eight) Hours As Needed for Anxiety. 15 tablet 0   • metoclopramide (REGLAN) 10 MG tablet Take 1 tablet by mouth 4 (Four) Times a Day Before Meals & at Bedtime. (Patient taking differently: Take 10 mg by mouth 4 (Four) Times a Day Before Meals & at Bedtime. Patient taking PRN) 30 tablet 0   • rosuvastatin (CRESTOR) 10 MG tablet TAKE 1 TABLET DAILY 90 tablet 3   • spironolactone (ALDACTONE) 50 MG tablet Take 1 tablet by mouth Daily. 90 " tablet 1   • verapamil (VERELAN) 240 MG 24 hr capsule Take 1 capsule by mouth Daily. 90 capsule 3   • [DISCONTINUED] ciprofloxacin (CIPRO) 500 MG tablet Take 1 tablet by mouth Every 12 (Twelve) Hours. 20 tablet 0   • [DISCONTINUED] HAVRIX 1440 EL U/ML vaccine ADM 1ML IM UTD  0   • [DISCONTINUED] SPIRONOLACTONE PO Take  by mouth.       No facility-administered encounter medications on file as of 1/25/2019.        Reviewed use of high risk medication in the elderly: not applicable  Reviewed for potential of harmful drug interactions in the elderly: not applicable    Follow Up:  She is considering the shingles vaccine  She will get Pneumovax    An After Visit Summary and PPPS with all of these plans were given to the patient.

## 2019-03-05 DIAGNOSIS — I10 HYPERTENSION, UNSPECIFIED TYPE: ICD-10-CM

## 2019-03-05 RX ORDER — SPIRONOLACTONE 50 MG/1
50 TABLET, FILM COATED ORAL DAILY
Qty: 90 TABLET | Refills: 0 | Status: SHIPPED | OUTPATIENT
Start: 2019-03-05 | End: 2019-06-14 | Stop reason: SDUPTHER

## 2019-03-18 RX ORDER — BUPROPION HYDROCHLORIDE 150 MG/1
TABLET ORAL
Qty: 90 TABLET | Refills: 1 | Status: SHIPPED | OUTPATIENT
Start: 2019-03-18 | End: 2019-04-04 | Stop reason: SDUPTHER

## 2019-03-19 ENCOUNTER — DOCUMENTATION (OUTPATIENT)
Dept: FAMILY MEDICINE CLINIC | Facility: CLINIC | Age: 69
End: 2019-03-19

## 2019-04-04 ENCOUNTER — TELEPHONE (OUTPATIENT)
Dept: SPORTS MEDICINE | Facility: CLINIC | Age: 69
End: 2019-04-04

## 2019-04-04 DIAGNOSIS — F33.0 MILD EPISODE OF RECURRENT MAJOR DEPRESSIVE DISORDER (HCC): Primary | ICD-10-CM

## 2019-04-04 RX ORDER — BUPROPION HYDROCHLORIDE 300 MG/1
300 TABLET ORAL DAILY
Qty: 90 TABLET | Refills: 3 | Status: SHIPPED | OUTPATIENT
Start: 2019-04-04 | End: 2020-05-18

## 2019-04-04 NOTE — TELEPHONE ENCOUNTER
Patient wants to get back on her wellbutrin 300mg, states that she is going through some family issues and the 150 mg is not helping with her depression.      Would like to get medication sent to [facility]. Please advise, thanks!

## 2019-05-10 ENCOUNTER — OFFICE VISIT (OUTPATIENT)
Dept: SPORTS MEDICINE | Facility: CLINIC | Age: 69
End: 2019-05-10

## 2019-05-10 VITALS
WEIGHT: 135 LBS | SYSTOLIC BLOOD PRESSURE: 128 MMHG | HEIGHT: 64 IN | DIASTOLIC BLOOD PRESSURE: 70 MMHG | BODY MASS INDEX: 23.05 KG/M2 | HEART RATE: 71 BPM | OXYGEN SATURATION: 96 %

## 2019-05-10 DIAGNOSIS — J30.2 SEASONAL ALLERGIES: ICD-10-CM

## 2019-05-10 DIAGNOSIS — H81.10 BENIGN PAROXYSMAL POSITIONAL VERTIGO, UNSPECIFIED LATERALITY: Primary | ICD-10-CM

## 2019-05-10 PROCEDURE — 99213 OFFICE O/P EST LOW 20 MIN: CPT | Performed by: FAMILY MEDICINE

## 2019-05-10 RX ORDER — METHYLPREDNISOLONE ACETATE 40 MG/ML
40 INJECTION, SUSPENSION INTRA-ARTICULAR; INTRALESIONAL; INTRAMUSCULAR; SOFT TISSUE ONCE
Status: COMPLETED | OUTPATIENT
Start: 2019-05-10 | End: 2019-05-10

## 2019-05-10 RX ORDER — OMEPRAZOLE 20 MG/1
20 CAPSULE, DELAYED RELEASE ORAL DAILY
COMMUNITY

## 2019-05-10 RX ORDER — METHYLPREDNISOLONE ACETATE 80 MG/ML
80 INJECTION, SUSPENSION INTRA-ARTICULAR; INTRALESIONAL; INTRAMUSCULAR; SOFT TISSUE ONCE
Status: DISCONTINUED | OUTPATIENT
Start: 2019-05-10 | End: 2019-05-10

## 2019-05-10 RX ADMIN — METHYLPREDNISOLONE ACETATE 40 MG: 40 INJECTION, SUSPENSION INTRA-ARTICULAR; INTRALESIONAL; INTRAMUSCULAR; SOFT TISSUE at 10:32

## 2019-05-10 RX ADMIN — METHYLPREDNISOLONE ACETATE 40 MG: 40 INJECTION, SUSPENSION INTRA-ARTICULAR; INTRALESIONAL; INTRAMUSCULAR; SOFT TISSUE at 10:34

## 2019-05-10 NOTE — PROGRESS NOTES
"Karena is a 69 y.o. year old female evaluation of a problem that is new to this examiner.    Chief Complaint   Patient presents with   • Dizziness     nausea and has had this for 10 days now. states that she thinks she might have fluid in her ears.        History of Present Illness   HPI   Generalized mild headache, some nausea and lightheadedness. Bending over gets lightheaded but no near syncope or syncope. No fever or chills. BM slightly constipated but no melena or BRBPR.  She has noted over the past 2 weeks that she is having more allergy symptoms.  Weight stable.  No palpitations.  I have reviewed the patient's medical, family, and social history in detail and updated the computerized patient record.    Review of Systems   Constitutional: Positive for fatigue.   HENT: Positive for rhinorrhea.    Eyes: Negative.    Respiratory: Negative.    Cardiovascular: Negative.    Neurological: Positive for dizziness.       /70 (BP Location: Left arm, Patient Position: Sitting, Cuff Size: Adult)   Pulse 71   Ht 161.3 cm (63.5\")   Wt 61.2 kg (135 lb)   SpO2 96%   BMI 23.54 kg/m²      Physical Exam   Constitutional: She is oriented to person, place, and time. She appears well-developed and well-nourished.   HENT:   Head: Normocephalic and atraumatic.   Mild serous fluid behind right TM   Eyes: Conjunctivae and EOM are normal. Pupils are equal, round, and reactive to light.   Cardiovascular: Normal rate, regular rhythm and normal heart sounds.   No peripheral edema   Pulmonary/Chest: Effort normal and breath sounds normal.   Neurological: She is alert and oriented to person, place, and time. She displays normal reflexes. She exhibits normal muscle tone. Coordination normal.   Mildly positive Joseph-Hallpike maneuver bilaterally.   Skin: Skin is warm and dry.   Psychiatric: She has a normal mood and affect. Her behavior is normal.   Vitals reviewed.        Current Outpatient Medications:   •  aspirin 81 MG tablet, Take  " by mouth., Disp: , Rfl:   •  Biotin 10 MG tablet, Take  by mouth., Disp: , Rfl:   •  buPROPion XL (WELLBUTRIN XL) 300 MG 24 hr tablet, Take 1 tablet by mouth Daily., Disp: 90 tablet, Rfl: 3  •  Cetirizine HCl 10 MG capsule, Take  by mouth., Disp: , Rfl:   •  D-Mannose 500 MG capsule, Take 500 mg by mouth., Disp: , Rfl:   •  dicyclomine (BENTYL) 10 MG capsule, 1 q 6 h prn abdominal pain, Disp: 120 capsule, Rfl: 11  •  estradiol (ESTRACE) 0.1 MG/GM vaginal cream, INSERT 1 MG D UTD, Disp: , Rfl:   •  LORazepam (ATIVAN) 1 MG tablet, Take 1 tablet by mouth Every 8 (Eight) Hours As Needed for Anxiety., Disp: 15 tablet, Rfl: 0  •  metoclopramide (REGLAN) 10 MG tablet, Take 1 tablet by mouth 4 (Four) Times a Day Before Meals & at Bedtime. (Patient taking differently: Take 10 mg by mouth 4 (Four) Times a Day Before Meals & at Bedtime. Patient taking PRN), Disp: 30 tablet, Rfl: 0  •  omeprazole (priLOSEC) 20 MG capsule, Take 20 mg by mouth Daily., Disp: , Rfl:   •  rosuvastatin (CRESTOR) 10 MG tablet, TAKE 1 TABLET DAILY, Disp: 90 tablet, Rfl: 3  •  spironolactone (ALDACTONE) 50 MG tablet, TAKE 1 TABLET BY MOUTH DAILY, Disp: 90 tablet, Rfl: 0  •  verapamil (VERELAN) 240 MG 24 hr capsule, Take 1 capsule by mouth Daily., Disp: 90 capsule, Rfl: 3  No current facility-administered medications for this visit.      Diagnoses and all orders for this visit:    Benign paroxysmal positional vertigo, unspecified laterality    Seasonal allergies  -     Discontinue: methylPREDNISolone acetate (DEPO-medrol) injection 80 mg  -     methylPREDNISolone acetate (DEPO-medrol) injection 40 mg  -     methylPREDNISolone acetate (DEPO-medrol) injection 40 mg    Other orders  -     omeprazole (priLOSEC) 20 MG capsule; Take 20 mg by mouth Daily.  -     D-Mannose 500 MG capsule; Take 500 mg by mouth.           Epley on You Tube but if no change then consider lowering dose of aldactone or verapamil.      EMR Dragon/Transcription disclaimer:    Much of  this encounter note is an electronic transcription/translation of spoken language to printed text.  The electronic translation of spoken language may permit erroneous, or at times, nonsensical words or phrases to be inadvertently transcribed.  Although I have reviewed the note for such errors some may still exist.

## 2019-06-11 RX ORDER — ROSUVASTATIN CALCIUM 10 MG/1
TABLET, COATED ORAL
Qty: 90 TABLET | Refills: 3 | Status: SHIPPED | OUTPATIENT
Start: 2019-06-11 | End: 2020-08-19 | Stop reason: SDUPTHER

## 2019-06-14 ENCOUNTER — TELEPHONE (OUTPATIENT)
Dept: SPORTS MEDICINE | Facility: CLINIC | Age: 69
End: 2019-06-14

## 2019-06-14 DIAGNOSIS — I10 HYPERTENSION, UNSPECIFIED TYPE: ICD-10-CM

## 2019-06-14 RX ORDER — SPIRONOLACTONE 50 MG/1
50 TABLET, FILM COATED ORAL DAILY
Qty: 90 TABLET | Refills: 3 | Status: SHIPPED | OUTPATIENT
Start: 2019-06-14 | End: 2020-05-18

## 2019-06-14 RX ORDER — SPIRONOLACTONE 50 MG/1
50 TABLET, FILM COATED ORAL DAILY
Qty: 90 TABLET | Refills: 3 | Status: SHIPPED | OUTPATIENT
Start: 2019-06-14 | End: 2019-06-14 | Stop reason: SDUPTHER

## 2019-06-14 NOTE — TELEPHONE ENCOUNTER
Pt called and wants to know if you can send in a temporary supply to her local pharmacy for her spirolactone. She is in the process of getting [facility] with [facility] and she only has 6 pills left. Please advise, thank you.    Patient also gave me the number to [facility] stating that they told her someone has to answer clinical questions for them to process:   [phone]

## 2019-07-01 ENCOUNTER — OFFICE VISIT (OUTPATIENT)
Dept: SPORTS MEDICINE | Facility: CLINIC | Age: 69
End: 2019-07-01

## 2019-07-01 VITALS
DIASTOLIC BLOOD PRESSURE: 68 MMHG | BODY MASS INDEX: 22.53 KG/M2 | HEART RATE: 64 BPM | WEIGHT: 132 LBS | SYSTOLIC BLOOD PRESSURE: 130 MMHG | OXYGEN SATURATION: 97 % | HEIGHT: 64 IN

## 2019-07-01 DIAGNOSIS — M75.41 IMPINGEMENT SYNDROME OF RIGHT SHOULDER: Primary | ICD-10-CM

## 2019-07-01 PROCEDURE — 20610 DRAIN/INJ JOINT/BURSA W/O US: CPT | Performed by: FAMILY MEDICINE

## 2019-07-01 PROCEDURE — 99214 OFFICE O/P EST MOD 30 MIN: CPT | Performed by: FAMILY MEDICINE

## 2019-07-01 RX ORDER — TRIAMCINOLONE ACETONIDE 40 MG/ML
40 INJECTION, SUSPENSION INTRA-ARTICULAR; INTRAMUSCULAR ONCE
Status: COMPLETED | OUTPATIENT
Start: 2019-07-01 | End: 2019-07-01

## 2019-07-01 RX ADMIN — TRIAMCINOLONE ACETONIDE 40 MG: 40 INJECTION, SUSPENSION INTRA-ARTICULAR; INTRAMUSCULAR at 16:15

## 2019-07-01 NOTE — PROGRESS NOTES
"Karena is a 69 y.o. year old female evaluation of a problem that is new to this examiner.    Chief Complaint   Patient presents with   • Shoulder Pain     pt is here for right shoulder pain x 3 week no injuire        History of Present Illness   HPI   For the past 2 weeks patient has been having pain over the superior lateral portion of the right shoulder.  Pain is worse with shoulder abduction and internal rotation.  No known trauma.  Also noted the pain seems to come on if she rolls over onto her right shoulder at night.  No neck pain or paresthesias.  Patient had similar episode on the left several years ago received a subacromial steroid injection was very helpful.  Pain is described as a dull ache.      I have reviewed the patient's medical, family, and social history in detail and updated the computerized patient record.    Review of Systems   Constitutional: Negative for fever.   Musculoskeletal:        Per HPI   Skin: Negative for wound.   Neurological: Negative for numbness.   All other systems reviewed and are negative.      /68 (BP Location: Left arm, Patient Position: Sitting, Cuff Size: Adult)   Pulse 64   Ht 161.3 cm (63.5\")   Wt 59.9 kg (132 lb)   SpO2 97%   BMI 23.01 kg/m²      Physical Exam   Constitutional: She is oriented to person, place, and time. She appears well-developed and well-nourished.   HENT:   Head: Normocephalic and atraumatic.   Eyes: Conjunctivae and EOM are normal. Pupils are equal, round, and reactive to light.   Cardiovascular:   No peripheral edema   Pulmonary/Chest: Effort normal.   Musculoskeletal:   Right shoulder normal in general appearance.  Patient has some tenderness to palpation along the subacromial space.  Pain with full and empty can, positive Marino and Neer.  Negative [place].  Negative scarf test.  Motor 5 out of 5.   Neurological: She is alert and oriented to person, place, and time.   Skin: Skin is warm and dry.   Psychiatric: She has a normal mood " "and affect. Her behavior is normal.   Vitals reviewed.  Right Shoulder X-Ray  Indication: Pain  Views: AP Internal and External Rotation    Findings:  No fracture  No bony lesion  Normal soft tissues  Mild OA AC    No prior studies were available for comparison.    Patient with symptoms and exam consistent with subacromial impingement.  Treatment options discussed with patient and she would like to proceed with subacromial steroid injection.    Shoulder Injection Procedure Note    Right shoulder subacromial bursa injection was discussed with the patient in detail, including indication, risks, benefits, and alternatives. Verbal consent was given for the procedure. Injection was performed by physician.  Injection site was identified by physical examination and cleaned with Betadine and alcohol swabs. Prior to needle insertion, ethyl chloride spray was used for surface anesthesia. Sterile technique was used.  A 25-gauge, 1.5\" needle was directed to the joint from a(n) posterior approach. Injectate was passed into the subacromial bursa without difficulty. The needle was removed and a simple bandage was applied. The procedure was tolerated well without difficulty.    Injection mixture:  1% lidocaine without epinephrine: 3 mL  40 mg/mL triamcinolone acetonide: 1 mL        Current Outpatient Medications:   •  aspirin 81 MG tablet, Take  by mouth., Disp: , Rfl:   •  Biotin 10 MG tablet, Take  by mouth., Disp: , Rfl:   •  buPROPion XL (WELLBUTRIN XL) 300 MG 24 hr tablet, Take 1 tablet by mouth Daily., Disp: 90 tablet, Rfl: 3  •  Cetirizine HCl 10 MG capsule, Take  by mouth., Disp: , Rfl:   •  D-Mannose 500 MG capsule, Take 500 mg by mouth., Disp: , Rfl:   •  dicyclomine (BENTYL) 10 MG capsule, 1 q 6 h prn abdominal pain, Disp: 120 capsule, Rfl: 11  •  estradiol (ESTRACE) 0.1 MG/GM vaginal cream, INSERT 1 MG D UTD, Disp: , Rfl:   •  LORazepam (ATIVAN) 1 MG tablet, Take 1 tablet by mouth Every 8 (Eight) Hours As Needed for " Anxiety., Disp: 15 tablet, Rfl: 0  •  metoclopramide (REGLAN) 10 MG tablet, Take 1 tablet by mouth 4 (Four) Times a Day Before Meals & at Bedtime. (Patient taking differently: Take 10 mg by mouth 4 (Four) Times a Day Before Meals & at Bedtime. Patient taking PRN), Disp: 30 tablet, Rfl: 0  •  omeprazole (priLOSEC) 20 MG capsule, Take 20 mg by mouth Daily., Disp: , Rfl:   •  rosuvastatin (CRESTOR) 10 MG tablet, TAKE 1 TABLET DAILY, Disp: 90 tablet, Rfl: 3  •  spironolactone (ALDACTONE) 50 MG tablet, Take 1 tablet by mouth Daily., Disp: 90 tablet, Rfl: 3  •  verapamil (VERELAN) 240 MG 24 hr capsule, Take 1 capsule by mouth Daily., Disp: 90 capsule, Rfl: 3  No current facility-administered medications for this visit.      Diagnoses and all orders for this visit:    Impingement syndrome of right shoulder  -     XR Shoulder 2+ View Right  -     triamcinolone acetonide (KENALOG-40) injection 40 mg         Postinjection instructions given regarding ice and activity.  Also gave patient home exercise program.  Follow-up 4 to 5 weeks if no significant improvement.    EMR Dragon/Transcription disclaimer:    Much of this encounter note is an electronic transcription/translation of spoken language to printed text.  The electronic translation of spoken language may permit erroneous, or at times, nonsensical words or phrases to be inadvertently transcribed.  Although I have reviewed the note for such errors some may still exist.

## 2019-07-18 ENCOUNTER — TELEPHONE (OUTPATIENT)
Dept: SPORTS MEDICINE | Facility: CLINIC | Age: 69
End: 2019-07-18

## 2019-07-18 NOTE — TELEPHONE ENCOUNTER
Pt called in regards to fall that happened over the weekend pt stats she was getting up out of her recliner when her feet got tangled up in the blanket on floor and she fell hitting her ribs on arm of the chair then on floor pt stats she has been doing at home remedies  Stats she ice her ribs for the first 24 hours has been sleeping laying straight in her recliner pt stats she is still in a lot of discomfort pt was wondering if she needed to come in or go to the [facility] or just continue with what she is doing at home       Please advice

## 2019-07-30 ENCOUNTER — OFFICE VISIT (OUTPATIENT)
Dept: SPORTS MEDICINE | Facility: CLINIC | Age: 69
End: 2019-07-30

## 2019-07-30 VITALS
DIASTOLIC BLOOD PRESSURE: 52 MMHG | SYSTOLIC BLOOD PRESSURE: 148 MMHG | BODY MASS INDEX: 23.56 KG/M2 | HEIGHT: 64 IN | OXYGEN SATURATION: 97 % | TEMPERATURE: 98.3 F | WEIGHT: 138 LBS | HEART RATE: 75 BPM

## 2019-07-30 DIAGNOSIS — S29.9XXA INJURY OF CHEST WALL, INITIAL ENCOUNTER: Primary | ICD-10-CM

## 2019-07-30 PROCEDURE — 99214 OFFICE O/P EST MOD 30 MIN: CPT | Performed by: FAMILY MEDICINE

## 2019-07-30 PROCEDURE — 71101 X-RAY EXAM UNILAT RIBS/CHEST: CPT | Performed by: FAMILY MEDICINE

## 2019-07-30 RX ORDER — LIDOCAINE 50 MG/G
1 PATCH TOPICAL EVERY 24 HOURS
Qty: 30 EACH | Refills: 0 | Status: SHIPPED | OUTPATIENT
Start: 2019-07-30 | End: 2021-06-09

## 2019-07-30 NOTE — PROGRESS NOTES
"Karena is a 69 y.o. year old female evaluation of a problem that is new to this examiner.    Chief Complaint   Patient presents with   • Rib Pain     pt fell a few weeks ago, hit her left side. still painful        History of Present Illness   HPI   2 weeks ago patient had a fall at home injuring the left side of her chest falling against the arm of a chair.  Patient has been treating with ibuprofen and ice.  She is been able to take a deep breath.  She is been doing exercises for deep breathing at home.  She has had no fever chills or cough.  She still has some discomfort in this area and her family suggested she come in for an x-ray.  Patient now only has pain in this area with direct pressure or with a cough or sneeze.  Pain is described as sharp in nature.    I have reviewed the patient's medical, family, and social history in detail and updated the computerized patient record.    Review of Systems   Constitutional: Negative for fever.   Musculoskeletal:        Per HPI   Skin: Negative for wound.   Neurological: Negative for numbness.   All other systems reviewed and are negative.      /52 (BP Location: Right arm, Patient Position: Sitting, Cuff Size: Adult)   Pulse 75   Temp 98.3 °F (36.8 °C) (Oral)   Ht 161.3 cm (63.5\")   Wt 62.6 kg (138 lb)   SpO2 97%   BMI 24.06 kg/m²      Physical Exam   Constitutional: She is oriented to person, place, and time. She appears well-developed and well-nourished.   HENT:   Head: Normocephalic and atraumatic.   Eyes: Conjunctivae and EOM are normal. Pupils are equal, round, and reactive to light.   Cardiovascular:   No peripheral edema   Pulmonary/Chest: Effort normal and breath sounds normal. No stridor. She has no wheezes.   Patient has an area of tenderness along the left T8 rib in the midclavicular line.   Neurological: She is alert and oriented to person, place, and time.   Skin: Skin is warm and dry.   Psychiatric: She has a normal mood and affect. Her behavior " is normal.   Vitals reviewed.  Chest X-Ray  Indication: CP  Views: PA and Lateral    Findings:  Normal lung markings.  I do not see any convincing areas for fracture.    There were no previous studies available for comparison.        Current Outpatient Medications:   •  aspirin 81 MG tablet, Take  by mouth., Disp: , Rfl:   •  Biotin 10 MG tablet, Take  by mouth., Disp: , Rfl:   •  buPROPion XL (WELLBUTRIN XL) 300 MG 24 hr tablet, Take 1 tablet by mouth Daily., Disp: 90 tablet, Rfl: 3  •  Cetirizine HCl 10 MG capsule, Take  by mouth., Disp: , Rfl:   •  D-Mannose 500 MG capsule, Take 500 mg by mouth., Disp: , Rfl:   •  dicyclomine (BENTYL) 10 MG capsule, 1 q 6 h prn abdominal pain, Disp: 120 capsule, Rfl: 11  •  estradiol (ESTRACE) 0.1 MG/GM vaginal cream, INSERT 1 MG D UTD, Disp: , Rfl:   •  LORazepam (ATIVAN) 1 MG tablet, Take 1 tablet by mouth Every 8 (Eight) Hours As Needed for Anxiety., Disp: 15 tablet, Rfl: 0  •  metoclopramide (REGLAN) 10 MG tablet, Take 1 tablet by mouth 4 (Four) Times a Day Before Meals & at Bedtime. (Patient taking differently: Take 10 mg by mouth 4 (Four) Times a Day Before Meals & at Bedtime. Patient taking PRN), Disp: 30 tablet, Rfl: 0  •  omeprazole (priLOSEC) 20 MG capsule, Take 20 mg by mouth Daily., Disp: , Rfl:   •  rosuvastatin (CRESTOR) 10 MG tablet, TAKE 1 TABLET DAILY, Disp: 90 tablet, Rfl: 3  •  spironolactone (ALDACTONE) 50 MG tablet, Take 1 tablet by mouth Daily., Disp: 90 tablet, Rfl: 3  •  verapamil (VERELAN) 240 MG 24 hr capsule, Take 1 capsule by mouth Daily., Disp: 90 capsule, Rfl: 3  •  lidocaine (LIDODERM) 5 %, Place 1 patch on the skin as directed by provider Daily. Remove & Discard patch within 12 hours or as directed by MD, Disp: 30 each, Rfl: 0     Diagnoses and all orders for this visit:    Injury of chest wall, initial encounter  -     XR Ribs Left With PA Chest  -     lidocaine (LIDODERM) 5 %; Place 1 patch on the skin as directed by provider Daily. Remove &  Discard patch within 12 hours or as directed by MD    Patient has seen significant improvement since the original injury.  This should continue to improve over the next 3 weeks.  Follow-up here if no significant improvement by that time.      EMR Dragon/Transcription disclaimer:    Much of this encounter note is an electronic transcription/translation of spoken language to printed text.  The electronic translation of spoken language may permit erroneous, or at times, nonsensical words or phrases to be inadvertently transcribed.  Although I have reviewed the note for such errors some may still exist.

## 2019-09-16 ENCOUNTER — TELEPHONE (OUTPATIENT)
Dept: SPORTS MEDICINE | Facility: CLINIC | Age: 69
End: 2019-09-16

## 2019-09-16 ENCOUNTER — TELEPHONE (OUTPATIENT)
Dept: ORTHOPEDIC SURGERY | Facility: CLINIC | Age: 69
End: 2019-09-16

## 2019-09-16 NOTE — TELEPHONE ENCOUNTER
Patient called and asked to make appointment with you, and you have no opening this week. Had xrays done at [facility], report below. When can you see her, or does she need to see someone for hand/wrist?        EXAMINATION(S): XR WRIST COMP MIN 3 VW RT        DATE: 09/14/2019        HISTORY: fall and hit right wrist on ground from standing/bruising-contusion over the right proximal radius. Acute pain of the right wrist         COMPARISON: Right hand series March 7, 2014        FINDING(S): 3 views of the right wrist was/were submitted for review. There is an acute intra-articular fracture involving central portion of the distal right radius. This has extension in the proximal radiocarpal row.        There may be an additional nondisplaced fracture of the right ulnar styloid at its base. No other fractures of the right carpus are seen. 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        IMPRESSION:         1.Acute intra-articular fracture of the central distal right radius    2.Nondisplaced fracture at the base of the right ulnar styloid        Dictated by: Ariel Stevenson M.D.        Images and Report reviewed and interpreted by: Ariel Stevenson M.D.

## 2019-09-16 NOTE — TELEPHONE ENCOUNTER
Pt called and wants to know where or who do you suggest she sees for a wrist fracture. She states that it happened over the weekend and she fractured it in two places. Please advise, thank you!

## 2019-09-17 ENCOUNTER — OFFICE VISIT (OUTPATIENT)
Dept: ORTHOPEDIC SURGERY | Facility: CLINIC | Age: 69
End: 2019-09-17

## 2019-09-17 VITALS
HEART RATE: 71 BPM | DIASTOLIC BLOOD PRESSURE: 82 MMHG | WEIGHT: 138 LBS | HEIGHT: 63 IN | SYSTOLIC BLOOD PRESSURE: 154 MMHG | BODY MASS INDEX: 24.45 KG/M2

## 2019-09-17 DIAGNOSIS — S52.571A OTHER CLOSED INTRA-ARTICULAR FRACTURE OF DISTAL END OF RIGHT RADIUS, INITIAL ENCOUNTER: ICD-10-CM

## 2019-09-17 DIAGNOSIS — R52 PAIN: Primary | ICD-10-CM

## 2019-09-17 PROCEDURE — 99203 OFFICE O/P NEW LOW 30 MIN: CPT | Performed by: ORTHOPAEDIC SURGERY

## 2019-09-17 PROCEDURE — 25600 CLTX DST RDL FX/EPHYS SEP WO: CPT | Performed by: ORTHOPAEDIC SURGERY

## 2019-09-17 PROCEDURE — 73100 X-RAY EXAM OF WRIST: CPT | Performed by: ORTHOPAEDIC SURGERY

## 2019-09-17 NOTE — PROGRESS NOTES
Subjective:     Patient ID: Karena Perez is a 69 y.o. female.    Chief Complaint: right wrist pain, new patient to provider    History of Present Illness  Karena Perez presents to clinic today for evaluation of her right wrist. On 19, she states she was out picking tomatoes and stooped down to  some on the ground when she fell backwards off of the raised garden bed into the yard. When she fell, she landed flat on her right wrist. She noted an immediate onset of pain. She states she sprained her right wrist as a young girl while falling off her horse. She also has a history of carpal tunnel in her right wrist.   Today, she rates the pain as 1-2/10, describes it as aching in nature.    Localizes pain to volar and dorsal distal radius.    Has noted improvement with wearing a brace and taking ibuprofen.     Denies radiation of pain, denies associated numbness or tingling.    Social History     Occupational History   • Not on file   Tobacco Use   • Smoking status: Former Smoker     Last attempt to quit: 1990     Years since quittin.7   • Smokeless tobacco: Never Used   • Tobacco comment: Smoking in social settings   Substance and Sexual Activity   • Alcohol use: No   • Drug use: No   • Sexual activity: Yes     Partners: Male     Birth control/protection: Post-menopausal      Past Medical History:   Diagnosis Date   • Anxiety    • GERD (gastroesophageal reflux disease)    • Hyperlipidemia    • Hypertension    • Irritable bowel syndrome      Past Surgical History:   Procedure Laterality Date   • APPENDECTOMY  1968    Removed along w/gall bladder   • BREAST BIOPSY     • CHOLECYSTECTOMY     • COLONOSCOPY  2015    HP polyp, IH.   • COLONOSCOPY  2011    EH, IH   • COLONOSCOPY N/A 2018    Procedure: COLONOSCOPY WITH POLYPECTOMY (COLD BX);  Surgeon: Jona Villavicencio MD;  Location: [facility] ENDOSCOPY;  Service: Gastroenterology   • ENDOSCOPY N/A 2018    Procedure: ESOPHAGOGASTRODUODENOSCOPY  "WITH BIOPSIES PARSONS DILATION;  Surgeon: Jona Villavicencio MD;  Location: [facility] ENDOSCOPY;  Service: Gastroenterology   • TUBAL ABDOMINAL LIGATION  1968       Family History   Problem Relation Age of Onset   • Alzheimer's disease Mother    • Hypertension Mother    • Colon cancer Father    • Hypertension Father          Review of Systems   Constitutional: Negative for chills, diaphoresis, fever and unexpected weight change.   HENT: Negative for hearing loss, nosebleeds, sore throat and tinnitus.    Eyes: Negative for pain and visual disturbance.   Respiratory: Negative for cough, shortness of breath and wheezing.    Cardiovascular: Negative for chest pain and palpitations.   Gastrointestinal: Negative for abdominal pain, diarrhea, nausea and vomiting.   Endocrine: Negative for cold intolerance, heat intolerance and polydipsia.   Genitourinary: Negative for difficulty urinating, dysuria and hematuria.   Musculoskeletal: Positive for arthralgias. Negative for joint swelling and myalgias.   Skin: Negative for rash and wound.   Allergic/Immunologic: Negative for environmental allergies.   Neurological: Negative for dizziness, syncope and numbness.   Hematological: Does not bruise/bleed easily.   Psychiatric/Behavioral: Negative for dysphoric mood and sleep disturbance. The patient is not nervous/anxious.    All other systems reviewed and are negative.          Objective:  Vitals:    09/17/19 1029   BP: 154/82   Pulse: 71   Weight: 62.6 kg (138 lb)   Height: 160 cm (63\")         09/17/19  1029   Weight: 62.6 kg (138 lb)     Body mass index is 24.45 kg/m².  Physical Exam    Vital signs reviewed.   General: No acute distress, alert and oriented  Eyes: conjunctiva clear; pupils equally round and reactive  ENT: external ears and nose atraumatic; oropharynx clear  CV: no peripheral edema  Resp: normal respiratory effort  Skin: no rashes or wounds; normal turgor  Psych: mood and affect appropriate; recent and remote memory " intact      Ortho Exam       Right wrist-  Flexion to 60 degrees  Extension to 40 degrees  Moderate soft tissue swelling with ecchymosis over volar distal radius  Max tenderness over volar and radial border of distal radius  Moderate tenderness over ulnar styloid  Radial and ulnar deviation 10 degrees  No tenderness over anatomic snuffbox  4+/5 strength on finger flexion and extension  Positive thumbs up  Positive sensation all distributions    Imaging:  Review of outside three-view x-rays of right wrist including review of images as well as review of 1 view lateral x-ray right wrist from today's visit, ordered and reviewed by me, indication right wrist pain, compared to outside images.  Findings include stable minimally displaced distal radius fracture that does appear to be extending to the articular surface with minimal evidence of dorsal tilt of the distal segment and moderate evidence of degenerative change at radiocarpal joint.  Acceptable overall alignment of fracture site noted.  Assessment:        1. Pain    2. Other closed intra-articular fracture of distal end of right radius, initial encounter           Plan:          1. Discussed treatment options at length with patient at today's visit.   2. Patient given exos wrist brace today for stabilization of the right wrist.  3. Patient instructed to remove brace for showering and at that time she may perform gentle stretches.   4. Follow-up in 3 weeks with repeat x-ray of right wrist.       Karena Perez was in agreement with plan and had all questions answered.     Orders:  Orders Placed This Encounter   Procedures   • XR Wrist 2 View Right       Medications:  No orders of the defined types were placed in this encounter.      Followup:  Return in about 3 weeks (around 10/8/2019) for xrays needed at follow up.    Karena was seen today for pain.    Diagnoses and all orders for this visit:    Pain  -     XR Wrist 2 View Right    Other closed intra-articular  fracture of distal end of right radius, initial encounter        By signing my name here, I Cheryl Mccain, attest that all documentation on 09/20/19 at 10:34 PM has been prepared under the direction and in the presence of Dr. Ramon Mo.    I, Dr. Ramon Mo, personally performed the services described in this documentation, as scribed by Cheryl Mccain, in my presence, and it is both accurate and complete.    Dictated utilizing Dragon dictation

## 2019-09-20 PROBLEM — S52.501A CLOSED FRACTURE OF RIGHT DISTAL RADIUS: Status: ACTIVE | Noted: 2019-09-20

## 2019-10-01 ENCOUNTER — OFFICE VISIT (OUTPATIENT)
Dept: ORTHOPEDIC SURGERY | Facility: CLINIC | Age: 69
End: 2019-10-01

## 2019-10-01 ENCOUNTER — TELEPHONE (OUTPATIENT)
Dept: ORTHOPEDIC SURGERY | Facility: CLINIC | Age: 69
End: 2019-10-01

## 2019-10-01 VITALS — WEIGHT: 138 LBS | BODY MASS INDEX: 24.45 KG/M2 | HEIGHT: 63 IN

## 2019-10-01 DIAGNOSIS — R52 PAIN: Primary | ICD-10-CM

## 2019-10-01 PROCEDURE — 73110 X-RAY EXAM OF WRIST: CPT | Performed by: ORTHOPAEDIC SURGERY

## 2019-10-01 PROCEDURE — 99024 POSTOP FOLLOW-UP VISIT: CPT | Performed by: ORTHOPAEDIC SURGERY

## 2019-10-01 NOTE — PROGRESS NOTES
CC: Closed treatment of distal radius fracture, DOI 9/13/19    Interval History: Patient returns to clinic today stating her pain is doing well, but complains of recent onset of numbness and tingling in the right hand and fingers. She has been wearing a right wrist brace as instructed.    Exam:  Right wrist-   Maximal tenderness over the palmar aspect of the long and ring finger MCP joints   Positive Durkan's and Phalens tests over right carpal tunnel   Positive sensation all distributions in the right hand compared to the left   Negative Springer shift test   Flexion to 65 degrees   Extension to 60 degrees   4+/5 strength   Minimal tenderness over the distal radius    Imaging:  Three-view x-rays right wrist from today's visit, AP, lateral, oblique views, ordered by me ,indication distal radius fracture.  The metaphyseal portion of the fracture appears to be well-healed at this point time.  Significant DISI deformity of wrist with advanced intercarpal and moderate radiocarpal arthritic change, including extension over to the trapeziometacarpal joint of the thumb.  No evidence of significant widening the scapholunate interval.    Impression: Closed treatment of distal radius fracture, DOI 9/13/19    Plan:  1. Patient had all questions answered today and was in agreement with treatment plan.  2. I recommend we wean away from the right wrist brace and transition to a Neoprene wrap or sleeve.  3. I advised her to ice the wrist and take Ibuprofen as needed for pain relief.  4. Follow up with me in 3 weeks with repeat x-ray of the right wrist.    By signing my name here, I Lauro De La Rosa, attest that all documentation on 10/01/19 at 4:49 PM has been prepared under the direction and in the presence of Dr. Ramon Mo.    I, Dr. Ramon Mo, personally performed the services described in this documentation, as scribed by Lauro De La Rosa, in my presence, and it is both accurate and complete.

## 2019-10-01 NOTE — TELEPHONE ENCOUNTER
Ms. Perez has started having numbness and tingling in her right hand down into her fingers.  Says that it gets very painful and is taking ibuprofen to help the pain.  I asked her about swelling, says that she isn't having any issues with that at all.  She is concerned considering her recent fracture.

## 2019-10-24 ENCOUNTER — OFFICE VISIT (OUTPATIENT)
Dept: ORTHOPEDIC SURGERY | Facility: CLINIC | Age: 69
End: 2019-10-24

## 2019-10-24 DIAGNOSIS — S52.571A OTHER CLOSED INTRA-ARTICULAR FRACTURE OF DISTAL END OF RIGHT RADIUS, INITIAL ENCOUNTER: Primary | ICD-10-CM

## 2019-10-24 PROCEDURE — 99024 POSTOP FOLLOW-UP VISIT: CPT | Performed by: ORTHOPAEDIC SURGERY

## 2019-10-24 PROCEDURE — 73110 X-RAY EXAM OF WRIST: CPT | Performed by: ORTHOPAEDIC SURGERY

## 2019-10-24 NOTE — PROGRESS NOTES
CC: Closed treatment of distal radius fracture, DOI 9/13/19    Interval History: Patient returns to clinic today stating her pain is doing well, she still does have some mild residual pain of the dorsal ulnar side of her radius which she rates as a 2-3 out of 10, she is using a soft brace with activity for protection instability.  Denies any numbness or tingling.  Exam:  Right wrist-   Maximal tenderness over the dorsal aspect of distal radius particular on the ulnar side   Mildly positive Durkan's and Phalens tests over right carpal tunnel   Positive sensation all distributions in the right hand compared to the left   Negative Springer shift test   Flexion to 70 degrees   Extension to 70 degrees   4+/5 strength     Imaging:  Three-view x-rays right wrist from today's visit, AP, lateral, oblique views, ordered by me ,indication distal radius fracture.  Fracture appears to be well-healed at this point time in the distal radius with maintained DISI deformity and carpal arthritic changes appreciated, mild degenerative change DRUJ.  Compared to prior office x-rays.    Impression: Closed treatment of distal radius fracture, DOI 9/13/19    Plan:  1. Patient had all questions answered today and was in agreement with treatment plan.  2. Continue intermittent use of antifungal medications as needed for pain, continue use of soft brace.    3. Follow up with me in 4 weeks with repeat x-ray of the right wrist.

## 2019-11-21 ENCOUNTER — OFFICE VISIT (OUTPATIENT)
Dept: ORTHOPEDIC SURGERY | Facility: CLINIC | Age: 69
End: 2019-11-21

## 2019-11-21 DIAGNOSIS — S52.571A OTHER CLOSED INTRA-ARTICULAR FRACTURE OF DISTAL END OF RIGHT RADIUS, INITIAL ENCOUNTER: Primary | ICD-10-CM

## 2019-11-21 PROCEDURE — 73110 X-RAY EXAM OF WRIST: CPT | Performed by: ORTHOPAEDIC SURGERY

## 2019-11-21 PROCEDURE — 99024 POSTOP FOLLOW-UP VISIT: CPT | Performed by: ORTHOPAEDIC SURGERY

## 2019-11-21 NOTE — PROGRESS NOTES
CC: Closed treatment of right distal radius fracture, DOI 9/13/19    Interval History: Patient returns to clinic today stating she is making progress at this point time regards to motion and strength but she still notes some residual intermittent aching which she rates as a 1-2 out of 10 primarily over the dorsal aspect of the distal radius but extending also into the carpal region with mild associated swelling.  Still notes some stiffness.  She is using a soft wrist brace for support with resisted activities.    Exam:    Right wrist-     Moderate tenderness over the dorsal aspect of radiocarpal and intercarpal joints with mild swelling in this region   Positive sensation all distributions in the right hand compared to the left   Negative Springer shift test   Flexion to 75 degrees   Extension to 75 degrees   4+/5 strength     Imaging: Three-view x-rays right wrist from today's visit, ordered and reviewed by me, indication status post closed treatment distal radius fracture.  Fracture well-healed at this time, maintained moderate radiocarpal and intercarpal arthritic change noted with minimal improvement on today's exam and positioning with DISI deformity.  Compared to prior office x-rays.    Impression: Closed treatment of distal radius fracture, DOI 9/13/19    Plan:  1. Offered occupational therapy the patient was to focus on home exercises which were demonstrated to her today  2. Also discussed prescription anti-inflammatories but she states that she has not been able to tolerate these in the past, she will work with ibuprofen which she knows that she can handle.  She will take it on a scheduled basis for the next 7 to 10 days as tolerated.  If symptoms do get worse or do not improve she will contact our office for consideration of formal occupational therapy or even consideration of referral to hand surgeon particularly given her radiocarpal and advanced intercarpal arthritic change.  3. Patient had all questions  answered today and was in agreement with treatment plan.

## 2019-12-12 ENCOUNTER — TELEPHONE (OUTPATIENT)
Dept: SPORTS MEDICINE | Facility: CLINIC | Age: 69
End: 2019-12-12

## 2019-12-12 RX ORDER — METAXALONE 800 MG/1
800 TABLET ORAL 3 TIMES DAILY PRN
Qty: 90 TABLET | Refills: 1 | Status: SHIPPED | OUTPATIENT
Start: 2019-12-12 | End: 2020-07-29 | Stop reason: SDUPTHER

## 2020-01-10 DIAGNOSIS — I10 ESSENTIAL HYPERTENSION: ICD-10-CM

## 2020-01-10 RX ORDER — VERAPAMIL HYDROCHLORIDE 240 MG/1
240 CAPSULE, EXTENDED RELEASE ORAL DAILY
Qty: 90 CAPSULE | Refills: 3 | Status: SHIPPED | OUTPATIENT
Start: 2020-01-10 | End: 2020-12-23

## 2020-01-31 ENCOUNTER — APPOINTMENT (OUTPATIENT)
Dept: WOMENS IMAGING | Facility: HOSPITAL | Age: 70
End: 2020-01-31

## 2020-01-31 PROCEDURE — 77067 SCR MAMMO BI INCL CAD: CPT | Performed by: RADIOLOGY

## 2020-01-31 PROCEDURE — 77063 BREAST TOMOSYNTHESIS BI: CPT | Performed by: RADIOLOGY

## 2020-05-17 DIAGNOSIS — F33.0 MILD EPISODE OF RECURRENT MAJOR DEPRESSIVE DISORDER (HCC): ICD-10-CM

## 2020-05-17 DIAGNOSIS — I10 HYPERTENSION, UNSPECIFIED TYPE: ICD-10-CM

## 2020-05-18 RX ORDER — BUPROPION HYDROCHLORIDE 300 MG/1
TABLET ORAL
Qty: 90 TABLET | Refills: 3 | Status: SHIPPED | OUTPATIENT
Start: 2020-05-18 | End: 2021-05-10

## 2020-05-18 RX ORDER — SPIRONOLACTONE 50 MG/1
TABLET, FILM COATED ORAL
Qty: 90 TABLET | Refills: 3 | Status: SHIPPED | OUTPATIENT
Start: 2020-05-18 | End: 2021-05-10

## 2020-07-29 RX ORDER — METAXALONE 800 MG/1
800 TABLET ORAL 3 TIMES DAILY PRN
Qty: 90 TABLET | Refills: 1 | Status: SHIPPED | OUTPATIENT
Start: 2020-07-29 | End: 2021-06-09

## 2020-08-19 RX ORDER — ROSUVASTATIN CALCIUM 10 MG/1
10 TABLET, COATED ORAL DAILY
Qty: 90 TABLET | Refills: 3 | Status: SHIPPED | OUTPATIENT
Start: 2020-08-19 | End: 2021-08-02

## 2020-12-22 DIAGNOSIS — I10 ESSENTIAL HYPERTENSION: ICD-10-CM

## 2020-12-23 RX ORDER — VERAPAMIL HYDROCHLORIDE 240 MG/1
CAPSULE, EXTENDED RELEASE ORAL
Qty: 90 CAPSULE | Refills: 3 | Status: SHIPPED | OUTPATIENT
Start: 2020-12-23 | End: 2021-12-14

## 2021-03-09 DIAGNOSIS — Z23 IMMUNIZATION DUE: ICD-10-CM

## 2021-03-10 ENCOUNTER — TELEPHONE (OUTPATIENT)
Dept: GASTROENTEROLOGY | Facility: CLINIC | Age: 71
End: 2021-03-10

## 2021-03-10 NOTE — TELEPHONE ENCOUNTER
----- Message from Florencia Liang Rep sent at 3/10/2021 10:10 AM EST -----  Regarding: Routine Scope  Contact: [phone]  PT needs to complete open access for a routine scope

## 2021-03-10 NOTE — TELEPHONE ENCOUNTER
Spoke with pt mailed OA history form to be filled out and mailed back for MD to place orders. Last colonoscopy 9- personal history colon polyps

## 2021-03-31 NOTE — TELEPHONE ENCOUNTER
LAST CS 9/11/18, REPORT IS IN Epic     PERSONAL HX OF COLON POLYPS     FAMILY HX OF COLON POLYPS / CA (FATHER)    PT IS TAKING A LOW DOSE ASPRIN     NO ASA    RX:  aspirin 81 MG tablet  Biotin 10 MG tablet  buPROPion XL (WELLBUTRIN XL) 300 MG 24 hr tablet  D-Mannose 500 MG capsule  omeprazole (priLOSEC) 20 MG capsule  rosuvastatin (CRESTOR) 10 MG tablet  spironolactone (ALDACTONE) 50 MG tablet  verapamil ER (VERELAN) 240 MG 24 hr c.    OA FORM HAS BEEN SCANNED INTO MEDIA

## 2021-05-10 DIAGNOSIS — F33.0 MILD EPISODE OF RECURRENT MAJOR DEPRESSIVE DISORDER (HCC): ICD-10-CM

## 2021-05-10 DIAGNOSIS — I10 HYPERTENSION, UNSPECIFIED TYPE: ICD-10-CM

## 2021-05-10 RX ORDER — SPIRONOLACTONE 50 MG/1
TABLET, FILM COATED ORAL
Qty: 90 TABLET | Refills: 3 | Status: SHIPPED | OUTPATIENT
Start: 2021-05-10 | End: 2022-04-28

## 2021-05-10 RX ORDER — BUPROPION HYDROCHLORIDE 300 MG/1
TABLET ORAL
Qty: 90 TABLET | Refills: 3 | Status: SHIPPED | OUTPATIENT
Start: 2021-05-10 | End: 2022-04-28

## 2021-05-31 ENCOUNTER — PREP FOR SURGERY (OUTPATIENT)
Dept: OTHER | Facility: HOSPITAL | Age: 71
End: 2021-05-31

## 2021-05-31 DIAGNOSIS — K63.5 COLON POLYP: Primary | ICD-10-CM

## 2021-05-31 DIAGNOSIS — Z80.0 FH: COLON CANCER: ICD-10-CM

## 2021-06-09 ENCOUNTER — OFFICE VISIT (OUTPATIENT)
Dept: SPORTS MEDICINE | Facility: CLINIC | Age: 71
End: 2021-06-09

## 2021-06-09 ENCOUNTER — TELEPHONE (OUTPATIENT)
Dept: GASTROENTEROLOGY | Facility: CLINIC | Age: 71
End: 2021-06-09

## 2021-06-09 VITALS
SYSTOLIC BLOOD PRESSURE: 140 MMHG | HEART RATE: 80 BPM | BODY MASS INDEX: 23.04 KG/M2 | HEIGHT: 63 IN | TEMPERATURE: 98 F | DIASTOLIC BLOOD PRESSURE: 80 MMHG | WEIGHT: 130 LBS | OXYGEN SATURATION: 97 %

## 2021-06-09 DIAGNOSIS — I10 ESSENTIAL HYPERTENSION: Chronic | ICD-10-CM

## 2021-06-09 DIAGNOSIS — E78.5 HYPERLIPIDEMIA, UNSPECIFIED HYPERLIPIDEMIA TYPE: Chronic | ICD-10-CM

## 2021-06-09 DIAGNOSIS — Z12.11 SCREEN FOR COLON CANCER: ICD-10-CM

## 2021-06-09 DIAGNOSIS — Z00.00 MEDICARE ANNUAL WELLNESS VISIT, SUBSEQUENT: Primary | ICD-10-CM

## 2021-06-09 DIAGNOSIS — R79.9 ABNORMAL FINDING OF BLOOD CHEMISTRY, UNSPECIFIED: ICD-10-CM

## 2021-06-09 DIAGNOSIS — Z23 IMMUNIZATION DUE: ICD-10-CM

## 2021-06-09 PROCEDURE — 1170F FXNL STATUS ASSESSED: CPT | Performed by: FAMILY MEDICINE

## 2021-06-09 PROCEDURE — 90732 PPSV23 VACC 2 YRS+ SUBQ/IM: CPT | Performed by: FAMILY MEDICINE

## 2021-06-09 PROCEDURE — 1159F MED LIST DOCD IN RCRD: CPT | Performed by: FAMILY MEDICINE

## 2021-06-09 PROCEDURE — G0009 ADMIN PNEUMOCOCCAL VACCINE: HCPCS | Performed by: FAMILY MEDICINE

## 2021-06-09 PROCEDURE — G0439 PPPS, SUBSEQ VISIT: HCPCS | Performed by: FAMILY MEDICINE

## 2021-06-09 NOTE — PROGRESS NOTES
QUICK REFERENCE INFORMATION:  The ABCs of the Annual Wellness Visit    Subsequent Medicare Wellness Visit    HEALTH RISK ASSESSMENT    1950    Recent Hospitalizations:  No hospitalization(s) within the last year..        Current Medical Providers:  Patient Care Team:  Hakan Ortiz MD as PCP - General  Hakan Ortiz MD as PCP - Family Medicine        Smoking Status:  Social History     Tobacco Use   Smoking Status Former Smoker   • Quit date: 1990   • Years since quittin.4   Smokeless Tobacco Never Used   Tobacco Comment    Smoking in social settings       Alcohol Consumption:  Social History     Substance and Sexual Activity   Alcohol Use No       Depression Screen:   PHQ-2/PHQ-9 Depression Screening 2019   Little interest or pleasure in doing things 0   Feeling down, depressed, or hopeless 0   Total Score 0       Health Habits and Functional and Cognitive Screening:  Functional & Cognitive Status 2019   Do you have difficulty preparing food and eating? No   Do you have difficulty bathing yourself, getting dressed or grooming yourself? No   Do you have difficulty using the toilet? No   Do you have difficulty moving around from place to place? No   Do you have trouble with steps or getting out of a bed or a chair? No   Current Diet Well Balanced Diet   Dental Exam Up to date   Eye Exam Up to date   Exercise (times per week) 7 times per week   Current Exercise Activities Include (No Data)   Do you need help using the phone?  No   Are you deaf or do you have serious difficulty hearing?  No   Do you need help with transportation? No   Do you need help shopping? No   Do you need help preparing meals?  No   Do you need help with housework?  No   Do you need help with laundry? No   Do you need help taking your medications? No   Do you need help managing money? No   Do you ever drive or ride in a car without wearing a seat belt? No   Have you felt unusual stress, anger or  loneliness in the last month? No   Who do you live with? Spouse   If you need help, do you have trouble finding someone available to you? No   Have you been bothered in the last four weeks by sexual problems? No   Do you have difficulty concentrating, remembering or making decisions? No           Does the patient have evidence of cognitive impairment? No    Aspirin use counseling: Taking ASA appropriately as indicated      Recent Lab Results:  CMP:  Lab Results   Component Value Date    GLU 86 01/15/2019    BUN 8 01/15/2019    CREATININE 0.80 01/15/2019    EGFRIFNONA 71 01/15/2019    EGFRIFAFRI 86 01/15/2019    BCR 10.0 01/15/2019     01/15/2019    K 4.5 01/15/2019    CO2 27.1 01/15/2019    CALCIUM 9.5 01/15/2019    PROTENTOTREF 7.1 01/15/2019    ALBUMIN 4.30 01/15/2019    LABGLOBREF 2.8 01/15/2019    LABIL2 1.5 01/15/2019    BILITOT 0.3 01/15/2019    ALKPHOS 95 01/15/2019    AST 21 01/15/2019    ALT 18 01/15/2019     Lipid Panel:  Lab Results   Component Value Date    TRIG 67 01/15/2019    HDL 48 01/15/2019    VLDL 13.4 01/15/2019     HbA1c:       Visual Acuity:  No exam data present    Age-appropriate Screening Schedule:  Refer to the list below for future screening recommendations based on patient's age, sex and/or medical conditions. Orders for these recommended tests are listed in the plan section. The patient has been provided with a written plan.    Health Maintenance   Topic Date Due   • DXA SCAN  Never done   • ZOSTER VACCINE (2 of 2) 11/16/2015   • MAMMOGRAM  Never done   • LIPID PANEL  01/15/2020   • INFLUENZA VACCINE  08/01/2021   • TDAP/TD VACCINES (3 - Td or Tdap) 10/25/2026        Subjective   History of Present Illness    Karena Perez is a 71 y.o. female who presents for an Subsequent Wellness Visit.    The following portions of the patient's history were reviewed and updated as appropriate: allergies, current medications, past family history, past medical history, past social history, past  surgical history and problem list.    Outpatient Medications Prior to Visit   Medication Sig Dispense Refill   • aspirin 81 MG tablet Take  by mouth.     • Biotin 10 MG tablet Take  by mouth.     • buPROPion XL (WELLBUTRIN XL) 300 MG 24 hr tablet TAKE 1 TABLET DAILY 90 tablet 3   • Cetirizine HCl 10 MG capsule Take  by mouth.     • D-Mannose 500 MG capsule Take 500 mg by mouth.     • dicyclomine (BENTYL) 10 MG capsule 1 q 6 h prn abdominal pain 120 capsule 11   • estradiol (ESTRACE) 0.1 MG/GM vaginal cream INSERT 1 MG D UTD     • lidocaine (LIDODERM) 5 % Place 1 patch on the skin as directed by provider Daily. Remove & Discard patch within 12 hours or as directed by MD 30 each 0   • LORazepam (ATIVAN) 1 MG tablet Take 1 tablet by mouth Every 8 (Eight) Hours As Needed for Anxiety. 15 tablet 0   • metaxalone (Skelaxin) 800 MG tablet Take 1 tablet by mouth 3 (Three) Times a Day As Needed for Muscle Spasms. 90 tablet 1   • metoclopramide (REGLAN) 10 MG tablet Take 1 tablet by mouth 4 (Four) Times a Day Before Meals & at Bedtime. (Patient taking differently: Take 10 mg by mouth 4 (Four) Times a Day Before Meals & at Bedtime. Patient taking PRN) 30 tablet 0   • omeprazole (priLOSEC) 20 MG capsule Take 20 mg by mouth Daily.     • rosuvastatin (CRESTOR) 10 MG tablet Take 1 tablet by mouth Daily. 90 tablet 3   • spironolactone (ALDACTONE) 50 MG tablet TAKE 1 TABLET DAILY 90 tablet 3   • verapamil ER (VERELAN) 240 MG 24 hr capsule TAKE 1 CAPSULE DAILY 90 capsule 3     No facility-administered medications prior to visit.       Patient Active Problem List   Diagnosis   • Complicated migraine   • Depression   • Hyperlipidemia   • Hypertension   • Heme positive stool   • FH: colon cancer   • Dysphagia   • Closed fracture of right distal radius       Advance Care Planning:  ACP discussion was held with the patient during this visit. Patient has an advance directive in EMR which is still valid.     Identification of Risk  "Factors:  Risk factors include: Breast Cancer/Mammogram Screening  Diabetic Lab Screening   Immunizations Discussed/Encouraged (specific immunizations; Pneumococcal 23 and Shingrix )  Osteoporosis Risk.    Review of Systems    Compared to one year ago, the patient feels her physical health is the same.  Compared to one year ago, the patient feels her mental health is the same.    Objective     Physical Exam    Vitals:    06/09/21 0808   Pulse: 80   Temp: 98 °F (36.7 °C)   SpO2: 97%   Weight: 59 kg (130 lb)   Height: 160 cm (63\")       Patient's Body mass index is 23.03 kg/m². indicating that she is within normal range (BMI 18.5-24.9). No BMI management plan needed..      Assessment/Plan   Patient Self-Management and Personalized Health Advice  The patient has been provided with information about: exercise and preventive services including:   · Annual Wellness Visit (AWV).    Visit Diagnoses:    ICD-10-CM ICD-9-CM   1. Medicare annual wellness visit, subsequent  Z00.00 V70.0       No orders of the defined types were placed in this encounter.      Outpatient Encounter Medications as of 6/9/2021   Medication Sig Dispense Refill   • aspirin 81 MG tablet Take  by mouth.     • Biotin 10 MG tablet Take  by mouth.     • buPROPion XL (WELLBUTRIN XL) 300 MG 24 hr tablet TAKE 1 TABLET DAILY 90 tablet 3   • Cetirizine HCl 10 MG capsule Take  by mouth.     • D-Mannose 500 MG capsule Take 500 mg by mouth.     • dicyclomine (BENTYL) 10 MG capsule 1 q 6 h prn abdominal pain 120 capsule 11   • estradiol (ESTRACE) 0.1 MG/GM vaginal cream INSERT 1 MG D UTD     • lidocaine (LIDODERM) 5 % Place 1 patch on the skin as directed by provider Daily. Remove & Discard patch within 12 hours or as directed by MD 30 each 0   • LORazepam (ATIVAN) 1 MG tablet Take 1 tablet by mouth Every 8 (Eight) Hours As Needed for Anxiety. 15 tablet 0   • metaxalone (Skelaxin) 800 MG tablet Take 1 tablet by mouth 3 (Three) Times a Day As Needed for Muscle " Spasms. 90 tablet 1   • metoclopramide (REGLAN) 10 MG tablet Take 1 tablet by mouth 4 (Four) Times a Day Before Meals & at Bedtime. (Patient taking differently: Take 10 mg by mouth 4 (Four) Times a Day Before Meals & at Bedtime. Patient taking PRN) 30 tablet 0   • omeprazole (priLOSEC) 20 MG capsule Take 20 mg by mouth Daily.     • rosuvastatin (CRESTOR) 10 MG tablet Take 1 tablet by mouth Daily. 90 tablet 3   • spironolactone (ALDACTONE) 50 MG tablet TAKE 1 TABLET DAILY 90 tablet 3   • verapamil ER (VERELAN) 240 MG 24 hr capsule TAKE 1 CAPSULE DAILY 90 capsule 3     No facility-administered encounter medications on file as of 6/9/2021.       Reviewed use of high risk medication in the elderly: not applicable  Reviewed for potential of harmful drug interactions in the elderly: not applicable    Follow Up:  Sees GYN at [facility] for mammogram and dexa.       An After Visit Summary and PPPS with all of these plans were given to the patient.

## 2021-06-10 LAB
ALBUMIN SERPL-MCNC: 4.9 G/DL (ref 3.5–5.2)
ALBUMIN/GLOB SERPL: 2.2 G/DL
ALP SERPL-CCNC: 99 U/L (ref 39–117)
ALT SERPL-CCNC: 14 U/L (ref 1–33)
APPEARANCE UR: CLEAR
AST SERPL-CCNC: 16 U/L (ref 1–32)
BACTERIA #/AREA URNS HPF: ABNORMAL /HPF
BASOPHILS # BLD AUTO: 0.04 10*3/MM3 (ref 0–0.2)
BASOPHILS NFR BLD AUTO: 0.6 % (ref 0–1.5)
BILIRUB SERPL-MCNC: 0.3 MG/DL (ref 0–1.2)
BILIRUB UR QL STRIP: NEGATIVE
BUN SERPL-MCNC: 13 MG/DL (ref 8–23)
BUN/CREAT SERPL: 15.1 (ref 7–25)
CALCIUM SERPL-MCNC: 10.1 MG/DL (ref 8.6–10.5)
CASTS URNS MICRO: ABNORMAL
CHLORIDE SERPL-SCNC: 100 MMOL/L (ref 98–107)
CHOLEST SERPL-MCNC: 164 MG/DL (ref 0–200)
CHOLEST/HDLC SERPL: 2.69 {RATIO}
CO2 SERPL-SCNC: 27.7 MMOL/L (ref 22–29)
COLOR UR: YELLOW
CREAT SERPL-MCNC: 0.86 MG/DL (ref 0.57–1)
EOSINOPHIL # BLD AUTO: 0.06 10*3/MM3 (ref 0–0.4)
EOSINOPHIL NFR BLD AUTO: 0.9 % (ref 0.3–6.2)
EPI CELLS #/AREA URNS HPF: ABNORMAL /HPF
ERYTHROCYTE [DISTWIDTH] IN BLOOD BY AUTOMATED COUNT: 12.3 % (ref 12.3–15.4)
GLOBULIN SER CALC-MCNC: 2.2 GM/DL
GLUCOSE SERPL-MCNC: 90 MG/DL (ref 65–99)
GLUCOSE UR QL: NEGATIVE
HBA1C MFR BLD: 5.2 % (ref 4.8–5.6)
HCT VFR BLD AUTO: 46 % (ref 34–46.6)
HDLC SERPL-MCNC: 61 MG/DL (ref 40–60)
HGB BLD-MCNC: 15.1 G/DL (ref 12–15.9)
HGB UR QL STRIP: NEGATIVE
IMM GRANULOCYTES # BLD AUTO: 0.03 10*3/MM3 (ref 0–0.05)
IMM GRANULOCYTES NFR BLD AUTO: 0.5 % (ref 0–0.5)
KETONES UR QL STRIP: NEGATIVE
LDLC SERPL CALC-MCNC: 90 MG/DL (ref 0–100)
LEUKOCYTE ESTERASE UR QL STRIP: ABNORMAL
LYMPHOCYTES # BLD AUTO: 1.96 10*3/MM3 (ref 0.7–3.1)
LYMPHOCYTES NFR BLD AUTO: 29.4 % (ref 19.6–45.3)
MCH RBC QN AUTO: 28 PG (ref 26.6–33)
MCHC RBC AUTO-ENTMCNC: 32.8 G/DL (ref 31.5–35.7)
MCV RBC AUTO: 85.2 FL (ref 79–97)
MONOCYTES # BLD AUTO: 0.47 10*3/MM3 (ref 0.1–0.9)
MONOCYTES NFR BLD AUTO: 7.1 % (ref 5–12)
NEUTROPHILS # BLD AUTO: 4.1 10*3/MM3 (ref 1.7–7)
NEUTROPHILS NFR BLD AUTO: 61.5 % (ref 42.7–76)
NITRITE UR QL STRIP: POSITIVE
NRBC BLD AUTO-RTO: 0 /100 WBC (ref 0–0.2)
PH UR STRIP: 6 [PH] (ref 5–8)
PLATELET # BLD AUTO: 311 10*3/MM3 (ref 140–450)
POTASSIUM SERPL-SCNC: 5.1 MMOL/L (ref 3.5–5.2)
PROT SERPL-MCNC: 7.1 G/DL (ref 6–8.5)
PROT UR QL STRIP: NEGATIVE
RBC # BLD AUTO: 5.4 10*6/MM3 (ref 3.77–5.28)
RBC #/AREA URNS HPF: ABNORMAL /HPF
SODIUM SERPL-SCNC: 140 MMOL/L (ref 136–145)
SP GR UR: 1.01 (ref 1–1.03)
T4 FREE SERPL-MCNC: 1.08 NG/DL (ref 0.93–1.7)
TRIGL SERPL-MCNC: 69 MG/DL (ref 0–150)
TSH SERPL DL<=0.005 MIU/L-ACNC: 3.49 UIU/ML (ref 0.27–4.2)
UROBILINOGEN UR STRIP-MCNC: ABNORMAL MG/DL
VLDLC SERPL CALC-MCNC: 13 MG/DL (ref 5–40)
WBC # BLD AUTO: 6.66 10*3/MM3 (ref 3.4–10.8)
WBC #/AREA URNS HPF: ABNORMAL /HPF

## 2021-06-11 NOTE — PROGRESS NOTES
Called patient via phone and verbally relayed results and recommendations per Dr. Ortiz. All information was verbally understood by the patient.   Thank you  Nataly

## 2021-06-23 PROBLEM — K63.5 COLON POLYP: Status: ACTIVE | Noted: 2021-06-23

## 2021-07-09 ENCOUNTER — TELEPHONE (OUTPATIENT)
Dept: GASTROENTEROLOGY | Facility: CLINIC | Age: 71
End: 2021-07-09

## 2021-07-09 NOTE — TELEPHONE ENCOUNTER
Called pt and pt reports that in 2018 pt had double procedure done and Dr Villavicencio had mentioned to her have a repeat egd and c/s.  Pt is currently scheduled for c/s on 09/14 and is asking if she needs to have repeat egd. ADvised will send message to Dr Villavicencio.   Verb understanding.

## 2021-07-09 NOTE — TELEPHONE ENCOUNTER
Yes you can go ahead and schedule her to have an EGD also on the day of the colonoscopy to evaluate her dyspepsia. Thx. kjh

## 2021-07-09 NOTE — TELEPHONE ENCOUNTER
----- Message from Florencia Liang Rep sent at 7/9/2021 11:14 AM EDT -----  Regarding: FW: Procedure  Contact: [phone]  Please add EGD to PT order for colonoscopy   ----- Message -----  From: Sierra Barajas RegSched Rep  Sent: 7/9/2021   9:51 AM EDT  To: [facility]  Subject: Procedure                                        Pt calling regarding EGD to be schedule at same time with Colonoscopy on 09/14/2021.  Please contact pt regarding.  Thank You

## 2021-07-12 ENCOUNTER — PREP FOR SURGERY (OUTPATIENT)
Dept: OTHER | Facility: HOSPITAL | Age: 71
End: 2021-07-12

## 2021-07-12 NOTE — TELEPHONE ENCOUNTER
Case request modified to add egd to exisiting colonoscopy.     Called pt and advised of Dr Villavicencio's note. Verb understanding.     Message sent to Abby in scheduling regarding adding the egd to the c/s.

## 2021-07-13 ENCOUNTER — PREP FOR SURGERY (OUTPATIENT)
Dept: OTHER | Facility: HOSPITAL | Age: 71
End: 2021-07-13

## 2021-08-02 RX ORDER — ROSUVASTATIN CALCIUM 10 MG/1
TABLET, COATED ORAL
Qty: 90 TABLET | Refills: 3 | Status: SHIPPED | OUTPATIENT
Start: 2021-08-02 | End: 2022-06-10 | Stop reason: SDUPTHER

## 2021-08-23 ENCOUNTER — TELEPHONE (OUTPATIENT)
Dept: GASTROENTEROLOGY | Facility: CLINIC | Age: 71
End: 2021-08-23

## 2021-09-10 ENCOUNTER — TELEPHONE (OUTPATIENT)
Dept: GASTROENTEROLOGY | Facility: CLINIC | Age: 71
End: 2021-09-10

## 2021-10-19 ENCOUNTER — TELEPHONE (OUTPATIENT)
Dept: GASTROENTEROLOGY | Facility: CLINIC | Age: 71
End: 2021-10-19

## 2021-10-19 NOTE — TELEPHONE ENCOUNTER
Patient is wanting to cancel and reschedule procedure. Please give patient a call back at [phone].

## 2021-10-20 NOTE — TELEPHONE ENCOUNTER
Spoke with patient to reschedule EGD/CS. Moved to 01/04/2022 arriving at 7:00am. Revised prep packet mailed to verified address. Spoke with Tory--Kaykay

## 2021-12-12 DIAGNOSIS — I10 ESSENTIAL HYPERTENSION: ICD-10-CM

## 2021-12-14 RX ORDER — VERAPAMIL HYDROCHLORIDE 240 MG/1
CAPSULE, EXTENDED RELEASE ORAL
Qty: 90 CAPSULE | Refills: 3 | Status: SHIPPED | OUTPATIENT
Start: 2021-12-14 | End: 2022-06-10 | Stop reason: SDUPTHER

## 2021-12-22 ENCOUNTER — TRANSCRIBE ORDERS (OUTPATIENT)
Dept: ADMINISTRATIVE | Facility: HOSPITAL | Age: 71
End: 2021-12-22

## 2021-12-22 DIAGNOSIS — Z01.818 OTHER SPECIFIED PRE-OPERATIVE EXAMINATION: Primary | ICD-10-CM

## 2021-12-27 ENCOUNTER — TELEPHONE (OUTPATIENT)
Dept: GASTROENTEROLOGY | Facility: CLINIC | Age: 71
End: 2021-12-27

## 2021-12-27 DIAGNOSIS — Z01.818 PRE-OP TESTING: Primary | ICD-10-CM

## 2021-12-27 NOTE — TELEPHONE ENCOUNTER
----- Message from Marcella Lazo CMA sent at 12/27/2021  3:22 PM EST -----  Regarding: Covid orders  Please place Covid test orders.    Thank you

## 2022-01-03 ENCOUNTER — APPOINTMENT (OUTPATIENT)
Dept: LAB | Facility: HOSPITAL | Age: 72
End: 2022-01-03

## 2022-04-12 ENCOUNTER — APPOINTMENT (OUTPATIENT)
Dept: WOMENS IMAGING | Facility: HOSPITAL | Age: 72
End: 2022-04-12

## 2022-04-12 PROCEDURE — 77067 SCR MAMMO BI INCL CAD: CPT | Performed by: RADIOLOGY

## 2022-04-12 PROCEDURE — 77063 BREAST TOMOSYNTHESIS BI: CPT | Performed by: RADIOLOGY

## 2022-04-28 DIAGNOSIS — F33.0 MILD EPISODE OF RECURRENT MAJOR DEPRESSIVE DISORDER: ICD-10-CM

## 2022-04-28 DIAGNOSIS — I10 HYPERTENSION, UNSPECIFIED TYPE: ICD-10-CM

## 2022-04-28 RX ORDER — BUPROPION HYDROCHLORIDE 300 MG/1
TABLET ORAL
Qty: 90 TABLET | Refills: 3 | Status: SHIPPED | OUTPATIENT
Start: 2022-04-28 | End: 2022-09-13 | Stop reason: SDUPTHER

## 2022-04-28 RX ORDER — SPIRONOLACTONE 50 MG/1
TABLET, FILM COATED ORAL
Qty: 90 TABLET | Refills: 3 | Status: SHIPPED | OUTPATIENT
Start: 2022-04-28 | End: 2022-05-26 | Stop reason: SDUPTHER

## 2022-05-03 ENCOUNTER — OFFICE VISIT (OUTPATIENT)
Dept: SPORTS MEDICINE | Facility: CLINIC | Age: 72
End: 2022-05-03

## 2022-05-03 VITALS
RESPIRATION RATE: 16 BRPM | BODY MASS INDEX: 23.04 KG/M2 | SYSTOLIC BLOOD PRESSURE: 130 MMHG | OXYGEN SATURATION: 98 % | TEMPERATURE: 97.8 F | WEIGHT: 130 LBS | HEIGHT: 63 IN | HEART RATE: 73 BPM | DIASTOLIC BLOOD PRESSURE: 80 MMHG

## 2022-05-03 DIAGNOSIS — J01.00 ACUTE NON-RECURRENT MAXILLARY SINUSITIS: Primary | ICD-10-CM

## 2022-05-03 PROCEDURE — 99213 OFFICE O/P EST LOW 20 MIN: CPT | Performed by: FAMILY MEDICINE

## 2022-05-03 RX ORDER — AMOXICILLIN AND CLAVULANATE POTASSIUM 875; 125 MG/1; MG/1
1 TABLET, FILM COATED ORAL 2 TIMES DAILY
Qty: 20 TABLET | Refills: 0 | Status: SHIPPED | OUTPATIENT
Start: 2022-05-03 | End: 2022-05-05

## 2022-05-03 NOTE — PROGRESS NOTES
"Karena is a 72 y.o. year old female presents to [facility]    Chief Complaint   Patient presents with   • throat infection     Eval for new onset throat infection - reports having previous issues with blocked salivary gland, scratchy throat and swollen glands on the left side, swelling behind the ear - started 05/01/2022 - here for further evaluation and treatment        History of Present Illness  History of same.  She states that she was treated approximately 3 years ago by ENT, Dr. Ortiz as well with several rounds of antibiotics.  We will start at that time that she had a blocked salivary gland.  More acutely, she has noticed upper airway congestion, sinus pressure primarily left-sided since Saturday.  Symptoms are persistent.  She also notices postnasal drip though denies any respiratory/chest symptoms.  No fever.    I have reviewed the patient's medical, family, and social history in detail and updated the computerized patient record.    /80 (BP Location: Left arm, Patient Position: Sitting, Cuff Size: Adult)   Pulse 73   Temp 97.8 °F (36.6 °C) (Temporal)   Resp 16   Ht 160 cm (62.99\")   Wt 59 kg (130 lb)   SpO2 98%   BMI 23.03 kg/m²      Physical Exam    Mask worn thru encounter  Vital signs reviewed.   General: No acute distress.  Eyes: conjunctiva clear; pupils equally round and reactive  ENT: external ears atraumatic; TMs clear; left maxillary sinus tenderness present  Neck: Supple, no LAD  CV: no peripheral edema; S1, S2, no M/R/G  Resp: normal respiratory effort, no use of accessory muscles; CTA B/L  Skin: no rashes or wounds; normal turgor  Psych: mood and affect appropriate; recent and remote memory intact  Neuro: sensation to light touch intact               Diagnoses and all orders for this visit:    Acute non-recurrent maxillary sinusitis  -     amoxicillin-clavulanate (Augmentin) 875-125 MG per tablet; Take 1 tablet by mouth 2 (Two) Times a Day for 10 " days.          Follow Up   No follow-ups on file.  Patient was given instructions and counseling regarding her condition or for health maintenance advice. Please see specific information pulled into the AVS if appropriate.     EMR Dragon/Transcription disclaimer:    Much of this encounter note is an electronic transcription/translation of spoken language to printed text.  The electronic translation of spoken language may permit erroneous, or at times, nonsensical words or phrases to be inadvertently transcribed.  Although I have reviewed the note for such errors some may still exist.

## 2022-05-05 ENCOUNTER — TELEPHONE (OUTPATIENT)
Dept: SPORTS MEDICINE | Facility: CLINIC | Age: 72
End: 2022-05-05

## 2022-05-05 RX ORDER — AZITHROMYCIN 250 MG/1
TABLET, FILM COATED ORAL
Qty: 6 TABLET | Refills: 0 | Status: SHIPPED | OUTPATIENT
Start: 2022-05-05 | End: 2022-05-26

## 2022-05-05 NOTE — TELEPHONE ENCOUNTER
Patient seen in office with us this week, reports augmentin medication we gave her is making her very nauseous, wants to know if you can change the medication.     Thanks  Nataly

## 2022-05-26 ENCOUNTER — OFFICE VISIT (OUTPATIENT)
Dept: FAMILY MEDICINE CLINIC | Facility: CLINIC | Age: 72
End: 2022-05-26

## 2022-05-26 VITALS
SYSTOLIC BLOOD PRESSURE: 149 MMHG | BODY MASS INDEX: 24.36 KG/M2 | WEIGHT: 137.5 LBS | HEIGHT: 63 IN | OXYGEN SATURATION: 98 % | HEART RATE: 70 BPM | DIASTOLIC BLOOD PRESSURE: 75 MMHG | TEMPERATURE: 96 F

## 2022-05-26 DIAGNOSIS — F33.1 MODERATE EPISODE OF RECURRENT MAJOR DEPRESSIVE DISORDER: ICD-10-CM

## 2022-05-26 DIAGNOSIS — F41.9 ANXIETY: ICD-10-CM

## 2022-05-26 DIAGNOSIS — E78.5 HYPERLIPIDEMIA, UNSPECIFIED HYPERLIPIDEMIA TYPE: Primary | ICD-10-CM

## 2022-05-26 DIAGNOSIS — I10 HYPERTENSION, UNSPECIFIED TYPE: ICD-10-CM

## 2022-05-26 PROCEDURE — 99214 OFFICE O/P EST MOD 30 MIN: CPT | Performed by: FAMILY MEDICINE

## 2022-05-26 RX ORDER — SPIRONOLACTONE 50 MG/1
50 TABLET, FILM COATED ORAL DAILY
Qty: 90 TABLET | Refills: 3 | Status: SHIPPED | OUTPATIENT
Start: 2022-05-26 | End: 2022-06-02 | Stop reason: SDUPTHER

## 2022-05-26 RX ORDER — METOCLOPRAMIDE 10 MG/1
10 TABLET ORAL
COMMUNITY

## 2022-05-26 NOTE — PROGRESS NOTES
"Chief Complaint  Establish Care, Hyperlipidemia, and Hypertension    Subjective          Karena Perez presents to [facility]  History of Present Illness to establish care and follow-up on hyperlipidemia and hypertension  Patient denies any headache, blurring of vision, lightheadedness or dizziness.  She is  checking her blood pressure at home.  And her blood pressure running between 1 40-1 50 /70-80  Patient has long history of hyperlipidemia denies any body ache, nausea vomiting.  Denies any problem with medication.  Patient also has a history of depression and anxiety.  Her symptoms are stable on current doses of Wellbutrin .  Denies any SI or HI    Objective   Vital Signs:  /75   Pulse 70   Temp 96 °F (35.6 °C)   Ht 160 cm (63\")   Wt 62.4 kg (137 lb 8 oz)   SpO2 98%   BMI 24.36 kg/m²   BMI is within normal parameters. No other follow-up for BMI required.      Physical Exam   Result Review :     Common labs    Common Labsle 6/9/21 6/9/21 6/9/21 6/9/21    0900 0900 0900 0900   Glucose   90    BUN   13    Creatinine   0.86    eGFR Non  Am   65    eGFR African Am   78    Sodium   140    Potassium   5.1    Chloride   100    Calcium   10.1    Total Protein   7.1    Albumin   4.90    Total Bilirubin   0.3    Alkaline Phosphatase   99    AST (SGOT)   16    ALT (SGPT)   14    WBC  6.66     Hemoglobin  15.1     Hematocrit  46.0     Platelets  311     Total Cholesterol    164   Triglycerides    69   HDL Cholesterol    61 (A)   LDL Cholesterol     90   Hemoglobin A1C 5.20      (A) Abnormal value       Comments are available for some flowsheets but are not being displayed.                     Assessment and Plan    Diagnoses and all orders for this visit:    1. Hyperlipidemia, unspecified hyperlipidemia type (Primary)    2. Hypertension, unspecified type  -     spironolactone (ALDACTONE) 50 MG tablet; Take 1 tablet by mouth Daily. 1and 1/2 pills po qd  Dispense: 90 tablet; " Refill: 3    3. Moderate episode of recurrent major depressive disorder (HCC)    4. Anxiety      Karena Perez is a 72-year-old female patient came here to establish care and for follow-up on  Hyperlipidemia, LDL is still at goal, continue 10 mg of Crestor.  Hypertension, blood pressures slightly elevated today.  She is on verapamil 240 mg and spironolactone.  She is giving also history of elevated blood pressure at home.  I will increase the spironolactone to 75 mg a day.  Depression and anxiety stable on current doses continue same           Follow Up   Return in about 6 weeks (around 7/7/2022), or if symptoms worsen or fail to improve.  Patient was given instructions and counseling regarding her condition or for health maintenance advice. Please see specific information pulled into the AVS if appropriate.

## 2022-06-01 ENCOUNTER — TELEPHONE (OUTPATIENT)
Dept: FAMILY MEDICINE CLINIC | Facility: CLINIC | Age: 72
End: 2022-06-01

## 2022-06-01 DIAGNOSIS — I10 HYPERTENSION, UNSPECIFIED TYPE: ICD-10-CM

## 2022-06-01 NOTE — TELEPHONE ENCOUNTER
Pharmacy Name: [facility] - [place], [place] - [address] [facility] - [phone] [facility] [phone]      Pharmacy representative name: ROSETTE    Pharmacy representative phone number: [phone]    What medication are you calling in regards to: spironolactone (ALDACTONE) 50 MG tablet    What question does the pharmacy have: REQUEST CLARIFICATION ON DIRECTIONS. THEY CURRENTLY STATE TO TAKE 1 TABLET DAILY AND 1.5 TABLETS DAILY. NEED TO KNOW WHAT IS THE CORRECT INSTRUCTION    Who is the provider that prescribed the medication: DR SMITH    Additional notes: REFERENCE NUMBER: [id number]

## 2022-06-02 RX ORDER — SPIRONOLACTONE 50 MG/1
75 TABLET, FILM COATED ORAL DAILY
Qty: 135 TABLET | Refills: 0 | Status: SHIPPED | OUTPATIENT
Start: 2022-06-02 | End: 2022-06-30 | Stop reason: SDUPTHER

## 2022-06-10 DIAGNOSIS — I10 ESSENTIAL HYPERTENSION: ICD-10-CM

## 2022-06-10 RX ORDER — VERAPAMIL HYDROCHLORIDE 240 MG/1
240 CAPSULE, EXTENDED RELEASE ORAL DAILY
Qty: 60 CAPSULE | Refills: 0 | Status: SHIPPED | OUTPATIENT
Start: 2022-06-10 | End: 2022-06-30 | Stop reason: SDUPTHER

## 2022-06-10 RX ORDER — ROSUVASTATIN CALCIUM 10 MG/1
10 TABLET, COATED ORAL DAILY
Qty: 60 TABLET | Refills: 0 | Status: SHIPPED | OUTPATIENT
Start: 2022-06-10 | End: 2022-06-30 | Stop reason: SDUPTHER

## 2022-06-10 NOTE — TELEPHONE ENCOUNTER
Caller: Karena Perez    Relationship: Self    Best call back number: [phone]    Requested Prescriptions:   Requested Prescriptions     Pending Prescriptions Disp Refills   • verapamil ER (VERELAN) 240 MG 24 hr capsule 90 capsule 3     Sig: Take 1 capsule by mouth Daily.   • rosuvastatin (CRESTOR) 10 MG tablet 90 tablet 3     Sig: Take 1 tablet by mouth Daily.        Pharmacy where request should be sent: [facility] - [address] - [phone] [facility] [phone] FX     Additional details provided by patient: PATIENT STATES SHE WAS ADVISED BY DR MILTON SMITH TO CALL IN WHEN SHE WAS DUE FOR REFILLS IN ORDER TO GET NEW PRESCRIPTIONS WITH DR SMITH'S NAME ON THEM    Does the patient have less than a 3 day supply:  [] Yes  [x] No    Florencia Branch Rep   06/10/22 13:42 EDT

## 2022-06-10 NOTE — TELEPHONE ENCOUNTER
Rx Refill Note  Requested Prescriptions     Pending Prescriptions Disp Refills   • verapamil ER (VERELAN) 240 MG 24 hr capsule 90 capsule 3     Sig: Take 1 capsule by mouth Daily.   • rosuvastatin (CRESTOR) 10 MG tablet 90 tablet 3     Sig: Take 1 tablet by mouth Daily.      Last office visit with prescribing clinician: 5/26/2022      Next office visit with prescribing clinician: 6/30/2022       {TIP  Please add Last Relevant Lab Date if appropriate: 6/9/2021    AMIRAH Vaz  06/10/22, 14:11 EDT

## 2022-06-16 DIAGNOSIS — I10 ESSENTIAL HYPERTENSION: Primary | ICD-10-CM

## 2022-06-16 DIAGNOSIS — E78.5 HYPERLIPIDEMIA, UNSPECIFIED HYPERLIPIDEMIA TYPE: ICD-10-CM

## 2022-06-16 DIAGNOSIS — R53.83 FATIGUE, UNSPECIFIED TYPE: ICD-10-CM

## 2022-06-24 PROBLEM — N81.9 PROLAPSE OF FEMALE GENITAL ORGANS: Status: ACTIVE | Noted: 2018-08-03

## 2022-06-24 PROBLEM — K58.9 IRRITABLE BOWEL SYNDROME: Status: ACTIVE | Noted: 2018-08-03

## 2022-06-24 PROBLEM — K21.9 GASTROESOPHAGEAL REFLUX DISEASE: Status: ACTIVE | Noted: 2018-08-03

## 2022-06-24 PROBLEM — R31.29 MICROSCOPIC HEMATURIA: Status: ACTIVE | Noted: 2018-08-03

## 2022-06-24 PROBLEM — H35.363 DRUSEN (DEGENERATIVE) OF MACULA, BILATERAL: Status: ACTIVE | Noted: 2021-06-16

## 2022-06-24 PROBLEM — H43.813 VITREOUS DEGENERATION OF BOTH EYES: Status: ACTIVE | Noted: 2021-06-16

## 2022-06-24 PROBLEM — J30.2 SEASONAL ALLERGIC RHINITIS: Status: ACTIVE | Noted: 2018-08-03

## 2022-06-24 PROBLEM — M19.90 ARTHRITIS: Status: ACTIVE | Noted: 2018-08-03

## 2022-06-24 PROBLEM — H25.9 AGE-RELATED CATARACT: Status: ACTIVE | Noted: 2021-06-16

## 2022-06-30 ENCOUNTER — OFFICE VISIT (OUTPATIENT)
Dept: FAMILY MEDICINE CLINIC | Facility: CLINIC | Age: 72
End: 2022-06-30

## 2022-06-30 VITALS
WEIGHT: 125.4 LBS | TEMPERATURE: 95.7 F | HEART RATE: 72 BPM | BODY MASS INDEX: 22.22 KG/M2 | HEIGHT: 63 IN | DIASTOLIC BLOOD PRESSURE: 70 MMHG | SYSTOLIC BLOOD PRESSURE: 112 MMHG | OXYGEN SATURATION: 98 %

## 2022-06-30 DIAGNOSIS — I10 ESSENTIAL HYPERTENSION: ICD-10-CM

## 2022-06-30 DIAGNOSIS — I10 HYPERTENSION, UNSPECIFIED TYPE: ICD-10-CM

## 2022-06-30 DIAGNOSIS — Z00.00 MEDICARE ANNUAL WELLNESS VISIT, SUBSEQUENT: Primary | ICD-10-CM

## 2022-06-30 PROCEDURE — 1170F FXNL STATUS ASSESSED: CPT | Performed by: FAMILY MEDICINE

## 2022-06-30 PROCEDURE — G0439 PPPS, SUBSEQ VISIT: HCPCS | Performed by: FAMILY MEDICINE

## 2022-06-30 PROCEDURE — 1160F RVW MEDS BY RX/DR IN RCRD: CPT | Performed by: FAMILY MEDICINE

## 2022-06-30 RX ORDER — ROSUVASTATIN CALCIUM 10 MG/1
10 TABLET, COATED ORAL DAILY
Qty: 90 TABLET | Refills: 1 | Status: SHIPPED | OUTPATIENT
Start: 2022-06-30 | End: 2023-01-19

## 2022-06-30 RX ORDER — SPIRONOLACTONE 50 MG/1
75 TABLET, FILM COATED ORAL DAILY
Qty: 135 TABLET | Refills: 0 | Status: SHIPPED | OUTPATIENT
Start: 2022-06-30 | End: 2023-01-19

## 2022-06-30 RX ORDER — VERAPAMIL HYDROCHLORIDE 240 MG/1
240 CAPSULE, EXTENDED RELEASE ORAL DAILY
Qty: 90 CAPSULE | Refills: 1 | Status: SHIPPED | OUTPATIENT
Start: 2022-06-30 | End: 2023-03-20 | Stop reason: SDUPTHER

## 2022-08-31 ENCOUNTER — TELEPHONE (OUTPATIENT)
Dept: FAMILY MEDICINE CLINIC | Facility: CLINIC | Age: 72
End: 2022-08-31

## 2022-09-01 NOTE — TELEPHONE ENCOUNTER
CALLED PATIENT BACK, GAVE INSTRUCTION AND SCHEDULED APPOINTMENT. PT VOICED UNDERSTANDING OF INSTRUCTION AND WHAT BP CUFF TO ORDER.

## 2022-09-01 NOTE — TELEPHONE ENCOUNTER
Called patient in regards to Doctor Ann message, no answer LVM for patient to give our office a call back.

## 2022-09-01 NOTE — TELEPHONE ENCOUNTER
Caller: Chris Karena    Relationship: Self    Best call back number:[phone]    What is the best time to reach you: ANY    Who are you requesting to speak with (clinical staff, provider,  specific staff member): DR. SMITH OR PHANI      What was the call regarding: PATIENT WAS RETURNING A CALL FROM [facility]. HUB TRIED TO TRANSFER TO OFFICE BUT WERE UNABLE TO GET AN ANSWER. PATIENT REQUESTS A CALL BACK TO DISCUSS FURTHER INSTRUCTIONS REGARDING BLOOD PRESSURE CONCERNS.     Do you require a callback: YES

## 2022-09-13 DIAGNOSIS — F33.0 MILD EPISODE OF RECURRENT MAJOR DEPRESSIVE DISORDER: ICD-10-CM

## 2022-09-13 NOTE — TELEPHONE ENCOUNTER
Rx Refill Note  Requested Prescriptions     Pending Prescriptions Disp Refills   • buPROPion XL (WELLBUTRIN XL) 300 MG 24 hr tablet 90 tablet 1     Sig: Take 1 tablet by mouth Daily.      Last office visit with prescribing clinician: 6/30/2022      Next office visit with prescribing clinician: 10/7/2022            Lisa Baird MA  09/13/22, 16:47 EDT

## 2022-09-14 RX ORDER — BUPROPION HYDROCHLORIDE 300 MG/1
300 TABLET ORAL DAILY
Qty: 90 TABLET | Refills: 1 | Status: SHIPPED | OUTPATIENT
Start: 2022-09-14

## 2022-09-19 RX ORDER — ROSUVASTATIN CALCIUM 10 MG/1
TABLET, COATED ORAL
Qty: 90 TABLET | Refills: 3 | OUTPATIENT
Start: 2022-09-19

## 2022-09-30 ENCOUNTER — TELEPHONE (OUTPATIENT)
Dept: GASTROENTEROLOGY | Facility: CLINIC | Age: 72
End: 2022-09-30

## 2022-09-30 NOTE — TELEPHONE ENCOUNTER
----- Message from Karena Perez sent at 2022 11:04 AM EDT -----  Regarding: Need to sydnee Endo/colonoscopy   I need to schedule an Endo/Colonoscopy with Dr Villavicencio.  I had both on 18 and needed a recheck in 3 years, but due to my fear of Covid it has been 4.    Please have your  call me on my cell  [phone].    My  1950  [address]  [id number]

## 2022-10-01 ENCOUNTER — PREP FOR SURGERY (OUTPATIENT)
Dept: OTHER | Facility: HOSPITAL | Age: 72
End: 2022-10-01

## 2022-10-01 DIAGNOSIS — K22.70 BARRETT ESOPHAGUS: Primary | ICD-10-CM

## 2022-10-01 DIAGNOSIS — K63.5 COLON POLYP: ICD-10-CM

## 2022-10-31 ENCOUNTER — OFFICE VISIT (OUTPATIENT)
Dept: FAMILY MEDICINE CLINIC | Facility: CLINIC | Age: 72
End: 2022-10-31

## 2022-10-31 ENCOUNTER — HOSPITAL ENCOUNTER (OUTPATIENT)
Dept: GENERAL RADIOLOGY | Facility: HOSPITAL | Age: 72
Discharge: HOME | End: 2022-10-31
Admitting: FAMILY MEDICINE

## 2022-10-31 VITALS
HEART RATE: 65 BPM | OXYGEN SATURATION: 98 % | BODY MASS INDEX: 21.75 KG/M2 | DIASTOLIC BLOOD PRESSURE: 71 MMHG | WEIGHT: 127.4 LBS | HEIGHT: 64 IN | TEMPERATURE: 97.3 F | SYSTOLIC BLOOD PRESSURE: 154 MMHG

## 2022-10-31 DIAGNOSIS — R10.9 RIGHT FLANK PAIN: Primary | ICD-10-CM

## 2022-10-31 DIAGNOSIS — R31.29 OTHER MICROSCOPIC HEMATURIA: ICD-10-CM

## 2022-10-31 LAB
BILIRUB BLD-MCNC: NEGATIVE MG/DL
CLARITY, POC: CLEAR
COLOR UR: ABNORMAL
EXPIRATION DATE: ABNORMAL
GLUCOSE UR STRIP-MCNC: NEGATIVE MG/DL
KETONES UR QL: NEGATIVE
LEUKOCYTE EST, POC: NEGATIVE
Lab: ABNORMAL
NITRITE UR-MCNC: NEGATIVE MG/ML
PH UR: 5 [PH] (ref 5–8)
PROT UR STRIP-MCNC: NEGATIVE MG/DL
RBC # UR STRIP: ABNORMAL /UL
SP GR UR: 1 (ref 1–1.03)
UROBILINOGEN UR QL: NORMAL

## 2022-10-31 PROCEDURE — 99214 OFFICE O/P EST MOD 30 MIN: CPT | Performed by: FAMILY MEDICINE

## 2022-10-31 PROCEDURE — 81003 URINALYSIS AUTO W/O SCOPE: CPT | Performed by: FAMILY MEDICINE

## 2022-10-31 PROCEDURE — 74018 RADEX ABDOMEN 1 VIEW: CPT

## 2022-10-31 RX ORDER — ESTRADIOL 0.1 MG/G
0 CREAM VAGINAL DAILY
COMMUNITY
Start: 2022-10-19

## 2022-11-01 LAB
APPEARANCE UR: CLEAR
BACTERIA #/AREA URNS HPF: NORMAL /HPF
BILIRUB UR QL STRIP: NEGATIVE
CASTS URNS MICRO: NORMAL
COLOR UR: YELLOW
EPI CELLS #/AREA URNS HPF: NORMAL /HPF
GLUCOSE UR QL STRIP: NEGATIVE
HGB UR QL STRIP: NEGATIVE
KETONES UR QL STRIP: NEGATIVE
LEUKOCYTE ESTERASE UR QL STRIP: NEGATIVE
NITRITE UR QL STRIP: NEGATIVE
PH UR STRIP: 6 [PH] (ref 5–8)
PROT UR QL STRIP: NEGATIVE
RBC #/AREA URNS HPF: NORMAL /HPF
SP GR UR STRIP: 1.01 (ref 1–1.03)
UROBILINOGEN UR STRIP-MCNC: NORMAL MG/DL
WBC #/AREA URNS HPF: NORMAL /HPF

## 2022-11-02 LAB
BACTERIA UR CULT: NO GROWTH
BACTERIA UR CULT: NORMAL

## 2022-11-10 ENCOUNTER — TELEPHONE (OUTPATIENT)
Dept: FAMILY MEDICINE CLINIC | Facility: CLINIC | Age: 72
End: 2022-11-10

## 2022-11-10 NOTE — TELEPHONE ENCOUNTER
Called and spoke to patient. She says she is feeling ok and wants to wait till December to follow up.

## 2022-11-10 NOTE — TELEPHONE ENCOUNTER
DELETE AFTER REVIEWING: Telephone encounter to be sent to the clinical pool     Caller: Karena Perez    Relationship to patient: Self    Best call back number: [phone]    Chief complaint: 1 WEEK FOLLOW UP     Type of visit: MY CHART      Requested date: BEFORE NEXT AVAILABLE 12/22    If rescheduling, when is the original appointment: 11/10/22 DUE TO PROVIDER OUT OF THE OFFICE     Additional notes:WOULD LIKE TO KNOW IF IT IS OK TO WAIT UNTIL 12/22 FOR THE NEXT AVAILABLE DUE TO DR SMITH STATING SHE SHOULD BE SEEN AROUND 7 DAYS.

## 2022-11-29 ENCOUNTER — TELEPHONE (OUTPATIENT)
Dept: GASTROENTEROLOGY | Facility: CLINIC | Age: 72
End: 2022-11-29

## 2022-11-29 NOTE — TELEPHONE ENCOUNTER
Caller: Karena Perez    Relationship to patient: Self    Best call back number: [phone]      Type of visit: COLONOSCOPY    Requested date: FIRST AVAILABLE IN THE MORNING IF POSSIBLE    Additional notes: PATIENT HAS BEEN TRYING TO SCHEDULE PROCEDURE SINCE SEPT/OCT.   PATIENT STATED SHE TAKES REGLAN FOR ACID REFLUX/SPASMS (AS NEEDED). ONCE APPT SCHEDULED, WOULD SHE BE OKAY TO TAKE MEDICATION (IF NECESSARY) DAY OF APPOINTMENT. REGLAN NOT TAKEN ON REGULAR BASIS.   SHE DOES TAKE OMEPRAZOLE DAILY

## 2022-11-30 ENCOUNTER — TELEPHONE (OUTPATIENT)
Dept: GASTROENTEROLOGY | Facility: CLINIC | Age: 72
End: 2022-11-30

## 2022-11-30 PROBLEM — K22.70 BARRETT ESOPHAGUS: Status: ACTIVE | Noted: 2022-11-30

## 2022-11-30 NOTE — TELEPHONE ENCOUNTER
SHAHEEN patient via telephone for. Scheduled 03/21/2023 with arrival time of 10am . Prep paperwork mailed to verified address on file. Patient advised arrival time may change based on [facility] guidelines. SHAHEEN YING

## 2022-12-22 ENCOUNTER — OFFICE VISIT (OUTPATIENT)
Dept: FAMILY MEDICINE CLINIC | Facility: CLINIC | Age: 72
End: 2022-12-22

## 2022-12-22 VITALS
BODY MASS INDEX: 21.51 KG/M2 | HEART RATE: 74 BPM | RESPIRATION RATE: 16 BRPM | SYSTOLIC BLOOD PRESSURE: 150 MMHG | WEIGHT: 126 LBS | HEIGHT: 64 IN | OXYGEN SATURATION: 96 % | TEMPERATURE: 96.8 F | DIASTOLIC BLOOD PRESSURE: 79 MMHG

## 2022-12-22 DIAGNOSIS — R42 MIGRAINOUS DIZZINESS: ICD-10-CM

## 2022-12-22 DIAGNOSIS — K59.00 CONSTIPATION, UNSPECIFIED CONSTIPATION TYPE: Primary | ICD-10-CM

## 2022-12-22 DIAGNOSIS — G43.109 COMPLICATED MIGRAINE: ICD-10-CM

## 2022-12-22 DIAGNOSIS — R10.9 RIGHT FLANK PAIN: ICD-10-CM

## 2022-12-22 DIAGNOSIS — Z12.9 SCREENING FOR CANCER: ICD-10-CM

## 2022-12-22 PROCEDURE — 99214 OFFICE O/P EST MOD 30 MIN: CPT | Performed by: FAMILY MEDICINE

## 2022-12-22 NOTE — PROGRESS NOTES
"Chief Complaint  Hyperlipidemia, right flank pain (Follow up from last visit ), Headache, and Constipation    Subjective        Karena Perez presents to [facility]     History of Present Illness     The patient has consented to the use of CLAU.     The patient presents to the clinic for follow-up on right flank pain,  migraine headache and constipation.      The patient had and x-ray of the abdomen KUB on 10/31/2022 due to right flank pain. Results showed mild to moderate colonic fecal retention with a 2mm calcification projecting at the level of the kidney which may be a material in overlying bowel or potentially nephrolithiasis. She denies any right flank pain during this visit.     The patient has chronic constipation and has taken MiraLax 2 times a day for 1 week, providing relief. She stopped the medication and started taking probiotics instead which provided relief of her symptoms. She also drinks prune juice daily.     The patient has a history of migraines. Her migraines occur occasionally but she reports having an aura often. She has been experiencing dizziness with vision disturbance, and felt like her \"eyes were crossed\", lasting for a few seconds. Her last eye exam was on 05/2021. She has not seen a neurologist for her migraine headaches. Other symptoms associated with her migraine include seeing \"flashing lights\", spot in front of her vision, or cloudy or blurry vision.      Patient states that she monitors her blood pressure at home and is usually normal.     The patient complains of soreness around her ribs. She saw Dr. Ortiz in the past where an x-ray was done showing calcifications around the ribs.     She is also concerned about her risk of developing cancer due to her family history. Her sister has a history of stage 3 ovarian cancer. Her daughter and 1 of her cousins also developed ovarian cancer. She has a family history of colon cancer in her father and paternal " "grandmother. She also has a family history of breast cancer in her maternal side. The patient states that she has an appointment with her gynecologist on 04/2032 for her yearly exam and she will be scheduled for a DEXA scan and screening mammogram.     Objective   Vital Signs:  /79 (BP Location: Right arm, Patient Position: Sitting, Cuff Size: Adult)   Pulse 74   Temp 96.8 °F (36 °C) (Temporal)   Resp 16   Ht 161.3 cm (63.5\")   Wt 57.2 kg (126 lb)   SpO2 96%   BMI 21.97 kg/m²   Estimated body mass index is 21.97 kg/m² as calculated from the following:    Height as of this encounter: 161.3 cm (63.5\").    Weight as of this encounter: 57.2 kg (126 lb).    BMI is within normal parameters. No other follow-up for BMI required.      Physical Exam  Constitutional:       Appearance: Normal appearance. She is well-developed.   HENT:      Head: Normocephalic and atraumatic.      Right Ear: Tympanic membrane, ear canal and external ear normal.      Left Ear: Tympanic membrane, ear canal and external ear normal.      Nose: Nose normal.      Mouth/Throat:      Mouth: Mucous membranes are moist.   Eyes:      General: No scleral icterus.     Extraocular Movements: Extraocular movements intact.      Conjunctiva/sclera: Conjunctivae normal.      Pupils: Pupils are equal, round, and reactive to light.   Neck:      Thyroid: No thyromegaly.   Cardiovascular:      Rate and Rhythm: Normal rate and regular rhythm.      Pulses: Normal pulses.      Heart sounds: Normal heart sounds.   Pulmonary:      Effort: Pulmonary effort is normal. No respiratory distress.      Breath sounds: Normal breath sounds. No wheezing or rales.   Abdominal:      General: Bowel sounds are normal. There is no distension.      Palpations: Abdomen is soft. There is no mass.      Tenderness: There is no abdominal tenderness.      Hernia: No hernia is present.   Musculoskeletal:         General: No swelling or tenderness. Normal range of motion.      " Cervical back: Normal range of motion and neck supple.   Lymphadenopathy:      Cervical: No cervical adenopathy.   Skin:     General: Skin is warm.   Neurological:      General: No focal deficit present.      Mental Status: She is alert and oriented to person, place, and time.      Cranial Nerves: No cranial nerve deficit.      Sensory: No sensory deficit.      Deep Tendon Reflexes: Reflexes normal.   Psychiatric:         Mood and Affect: Mood normal.         Behavior: Behavior normal.         Thought Content: Thought content normal.         Judgment: Judgment normal.          Result Review :                    Assessment and Plan   Diagnoses and all orders for this visit:    1. Constipation, unspecified constipation type (Primary)    2. Right flank pain    3. Screening for cancer    4. Complicated migraine  -     Ambulatory Referral to Neurology    5. Migrainous dizziness  -     Ambulatory Referral to Neurology      1. Constipation and right flank pain  -Her symptoms of right flank pain has improved.  X-ray KUB results again discussed with her.  The patient has some concern about her right kidney stone reassurance given to patient if she gets symptomatic again we will.  Consider doing CT scan.    2. Screening for cancer.  -The patient is at high risk for breast cancer and ovarian cancer due to her family history. Her daughter and sister both had a history of ovarian cancer. She also has nieces, sister, and cousin with breast cancer. The patient would like to know her cancer risk. She is scheduled for a Pap smear, pelvic exam, mammogram, and DEXA scan with her OB-GYN. Discussed her risk and screening for ovarian cancer. She will also talk to her OB-GYN and let me know about it.     3. Complicated migraine.  -She has a history of complicated migraine headache with vision distortion and dizziness lasting for a few seconds. The patient will be referred to neurology for further evaluation and management.     4.  Migrainous dizziness.  -The patient will be referred to neurology for further evaluation and management.            Follow Up   No follow-ups on file.    Patient was given instructions and counseling regarding her condition or for health maintenance advice. Please see specific information pulled into the AVS if appropriate.       .DAXSCRIBEANDPROVIDERSTATEMENT    Transcribed from ambient dictation for Mariely Ann MD by Macario Ruiz.  12/22/22   12:59 EST    Patient or patient representative verbalized consent to the visit recording.  I have personally performed the services described in this document as transcribed by the above individual, and it is both accurate and complete.

## 2023-01-18 DIAGNOSIS — I10 HYPERTENSION, UNSPECIFIED TYPE: ICD-10-CM

## 2023-01-18 NOTE — TELEPHONE ENCOUNTER
Rx Refill Note  Requested Prescriptions     Pending Prescriptions Disp Refills   • spironolactone (ALDACTONE) 50 MG tablet [Pharmacy Med Name: SPIRONOLACT  TAB 50MG] 135 tablet 0     Sig: TAKE 1 AND 1/2 TABLETS     DAILY   • rosuvastatin (CRESTOR) 10 MG tablet [Pharmacy Med Name: ROSUVASTATIN TAB 10MG] 90 tablet 1     Sig: TAKE 1 TABLET DAILY      Last office visit with prescribing clinician: 12/22/2022   Last telemedicine visit with prescribing clinician: 3/28/2023   Next office visit with prescribing clinician: 3/28/2023       {TIP  Please add Last Relevant Lab Date if appropriate: 6/24/22                 Would you like a call back once the refill request has been completed: [] Yes [] No    If the office needs to give you a call back, can they leave a voicemail: [] Yes [] No    Lori Dueñas MA  01/18/23, 16:24 EST

## 2023-01-19 RX ORDER — ROSUVASTATIN CALCIUM 10 MG/1
TABLET, COATED ORAL
Qty: 90 TABLET | Refills: 1 | Status: SHIPPED | OUTPATIENT
Start: 2023-01-19

## 2023-01-19 RX ORDER — SPIRONOLACTONE 50 MG/1
TABLET, FILM COATED ORAL
Qty: 135 TABLET | Refills: 0 | Status: SHIPPED | OUTPATIENT
Start: 2023-01-19

## 2023-01-29 DIAGNOSIS — I10 ESSENTIAL HYPERTENSION: ICD-10-CM

## 2023-01-30 RX ORDER — VERAPAMIL HYDROCHLORIDE 240 MG/1
CAPSULE, EXTENDED RELEASE ORAL
Qty: 90 CAPSULE | Refills: 3 | OUTPATIENT
Start: 2023-01-30

## 2023-02-21 ENCOUNTER — TELEPHONE (OUTPATIENT)
Dept: GASTROENTEROLOGY | Facility: CLINIC | Age: 73
End: 2023-02-21

## 2023-02-21 NOTE — TELEPHONE ENCOUNTER
Caller: Karena Perez    Relationship to patient: Self    Best call back number: [phone]    Chief complaint: PATIENT STATED THAT SHE IS NEEDING TO RESCHEDULE HER COLONOSCOPY THAT'S SCHEDULED ON 03/21/23. SHE WOULD LIKE FOR A  TO CALL HER BACK TODAY. UNABLE TO WARM TRANSFER    Requested date: AFTER 03/21/23    If rescheduling, when is the original appointment: 03/21/23

## 2023-03-02 ENCOUNTER — ANESTHESIA EVENT (OUTPATIENT)
Dept: GASTROENTEROLOGY | Facility: HOSPITAL | Age: 73
End: 2023-03-02
Payer: MEDICARE

## 2023-03-03 ENCOUNTER — HOSPITAL ENCOUNTER (OUTPATIENT)
Facility: HOSPITAL | Age: 73
Setting detail: HOSPITAL OUTPATIENT SURGERY
Discharge: HOME | End: 2023-03-03
Attending: INTERNAL MEDICINE | Admitting: INTERNAL MEDICINE
Payer: MEDICARE

## 2023-03-03 ENCOUNTER — ANESTHESIA (OUTPATIENT)
Dept: GASTROENTEROLOGY | Facility: HOSPITAL | Age: 73
End: 2023-03-03
Payer: MEDICARE

## 2023-03-03 VITALS
DIASTOLIC BLOOD PRESSURE: 84 MMHG | WEIGHT: 125 LBS | RESPIRATION RATE: 15 BRPM | HEIGHT: 64 IN | BODY MASS INDEX: 21.34 KG/M2 | HEART RATE: 89 BPM | SYSTOLIC BLOOD PRESSURE: 138 MMHG | OXYGEN SATURATION: 98 %

## 2023-03-03 DIAGNOSIS — K63.5 COLON POLYP: ICD-10-CM

## 2023-03-03 DIAGNOSIS — K22.70 BARRETT ESOPHAGUS: ICD-10-CM

## 2023-03-03 PROCEDURE — 25010000002 PROPOFOL 10 MG/ML EMULSION: Performed by: ANESTHESIOLOGY

## 2023-03-03 PROCEDURE — 0 LIDOCAINE 1 % SOLUTION: Performed by: ANESTHESIOLOGY

## 2023-03-03 PROCEDURE — 88305 TISSUE EXAM BY PATHOLOGIST: CPT | Performed by: INTERNAL MEDICINE

## 2023-03-03 PROCEDURE — 45380 COLONOSCOPY AND BIOPSY: CPT | Performed by: INTERNAL MEDICINE

## 2023-03-03 PROCEDURE — 43239 EGD BIOPSY SINGLE/MULTIPLE: CPT | Performed by: INTERNAL MEDICINE

## 2023-03-03 RX ORDER — SODIUM CHLORIDE, SODIUM LACTATE, POTASSIUM CHLORIDE, CALCIUM CHLORIDE 600; 310; 30; 20 MG/100ML; MG/100ML; MG/100ML; MG/100ML
30 INJECTION, SOLUTION INTRAVENOUS CONTINUOUS PRN
Status: DISCONTINUED | OUTPATIENT
Start: 2023-03-03 | End: 2023-03-03 | Stop reason: HOSPADM

## 2023-03-03 RX ORDER — PROPOFOL 10 MG/ML
VIAL (ML) INTRAVENOUS AS NEEDED
Status: DISCONTINUED | OUTPATIENT
Start: 2023-03-03 | End: 2023-03-03 | Stop reason: SURG

## 2023-03-03 RX ORDER — LIDOCAINE HYDROCHLORIDE 10 MG/ML
INJECTION, SOLUTION INFILTRATION; PERINEURAL AS NEEDED
Status: DISCONTINUED | OUTPATIENT
Start: 2023-03-03 | End: 2023-03-03 | Stop reason: SURG

## 2023-03-03 RX ORDER — GLYCOPYRROLATE 0.2 MG/ML
INJECTION INTRAMUSCULAR; INTRAVENOUS AS NEEDED
Status: DISCONTINUED | OUTPATIENT
Start: 2023-03-03 | End: 2023-03-03 | Stop reason: SURG

## 2023-03-03 RX ADMIN — PROPOFOL 120 MCG/KG/MIN: 10 INJECTION, EMULSION INTRAVENOUS at 08:51

## 2023-03-03 RX ADMIN — SODIUM CHLORIDE, POTASSIUM CHLORIDE, SODIUM LACTATE AND CALCIUM CHLORIDE 30 ML/HR: 600; 310; 30; 20 INJECTION, SOLUTION INTRAVENOUS at 07:59

## 2023-03-03 RX ADMIN — GLYCOPYRROLATE 0.2 MG: 0.2 INJECTION INTRAMUSCULAR; INTRAVENOUS at 08:51

## 2023-03-03 RX ADMIN — PROPOFOL 30 MG: 10 INJECTION, EMULSION INTRAVENOUS at 08:57

## 2023-03-03 RX ADMIN — PROPOFOL 150 MG: 10 INJECTION, EMULSION INTRAVENOUS at 08:51

## 2023-03-03 RX ADMIN — LIDOCAINE HYDROCHLORIDE 40 MG: 10 INJECTION, SOLUTION INFILTRATION; PERINEURAL at 08:51

## 2023-03-03 RX ADMIN — PROPOFOL 30 MG: 10 INJECTION, EMULSION INTRAVENOUS at 09:04

## 2023-03-03 NOTE — DISCHARGE INSTRUCTIONS
For the next 24 hours patient needs to be with a responsible adult.    For 24 hours DO NOT drive, operate machinery, appliances, drink alcohol, make important decisions or sign legal documents.    Start with a light or bland diet if you are feeling sick to your stomach otherwise advance to regular diet as tolerated.    Follow recommendations on procedure report if provided by your doctor.    Call Dr. Villavicencio for problems [phone]    Problems may include but not limited to: large amounts of bleeding, trouble breathing, repeated vomiting, severe unrelieved pain, fever or chills.

## 2023-03-03 NOTE — ANESTHESIA PREPROCEDURE EVALUATION
Anesthesia Evaluation     Patient summary reviewed and Nursing notes reviewed   no history of anesthetic complications:  NPO Solid Status: > 8 hours  NPO Liquid Status: > 2 hours           Airway   Mallampati: II  TM distance: >3 FB  Neck ROM: full  no difficulty expected  Dental - normal exam     Pulmonary - normal exam   (+) pneumonia resolved , a smoker Former,   (-) COPD, asthma, lung cancer  Cardiovascular - normal exam  Exercise tolerance: good (4-7 METS)    Rhythm: regular  Rate: normal    (+) hypertension well controlled 2 medications or greater, hyperlipidemia,   (-) valvular problems/murmurs, past MI, CAD, dysrhythmias, angina, CHF, cardiac stents      Neuro/Psych  (+) headaches, psychiatric history Anxiety and Depression,    (-) seizures, TIA, CVA  GI/Hepatic/Renal/Endo    (+)  GERD poorly controlled,    (-) PUD, hepatitis, liver disease, no renal disease, diabetes, GI bleed, no thyroid disorder    Musculoskeletal     Abdominal  - normal exam   Substance History - negative use     OB/GYN          Other   arthritis,                    Anesthesia Plan    ASA 2     MAC     intravenous induction     Anesthetic plan, risks, benefits, and alternatives have been provided, discussed and informed consent has been obtained with: patient.        CODE STATUS:

## 2023-03-03 NOTE — ANESTHESIA POSTPROCEDURE EVALUATION
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          Post Anesthesia Care and Evaluation    Patient location during evaluation: bedside  Patient participation: complete - patient participated  Level of consciousness: responsive to light touch, responsive to verbal stimuli and sleepy but conscious  Pain score: 0  Pain management: adequate    Airway patency: patent  Anesthetic complications: No anesthetic complications  PONV Status: none  Cardiovascular status: acceptable and hemodynamically stable  Respiratory status: acceptable  Hydration status: acceptable

## 2023-03-03 NOTE — H&P
[facility]  Pre Procedure History & Physical    Chief Complaint:   Time for my EGD and colonoscopy    Subjective     HPI:   73 y.o. female who has a history of dyspepsia.  In addition she she has a family history of colon cancer and that her dad and paternal grandmother had colon cancer.  She has a history of colon polyps.  Her last colonoscopy was in September 2018.    Past Medical History:   Past Medical History:   Diagnosis Date   • Allergic    • Anxiety    • Arthritis    • Cholelithiasis Prior to my 23th birthday   • Colon polyp 9/11/18    I believe I had a polyp  during colonoscopy   • Depression    • GERD (gastroesophageal reflux disease)    • Headache    • Hyperlipidemia    • Hypertension    • Irritable bowel syndrome    • Low back pain    • Pneumonia    • Urinary tract infection    • Visual impairment        Family History:  Family History   Problem Relation Age of Onset   • Alzheimer's disease Mother    • Hypertension Mother    • Hyperlipidemia Mother    • Mental illness Mother         Alzheimer's   • Stroke Mother    • Colon cancer Father    • Hypertension Father    • Asthma Father    • Cancer Father         Father colon cancer, daughter Ovarian, sister ovarian   • Hearing loss Father    • Hyperlipidemia Father    • Ovarian cancer Daughter        Social History:   reports that she quit smoking about 33 years ago. Her smoking use included cigarettes. She has never used smokeless tobacco. She reports that she does not drink alcohol and does not use drugs.    Medications:   Medications Prior to Admission   Medication Sig Dispense Refill Last Dose   • Biotin 10 MG tablet Take  by mouth.   3/2/2023   • buPROPion XL (WELLBUTRIN XL) 300 MG 24 hr tablet Take 1 tablet by mouth Daily. 90 tablet 1 3/2/2023   • Cetirizine HCl 10 MG capsule Take  by mouth.   3/2/2023   • D-Mannose 500 MG capsule Take 4 capsules by mouth.   Past Week   • estradiol (ESTRACE) 0.1 MG/GM vaginal cream Insert 0.001 g  "into the vagina Daily.   Past Week   • metoclopramide (REGLAN) 10 MG tablet Take 1 tablet by mouth 4 (Four) Times a Day Before Meals & at Bedtime.   Past Month   • omeprazole (priLOSEC) 20 MG capsule Take 1 capsule by mouth Daily.   3/2/2023   • rosuvastatin (CRESTOR) 10 MG tablet TAKE 1 TABLET DAILY 90 tablet 1 3/2/2023   • spironolactone (ALDACTONE) 50 MG tablet TAKE 1 AND 1/2 TABLETS     DAILY 135 tablet 0 3/3/2023   • verapamil ER (VERELAN) 240 MG 24 hr capsule Take 1 capsule by mouth Daily. 90 capsule 1 3/2/2023       Allergies:  Codeine and Sulfamethoxazole-trimethoprim    ROS:    Pertinent items are noted in HPI     Objective     Blood pressure 151/68, pulse 77, resp. rate 17, height 162.6 cm (64\"), weight 56.7 kg (125 lb), SpO2 94 %.    Physical Exam   Constitutional: Pt is oriented to person, place, and time and well-developed, well-nourished, and in no distress.   HENT:   Mouth/Throat: Oropharynx is clear and moist.   Neck: Normal range of motion. Neck supple.   Cardiovascular: Normal rate, regular rhythm and normal heart sounds.    Pulmonary/Chest: Effort normal and breath sounds normal. No respiratory distress. No  wheezes.   Abdominal: Soft. Bowel sounds are normal.   Skin: Skin is warm and dry.   Psychiatric: Mood, memory, affect and judgment normal.     Assessment & Plan     Diagnosis:  73 y.o. female who has a history of dyspepsia.  In addition she she has a family history of colon cancer and that her dad and paternal grandmother had colon cancer.  She has a history of colon polyps.  Her last colonoscopy was in September 2018.    Anticipated Surgical Procedure:  EGD and Colonoscopy    The risks, benefits, and alternatives of this procedure have been discussed with the patient or the responsible party- the patient understands and agrees to proceed.    Jona Villavicencio M.D.  "

## 2023-03-06 LAB
LAB AP CASE REPORT: NORMAL
PATH REPORT.FINAL DX SPEC: NORMAL
PATH REPORT.GROSS SPEC: NORMAL

## 2023-03-06 NOTE — PROGRESS NOTES
03/06/23       Tell her that pathology from the EGD showed minimal chronic inflammation of the stomach with no evidence of Helicobacter pylori.       The colon polyps that were removed were not cancerous but were precancerous.  I would recommend that she have a repeat colonoscopy in 2 to 3 years.  Please send a copy of this report to her PCP.  Roberta lal

## 2023-03-13 DIAGNOSIS — I10 ESSENTIAL HYPERTENSION: ICD-10-CM

## 2023-03-13 DIAGNOSIS — F33.0 MILD EPISODE OF RECURRENT MAJOR DEPRESSIVE DISORDER: ICD-10-CM

## 2023-03-13 DIAGNOSIS — I10 HYPERTENSION, UNSPECIFIED TYPE: ICD-10-CM

## 2023-03-20 DIAGNOSIS — I10 ESSENTIAL HYPERTENSION: ICD-10-CM

## 2023-03-20 RX ORDER — VERAPAMIL HYDROCHLORIDE 240 MG/1
240 CAPSULE, EXTENDED RELEASE ORAL DAILY
Qty: 30 CAPSULE | Refills: 0 | Status: SHIPPED | OUTPATIENT
Start: 2023-03-20

## 2023-03-20 NOTE — TELEPHONE ENCOUNTER
Rx Refill Note  Requested Prescriptions     Pending Prescriptions Disp Refills   • verapamil ER (VERELAN) 240 MG 24 hr capsule 30 capsule 0     Sig: Take 1 capsule by mouth Daily.      Last office visit with prescribing clinician: 12/22/2022   Last telemedicine visit with prescribing clinician: 4/17/2023   Next office visit with prescribing clinician: 4/17/2023                         Would you like a call back once the refill request has been completed: [] Yes [] No    If the office needs to give you a call back, can they leave a voicemail: [] Yes [] No    Shanice Rosenbaum, PCT  03/20/23, 08:43 EDT

## 2023-03-20 NOTE — TELEPHONE ENCOUNTER
Caller: Karena Perez    Relationship: Self    Best call back number:[phone]    Requested Prescriptions:   Requested Prescriptions     Pending Prescriptions Disp Refills   • verapamil ER (VERELAN) 240 MG 24 hr capsule 90 capsule 1     Sig: Take 1 capsule by mouth Daily.        Pharmacy where request should be sent: [id number] [place], [place] - [address] AT [facility]  [phone] [facility] [phone]      Additional details provided by patient: PATIENT ASKS THAT PRESCRIPTION FOR  30 DAY SUPPLY BE SENT TO [facility]. PATIENT IS GO[place] AND DOESN'T HAVE ENOUGH MEDICATION TO LAST UNTIL MAIL ORDER ARRIVES     Does the patient have less than a 3 day supply:  [] Yes  [] No    Would you like a call back once the refill request has been completed: [] Yes [] No    If the office needs to give you a call back, can they leave a voicemail: [] Yes [] No    Florencia Fragoso Rep   03/20/23 08:18 EDT

## 2023-04-10 ENCOUNTER — TELEPHONE (OUTPATIENT)
Dept: FAMILY MEDICINE CLINIC | Facility: CLINIC | Age: 73
End: 2023-04-10
Payer: MEDICARE

## 2023-04-10 ENCOUNTER — OFFICE VISIT (OUTPATIENT)
Dept: NEUROLOGY | Facility: CLINIC | Age: 73
End: 2023-04-10
Payer: MEDICARE

## 2023-04-10 ENCOUNTER — TELEPHONE (OUTPATIENT)
Dept: GASTROENTEROLOGY | Facility: CLINIC | Age: 73
End: 2023-04-10
Payer: MEDICARE

## 2023-04-10 VITALS
WEIGHT: 129 LBS | OXYGEN SATURATION: 98 % | BODY MASS INDEX: 22.02 KG/M2 | HEIGHT: 64 IN | SYSTOLIC BLOOD PRESSURE: 128 MMHG | DIASTOLIC BLOOD PRESSURE: 76 MMHG | HEART RATE: 73 BPM

## 2023-04-10 DIAGNOSIS — R42 DIZZINESS: Primary | ICD-10-CM

## 2023-04-10 DIAGNOSIS — G43.109 OCULAR MIGRAINE: ICD-10-CM

## 2023-04-10 PROCEDURE — 3078F DIAST BP <80 MM HG: CPT | Performed by: PSYCHIATRY & NEUROLOGY

## 2023-04-10 PROCEDURE — 1159F MED LIST DOCD IN RCRD: CPT | Performed by: PSYCHIATRY & NEUROLOGY

## 2023-04-10 PROCEDURE — 99204 OFFICE O/P NEW MOD 45 MIN: CPT | Performed by: PSYCHIATRY & NEUROLOGY

## 2023-04-10 PROCEDURE — 1160F RVW MEDS BY RX/DR IN RCRD: CPT | Performed by: PSYCHIATRY & NEUROLOGY

## 2023-04-10 PROCEDURE — 3074F SYST BP LT 130 MM HG: CPT | Performed by: PSYCHIATRY & NEUROLOGY

## 2023-04-10 NOTE — TELEPHONE ENCOUNTER
CALLED AND LVM FOR PT TO RESCHEDULE APPT WITH DR SMITH FOR NEXT Monday 4/17 AS DR SMITH WILL BE OUT OF THE OFFICE.     HUB OKAY TO INFORM PT AND RESCHEDULE OV WITH DR SMITH FOR 6 MONTH FU. THANK YOU.

## 2023-04-10 NOTE — TELEPHONE ENCOUNTER
----- Message from Jona Villavicencio MD sent at 3/6/2023 12:34 PM EST -----  03/06/23       Tell her that pathology from the EGD showed minimal chronic inflammation of the stomach with no evidence of Helicobacter pylori.       The colon polyps that were removed were not cancerous but were precancerous.  I would recommend that she have a repeat colonoscopy in 2 to 3 years.  Please send a copy of this report to her PCP.  Frank. kjh

## 2023-04-10 NOTE — PROGRESS NOTES
"Chief Complaint   Patient presents with   • Migraine       Patient ID: Karena Perez is a 73 y.o. female.    HPI: I had the pleasure of seeing your patient today.  As you may know she is a 73-year-old female here for the evaluation of migraine.  The patient says that she began having migraine type phenomenon at least 20 years ago or so.  She says that at that time she would have a visual type aura followed by occasional headache however typically it was the visual disturbance without headaches.  The typical visual disturbance includes a blurry \"beveled glass\" visualization.  She typically does not experience any headache with that.  She has been previously seen by a neurologist for her symptoms.  She was treated with verapamil which she continues to take 240 mg sustained release daily.  She does not experience any focal weakness or numbness of her arms or legs with these visual symptoms.  She says that on a couple of occasions she has experienced some difficulties getting her words out.  Back 20 years ago at the outset of her symptoms she was admitted to the hospital and had a significant neurodiagnostic work-up.  This was negative apparently.  She was told at that time that she has \"ocular migraine\".  She does not use any abortive therapy when she experiences the visual disturbance.  She says that it only lasts for a few minutes and resolve spontaneously.  These symptoms do not appear to disrupt her quality of life.  She does not have a history of head injury.  Her mother and daughter both have what sounds to be migraine headaches.  She has had a few dizzy spells that are not associated with headaches or visual disturbance.  She says that it is a movement or spinning-like sensation.  She is unsure as to whether it is associated with changes of positioning.  She says that she was diagnosed with vertigo years ago and was taught the Epley maneuvers for those symptoms just in case a bad episode would arise.  She has no " change of hearing.  With those dizzy spells she experiences no sensitivity to light or sound.  No nausea or vomiting.    The following portions of the patient's history were reviewed and updated as appropriate: allergies, current medications, past family history, past medical history, past social history, past surgical history and problem list.    Review of Systems   Eyes: Positive for photophobia and visual disturbance.   Respiratory: Negative for cough, shortness of breath and wheezing.    Cardiovascular: Negative for chest pain, palpitations and leg swelling.   Gastrointestinal: Negative for diarrhea, nausea and vomiting.   Endocrine: Negative for cold intolerance, heat intolerance and polydipsia.   Genitourinary: Negative for decreased urine volume, difficulty urinating and urgency.   Allergic/Immunologic: Negative for environmental allergies, food allergies and immunocompromised state.   Neurological: Positive for dizziness and headaches. Negative for tremors, seizures, syncope, facial asymmetry, speech difficulty, weakness, light-headedness and numbness.   Hematological: Negative for adenopathy. Does not bruise/bleed easily.   Psychiatric/Behavioral: Positive for decreased concentration (word finding with migraine).      I have reviewed the review of systems above performed by my medical assistant.      Vitals:    04/10/23 1245   BP: 128/76   Pulse: 73   SpO2: 98%       Neurologic Exam     Mental Status   Oriented to person, place, and time.   Registration: recalls 3 of 3 objects. Follows 3 step commands.   Attention: normal. Concentration: normal.   Speech: speech is normal   Level of consciousness: alert  Knowledge: consistent with education (No deficits found.).   Normal comprehension.     Cranial Nerves     CN II   Visual fields full to confrontation.     CN III, IV, VI   Pupils are equal, round, and reactive to light.  Extraocular motions are normal.   CN III: no CN III palsy  CN VI: no CN VI  palsy  Nystagmus: none   Diplopia: none    CN V   Facial sensation intact.     CN VII   Facial expression full, symmetric.     CN VIII   CN VIII normal.     CN IX, X   CN IX normal.   CN X normal.     CN XI   CN XI normal.     CN XII   CN XII normal.     Motor Exam   Muscle bulk: normal  Right arm tone: normal  Left arm tone: normal  Right leg tone: normal  Left leg tone: normal    Strength   Right neck flexion: 5/5  Left neck flexion: 5/5  Right neck extension: 5/5  Left neck extension: 5/5  Right deltoid: 5/5  Left deltoid: 5/5  Right biceps: 5/5  Left biceps: 5/5  Right triceps: 5/5  Left triceps: 5/5  Right wrist flexion: 5/5  Left wrist flexion: 5/5  Right wrist extension: 5/5  Left wrist extension: 5/5  Right interossei: 5/5  Left interossei: 5/5  Right abdominals: 5/5  Left abdominals: 5/5  Right iliopsoas: 5/5  Left iliopsoas: 5/5  Right quadriceps: 5/5  Left quadriceps: 5/5  Right hamstrin/5  Left hamstrin/5  Right glutei: 5/5  Left glutei: 5/5  Right anterior tibial: 5/5  Left anterior tibial: 5/5  Right posterior tibial: 5/5  Left posterior tibial: 5/5  Right peroneal: 5/5  Left peroneal: 5/5  Right gastroc: 5/5  Left gastroc: 5/5    Sensory Exam   Light touch normal.   Vibration normal.   Proprioception normal.   Pinprick normal.     Gait, Coordination, and Reflexes     Gait  Gait: normal    Coordination   Romberg: negative    Tremor   Resting tremor: absent  Intention tremor: absent    Reflexes   Right brachioradialis: 2+  Left brachioradialis: 2+  Right biceps: 2+  Left biceps: 2+  Right triceps: 2+  Left triceps: 2+  Right patellar: 2+  Left patellar: 2+  Right achilles: 2+  Left achilles: 2+  Right : 2+  Left : 2+Station is normal.       Physical Exam  Vitals reviewed.   Constitutional:       General: She is not in acute distress.     Appearance: She is well-developed.   HENT:      Head: Normocephalic and atraumatic.   Eyes:      Extraocular Movements: EOM normal.      Pupils: Pupils  are equal, round, and reactive to light.   Cardiovascular:      Rate and Rhythm: Normal rate and regular rhythm.      Heart sounds: Normal heart sounds.   Pulmonary:      Effort: Pulmonary effort is normal. No respiratory distress.      Breath sounds: Normal breath sounds.   Abdominal:      General: Bowel sounds are normal. There is no distension.      Palpations: Abdomen is soft.      Tenderness: There is no abdominal tenderness.   Musculoskeletal:         General: No deformity.      Cervical back: Normal range of motion.   Skin:     General: Skin is warm.      Findings: No rash.   Neurological:      Mental Status: She is oriented to person, place, and time.      Coordination: Romberg Test normal.      Gait: Gait is intact.      Deep Tendon Reflexes:      Reflex Scores:       Tricep reflexes are 2+ on the right side and 2+ on the left side.       Bicep reflexes are 2+ on the right side and 2+ on the left side.       Brachioradialis reflexes are 2+ on the right side and 2+ on the left side.       Patellar reflexes are 2+ on the right side and 2+ on the left side.       Achilles reflexes are 2+ on the right side and 2+ on the left side.  Psychiatric:         Speech: Speech normal.         Judgment: Judgment normal.         Procedures    Assessment/Plan: I would like to schedule her for an MRI of the brain both with and without contrast as well as MRA imaging of the head and neck.  We discussed the verapamil and we will likely not change that dosing.  These ocular migraines do not disrupt her quality of life.  As well they only last a few minutes and resolve spontaneously therefore abortive therapy is not necessary.  I would like to refer her to the [facility] for an evaluation of the dizzy spells that she experiences that are likely vestibular in etiology.  We will see her back in 3 months or sooner if needed.       Diagnoses and all orders for this visit:    1. Dizziness (Primary)  -     MRI Brain With  & Without Contrast; Future  -     MRI angiogram head w wo contrast; Future  -     MRI Angiogram Neck Without Contrast; Future  -     Basic Vestibular Evaluation; Future    2. Ocular migraine  -     MRI Brain With & Without Contrast; Future  -     MRI angiogram head w wo contrast; Future  -     MRI Angiogram Neck Without Contrast; Future           Devang Hudson II, MD

## 2023-04-10 NOTE — TELEPHONE ENCOUNTER
Called pt and left  for pt to call back.     C/s placed in [facility] and  for 03/03/2025.    Results sent to Dr Ann thru [facility].

## 2023-04-10 NOTE — LETTER
"April 10, 2023       No Recipients    Patient: Karena Perez   YOB: 1950   Date of Visit: 4/10/2023       Dear Dr. Victor Recipients:    Thank you for referring Karena Perez to me for evaluation. Below are the relevant portions of my assessment and plan of care.    If you have questions, please do not hesitate to call me. I look forward to following Karena along with you.         Sincerely,        Devang Hudson II, MD        CC:   No Recipients    Devang Hudson II, MD  04/10/23 1352  Signed  Chief Complaint   Patient presents with   • Migraine       Patient ID: Karena Perez is a 73 y.o. female.    HPI: I had the pleasure of seeing your patient today.  As you may know she is a 73-year-old female here for the evaluation of migraine.  The patient says that she began having migraine type phenomenon at least 20 years ago or so.  She says that at that time she would have a visual type aura followed by occasional headache however typically it was the visual disturbance without headaches.  The typical visual disturbance includes a blurry \"beveled glass\" visualization.  She typically does not experience any headache with that.  She has been previously seen by a neurologist for her symptoms.  She was treated with verapamil which she continues to take 240 mg sustained release daily.  She does not experience any focal weakness or numbness of her arms or legs with these visual symptoms.  She says that on a couple of occasions she has experienced some difficulties getting her words out.  Back 20 years ago at the outset of her symptoms she was admitted to the hospital and had a significant neurodiagnostic work-up.  This was negative apparently.  She was told at that time that she has \"ocular migraine\".  She does not use any abortive therapy when she experiences the visual disturbance.  She says that it only lasts for a few minutes and resolve spontaneously.  These symptoms do not appear to disrupt her quality of " life.  She does not have a history of head injury.  Her mother and daughter both have what sounds to be migraine headaches.  She has had a few dizzy spells that are not associated with headaches or visual disturbance.  She says that it is a movement or spinning-like sensation.  She is unsure as to whether it is associated with changes of positioning.  She says that she was diagnosed with vertigo years ago and was taught the Epley maneuvers for those symptoms just in case a bad episode would arise.  She has no change of hearing.  With those dizzy spells she experiences no sensitivity to light or sound.  No nausea or vomiting.    The following portions of the patient's history were reviewed and updated as appropriate: allergies, current medications, past family history, past medical history, past social history, past surgical history and problem list.    Review of Systems   Eyes: Positive for photophobia and visual disturbance.   Respiratory: Negative for cough, shortness of breath and wheezing.    Cardiovascular: Negative for chest pain, palpitations and leg swelling.   Gastrointestinal: Negative for diarrhea, nausea and vomiting.   Endocrine: Negative for cold intolerance, heat intolerance and polydipsia.   Genitourinary: Negative for decreased urine volume, difficulty urinating and urgency.   Allergic/Immunologic: Negative for environmental allergies, food allergies and immunocompromised state.   Neurological: Positive for dizziness and headaches. Negative for tremors, seizures, syncope, facial asymmetry, speech difficulty, weakness, light-headedness and numbness.   Hematological: Negative for adenopathy. Does not bruise/bleed easily.   Psychiatric/Behavioral: Positive for decreased concentration (word finding with migraine).      I have reviewed the review of systems above performed by my medical assistant.      Vitals:    04/10/23 1245   BP: 128/76   Pulse: 73   SpO2: 98%       Neurologic Exam     Mental Status    Oriented to person, place, and time.   Registration: recalls 3 of 3 objects. Follows 3 step commands.   Attention: normal. Concentration: normal.   Speech: speech is normal   Level of consciousness: alert  Knowledge: consistent with education (No deficits found.).   Normal comprehension.     Cranial Nerves     CN II   Visual fields full to confrontation.     CN III, IV, VI   Pupils are equal, round, and reactive to light.  Extraocular motions are normal.   CN III: no CN III palsy  CN VI: no CN VI palsy  Nystagmus: none   Diplopia: none    CN V   Facial sensation intact.     CN VII   Facial expression full, symmetric.     CN VIII   CN VIII normal.     CN IX, X   CN IX normal.   CN X normal.     CN XI   CN XI normal.     CN XII   CN XII normal.     Motor Exam   Muscle bulk: normal  Right arm tone: normal  Left arm tone: normal  Right leg tone: normal  Left leg tone: normal    Strength   Right neck flexion: 5/5  Left neck flexion: 5/5  Right neck extension: 5/5  Left neck extension: 5/5  Right deltoid: 5/5  Left deltoid: 5/5  Right biceps: 5/5  Left biceps: 5/5  Right triceps: 5/5  Left triceps: 5/5  Right wrist flexion: 5/5  Left wrist flexion: 5/5  Right wrist extension: 5/5  Left wrist extension: 5/5  Right interossei: 5/5  Left interossei: 5/5  Right abdominals: 5/5  Left abdominals: 5/5  Right iliopsoas: 5/5  Left iliopsoas: 5/5  Right quadriceps: 5/5  Left quadriceps: 5/5  Right hamstrin/5  Left hamstrin/5  Right glutei: 5/5  Left glutei: 5/5  Right anterior tibial: 5/5  Left anterior tibial: 5/5  Right posterior tibial: 5/5  Left posterior tibial: 5/5  Right peroneal: 5/5  Left peroneal: 5/5  Right gastroc: 5/5  Left gastroc: 5/5    Sensory Exam   Light touch normal.   Vibration normal.   Proprioception normal.   Pinprick normal.     Gait, Coordination, and Reflexes     Gait  Gait: normal    Coordination   Romberg: negative    Tremor   Resting tremor: absent  Intention tremor: absent    Reflexes    Right brachioradialis: 2+  Left brachioradialis: 2+  Right biceps: 2+  Left biceps: 2+  Right triceps: 2+  Left triceps: 2+  Right patellar: 2+  Left patellar: 2+  Right achilles: 2+  Left achilles: 2+  Right : 2+  Left : 2+Station is normal.       Physical Exam  Vitals reviewed.   Constitutional:       General: She is not in acute distress.     Appearance: She is well-developed.   HENT:      Head: Normocephalic and atraumatic.   Eyes:      Extraocular Movements: EOM normal.      Pupils: Pupils are equal, round, and reactive to light.   Cardiovascular:      Rate and Rhythm: Normal rate and regular rhythm.      Heart sounds: Normal heart sounds.   Pulmonary:      Effort: Pulmonary effort is normal. No respiratory distress.      Breath sounds: Normal breath sounds.   Abdominal:      General: Bowel sounds are normal. There is no distension.      Palpations: Abdomen is soft.      Tenderness: There is no abdominal tenderness.   Musculoskeletal:         General: No deformity.      Cervical back: Normal range of motion.   Skin:     General: Skin is warm.      Findings: No rash.   Neurological:      Mental Status: She is oriented to person, place, and time.      Coordination: Romberg Test normal.      Gait: Gait is intact.      Deep Tendon Reflexes:      Reflex Scores:       Tricep reflexes are 2+ on the right side and 2+ on the left side.       Bicep reflexes are 2+ on the right side and 2+ on the left side.       Brachioradialis reflexes are 2+ on the right side and 2+ on the left side.       Patellar reflexes are 2+ on the right side and 2+ on the left side.       Achilles reflexes are 2+ on the right side and 2+ on the left side.  Psychiatric:         Speech: Speech normal.         Judgment: Judgment normal.         Procedures    Assessment/Plan: I would like to schedule her for an MRI of the brain both with and without contrast as well as MRA imaging of the head and neck.  We discussed the verapamil and we  will likely not change that dosing.  These ocular migraines do not disrupt her quality of life.  As well they only last a few minutes and resolve spontaneously therefore abortive therapy is not necessary.  I would like to refer her to the [facility] for an evaluation of the dizzy spells that she experiences that are likely vestibular in etiology.  We will see her back in 3 months or sooner if needed.      Diagnoses and all orders for this visit:    1. Dizziness (Primary)  -     MRI Brain With & Without Contrast; Future  -     MRI angiogram head w wo contrast; Future  -     MRI Angiogram Neck Without Contrast; Future  -     Basic Vestibular Evaluation; Future    2. Ocular migraine  -     MRI Brain With & Without Contrast; Future  -     MRI angiogram head w wo contrast; Future  -     MRI Angiogram Neck Without Contrast; Future          Devang Hudson II, MD

## 2023-04-12 RX ORDER — VERAPAMIL HYDROCHLORIDE 240 MG/1
CAPSULE, EXTENDED RELEASE ORAL
Qty: 90 CAPSULE | Refills: 1 | Status: SHIPPED | OUTPATIENT
Start: 2023-04-12

## 2023-04-12 RX ORDER — SPIRONOLACTONE 50 MG/1
TABLET, FILM COATED ORAL
Qty: 135 TABLET | Refills: 0 | Status: SHIPPED | OUTPATIENT
Start: 2023-04-12

## 2023-04-12 RX ORDER — BUPROPION HYDROCHLORIDE 300 MG/1
TABLET ORAL
Qty: 90 TABLET | Refills: 1 | Status: SHIPPED | OUTPATIENT
Start: 2023-04-12

## 2023-04-20 ENCOUNTER — PATIENT ROUNDING (BHMG ONLY) (OUTPATIENT)
Dept: NEUROLOGY | Facility: CLINIC | Age: 73
End: 2023-04-20
Payer: MEDICARE

## 2023-05-08 DIAGNOSIS — I10 HYPERTENSION, UNSPECIFIED TYPE: ICD-10-CM

## 2023-05-08 RX ORDER — SPIRONOLACTONE 50 MG/1
TABLET, FILM COATED ORAL
Qty: 90 TABLET | Refills: 3 | OUTPATIENT
Start: 2023-05-08

## 2023-05-09 ENCOUNTER — OFFICE VISIT (OUTPATIENT)
Dept: FAMILY MEDICINE CLINIC | Facility: CLINIC | Age: 73
End: 2023-05-09
Payer: MEDICARE

## 2023-05-09 VITALS
HEART RATE: 69 BPM | OXYGEN SATURATION: 97 % | HEIGHT: 64 IN | WEIGHT: 133 LBS | SYSTOLIC BLOOD PRESSURE: 142 MMHG | TEMPERATURE: 98 F | DIASTOLIC BLOOD PRESSURE: 80 MMHG | BODY MASS INDEX: 22.71 KG/M2

## 2023-05-09 DIAGNOSIS — I10 PRIMARY HYPERTENSION: Chronic | ICD-10-CM

## 2023-05-09 DIAGNOSIS — E78.5 HYPERLIPIDEMIA, UNSPECIFIED HYPERLIPIDEMIA TYPE: Primary | Chronic | ICD-10-CM

## 2023-05-09 DIAGNOSIS — F33.0 MILD EPISODE OF RECURRENT MAJOR DEPRESSIVE DISORDER: ICD-10-CM

## 2023-05-09 DIAGNOSIS — R53.83 OTHER FATIGUE: ICD-10-CM

## 2023-05-09 DIAGNOSIS — R73.9 HYPERGLYCEMIA: ICD-10-CM

## 2023-05-09 PROCEDURE — 3077F SYST BP >= 140 MM HG: CPT | Performed by: FAMILY MEDICINE

## 2023-05-09 PROCEDURE — 3079F DIAST BP 80-89 MM HG: CPT | Performed by: FAMILY MEDICINE

## 2023-05-09 PROCEDURE — 99214 OFFICE O/P EST MOD 30 MIN: CPT | Performed by: FAMILY MEDICINE

## 2023-05-09 RX ORDER — BUPROPION HYDROCHLORIDE 300 MG/1
300 TABLET ORAL DAILY
Qty: 90 TABLET | Refills: 3 | Status: SHIPPED | OUTPATIENT
Start: 2023-05-09

## 2023-05-09 RX ORDER — SPIRONOLACTONE 50 MG/1
75 TABLET, FILM COATED ORAL DAILY
Qty: 135 TABLET | Refills: 3 | Status: SHIPPED | OUTPATIENT
Start: 2023-05-09

## 2023-05-09 RX ORDER — VERAPAMIL HYDROCHLORIDE 240 MG/1
240 CAPSULE, EXTENDED RELEASE ORAL DAILY
Qty: 90 CAPSULE | Refills: 3 | Status: SHIPPED | OUTPATIENT
Start: 2023-05-09

## 2023-05-09 RX ORDER — METOCLOPRAMIDE 10 MG/1
10 TABLET ORAL AS NEEDED
Qty: 10 TABLET | Refills: 0 | Status: SHIPPED | OUTPATIENT
Start: 2023-05-09

## 2023-05-09 RX ORDER — ROSUVASTATIN CALCIUM 10 MG/1
10 TABLET, COATED ORAL DAILY
Qty: 90 TABLET | Refills: 3 | Status: SHIPPED | OUTPATIENT
Start: 2023-05-09

## 2023-05-09 NOTE — PROGRESS NOTES
"Chief Complaint  Hyperlipidemia, Hypertension, and Depression    Subjective        Karena Perez presents to [facility]  History of Present Illness 6-month follow-up on hyperlipidemia hypertension and depression  Patient denies any headache, blurring of vision, lightheadedness or dizziness.  She is not checking her blood pressure at home.  Patient has long history of hyperlipidemia denies any body ache, nausea vomiting.  Denies any problem with medication.  History of depression stable on current doses denies any SI or HI  Objective   Vital Signs:  /80 (BP Location: Left arm, Patient Position: Sitting, Cuff Size: Adult)   Pulse 69   Temp 98 °F (36.7 °C) (Temporal)   Ht 162.6 cm (64.02\")   Wt 60.3 kg (133 lb)   SpO2 97%   BMI 22.82 kg/m²   Estimated body mass index is 22.82 kg/m² as calculated from the following:    Height as of this encounter: 162.6 cm (64.02\").    Weight as of this encounter: 60.3 kg (133 lb).       BMI is within normal parameters. No other follow-up for BMI required.      Physical Exam  Constitutional:       General: She is not in acute distress.     Appearance: Normal appearance. She is well-developed.   HENT:      Head: Normocephalic and atraumatic.      Right Ear: Tympanic membrane normal.      Left Ear: Tympanic membrane normal.      Mouth/Throat:      Mouth: Mucous membranes are moist.   Eyes:      General:         Right eye: No discharge.         Left eye: No discharge.      Extraocular Movements: Extraocular movements intact.      Pupils: Pupils are equal, round, and reactive to light.   Cardiovascular:      Rate and Rhythm: Normal rate and regular rhythm.      Pulses: Normal pulses.      Heart sounds: Normal heart sounds.   Pulmonary:      Effort: Pulmonary effort is normal.      Breath sounds: Normal breath sounds. No wheezing or rales.   Abdominal:      General: Bowel sounds are normal.      Palpations: Abdomen is soft. There is no mass.      " Tenderness: There is no abdominal tenderness.   Musculoskeletal:      Cervical back: Normal range of motion and neck supple.      Right lower leg: No edema.      Left lower leg: No edema.   Lymphadenopathy:      Cervical: No cervical adenopathy.   Neurological:      General: No focal deficit present.      Mental Status: She is alert and oriented to person, place, and time.        Result Review :                   Assessment and Plan   Diagnoses and all orders for this visit:    1. Hyperlipidemia, unspecified hyperlipidemia type (Primary)  -     Cancel: Comprehensive Metabolic Panel; Future  -     Cancel: Lipid Panel; Future  -     Comprehensive Metabolic Panel; Future  -     Lipid Panel; Future    2. Primary hypertension  -     Cancel: Comprehensive Metabolic Panel; Future  -     Comprehensive Metabolic Panel; Future  -     spironolactone (ALDACTONE) 50 MG tablet; Take 1.5 tablets by mouth Daily.  Dispense: 135 tablet; Refill: 3  -     verapamil ER (VERELAN) 240 MG 24 hr capsule; Take 1 capsule by mouth Daily.  Dispense: 90 capsule; Refill: 3    3. Mild episode of recurrent major depressive disorder  -     buPROPion XL (WELLBUTRIN XL) 300 MG 24 hr tablet; Take 1 tablet by mouth Daily.  Dispense: 90 tablet; Refill: 3    4. Other fatigue  -     Cancel: CBC & Differential; Future  -     Cancel: TSH+Free T4; Future  -     CBC & Differential; Future  -     TSH+Free T4; Future    5. Hyperglycemia  -     Cancel: Hemoglobin A1c; Future  -     Hemoglobin A1c; Future    Other orders  -     metoclopramide (REGLAN) 10 MG tablet; Take 1 tablet by mouth As Needed (pain).  Dispense: 10 tablet; Refill: 0  -     rosuvastatin (CRESTOR) 10 MG tablet; Take 1 tablet by mouth Daily.  Dispense: 90 tablet; Refill: 3    Chris Karena is a 73-year-old female patient seen today for follow-up on  Hyperlipidemia, denies any problem continue same  Hypertension blood pressure at goal continue same we will check CMP before her next  visit.  Depression, symptoms stable on current doses of Wellbutrin continue same.      Patient with history of dizziness and ocular migraine headache was referred to neurology,             Follow Up   There are no Patient Instructions on file for this visit.   No follow-ups on file.  Patient was given instructions and counseling regarding her condition or for health maintenance advice. Please see specific information pulled into the AVS if appropriate.

## 2023-05-10 ENCOUNTER — HOSPITAL ENCOUNTER (OUTPATIENT)
Dept: MRI IMAGING | Facility: HOSPITAL | Age: 73
Discharge: HOME | End: 2023-05-10
Admitting: PSYCHIATRY & NEUROLOGY
Payer: MEDICARE

## 2023-05-10 DIAGNOSIS — G43.109 OCULAR MIGRAINE: ICD-10-CM

## 2023-05-10 DIAGNOSIS — R42 DIZZINESS: ICD-10-CM

## 2023-05-10 LAB — CREAT BLDA-MCNC: 1 MG/DL (ref 0.6–1.3)

## 2023-05-10 PROCEDURE — 82565 ASSAY OF CREATININE: CPT

## 2023-05-10 PROCEDURE — 70553 MRI BRAIN STEM W/O & W/DYE: CPT

## 2023-05-10 PROCEDURE — 0 GADOBENATE DIMEGLUMINE 529 MG/ML SOLUTION: Performed by: PSYCHIATRY & NEUROLOGY

## 2023-05-10 PROCEDURE — A9577 INJ MULTIHANCE: HCPCS | Performed by: PSYCHIATRY & NEUROLOGY

## 2023-05-10 RX ADMIN — GADOBENATE DIMEGLUMINE 12 ML: 529 INJECTION, SOLUTION INTRAVENOUS at 09:18

## 2023-05-15 ENCOUNTER — HOSPITAL ENCOUNTER (OUTPATIENT)
Dept: MRI IMAGING | Facility: HOSPITAL | Age: 73
Discharge: HOME | End: 2023-05-15
Payer: MEDICARE

## 2023-05-15 DIAGNOSIS — R42 DIZZINESS: ICD-10-CM

## 2023-05-15 DIAGNOSIS — G43.109 OCULAR MIGRAINE: ICD-10-CM

## 2023-05-15 PROCEDURE — 70549 MR ANGIOGRAPH NECK W/O&W/DYE: CPT

## 2023-05-15 PROCEDURE — 70544 MR ANGIOGRAPHY HEAD W/O DYE: CPT

## 2023-05-15 PROCEDURE — 0 GADOBENATE DIMEGLUMINE 529 MG/ML SOLUTION: Performed by: PSYCHIATRY & NEUROLOGY

## 2023-05-15 PROCEDURE — A9577 INJ MULTIHANCE: HCPCS | Performed by: PSYCHIATRY & NEUROLOGY

## 2023-05-15 RX ADMIN — GADOBENATE DIMEGLUMINE 12 ML: 529 INJECTION, SOLUTION INTRAVENOUS at 09:41

## 2023-05-18 ENCOUNTER — TELEPHONE (OUTPATIENT)
Dept: NEUROLOGY | Facility: CLINIC | Age: 73
End: 2023-05-18
Payer: MEDICARE

## 2023-05-23 ENCOUNTER — OFFICE VISIT (OUTPATIENT)
Dept: NEUROLOGY | Facility: CLINIC | Age: 73
End: 2023-05-23
Payer: MEDICARE

## 2023-05-23 VITALS
BODY MASS INDEX: 22.71 KG/M2 | HEIGHT: 64 IN | WEIGHT: 133 LBS | SYSTOLIC BLOOD PRESSURE: 118 MMHG | DIASTOLIC BLOOD PRESSURE: 82 MMHG | HEART RATE: 78 BPM | OXYGEN SATURATION: 98 %

## 2023-05-23 DIAGNOSIS — G43.109 OCULAR MIGRAINE: ICD-10-CM

## 2023-05-23 DIAGNOSIS — R42 DIZZINESS: ICD-10-CM

## 2023-05-23 DIAGNOSIS — I65.02 VERTEBRAL ARTERY STENOSIS, LEFT: Primary | ICD-10-CM

## 2023-05-23 PROCEDURE — 1159F MED LIST DOCD IN RCRD: CPT | Performed by: PSYCHIATRY & NEUROLOGY

## 2023-05-23 PROCEDURE — 3079F DIAST BP 80-89 MM HG: CPT | Performed by: PSYCHIATRY & NEUROLOGY

## 2023-05-23 PROCEDURE — 3074F SYST BP LT 130 MM HG: CPT | Performed by: PSYCHIATRY & NEUROLOGY

## 2023-05-23 PROCEDURE — 1160F RVW MEDS BY RX/DR IN RCRD: CPT | Performed by: PSYCHIATRY & NEUROLOGY

## 2023-05-23 PROCEDURE — 99214 OFFICE O/P EST MOD 30 MIN: CPT | Performed by: PSYCHIATRY & NEUROLOGY

## 2023-05-23 NOTE — PROGRESS NOTES
Chief complaint: Dizziness    Patient ID: Karena Perez is a 73 y.o. female.    HPI: I had the pleasure of seeing your patient again today.  As you may know she is a 73-year-old female here for the management of ocular migraine and dizziness.  She did have an MRI of the brain as well as an MRA of the head and neck.  The MRI of the brain did show 3 small left cerebellar infarcts.  MRA did show evidence for left vertebral artery stenosis.  She denies any new symptoms.  She may experience some dizziness occasionally however it is not as severe as previous visit.  She still does have a visit scheduled for the [facility].  This would be her first visit there.  She does take Crestor.  She is currently not on aspirin.  No recent ocular migraine symptoms.    The following portions of the patient's history were reviewed and updated as appropriate: allergies, current medications, past family history, past medical history, past social history, past surgical history and problem list.    Review of Systems   Constitutional: Negative.    Eyes: Negative.    Respiratory: Negative.    Cardiovascular: Negative.    Gastrointestinal: Negative.    Endocrine: Negative.    Genitourinary: Negative.    Musculoskeletal: Negative.    Skin: Negative.    Allergic/Immunologic: Negative.    Neurological: Positive for dizziness and headaches.   Hematological: Negative.    Psychiatric/Behavioral: Negative.       I have reviewed the review of systems above performed by my medical assistant.      Vitals:    05/23/23 1121   BP: 118/82   Pulse: 78   SpO2: 98%       Neurologic Exam     Mental Status   Oriented to person, place, and time.   Concentration: normal.   Level of consciousness: alert  Knowledge: consistent with education (No deficits found.).     Cranial Nerves     CN II   Visual fields full to confrontation.     CN III, IV, VI   Pupils are equal, round, and reactive to light.  Extraocular motions are normal.   CN III: no CN III  palsy  CN VI: no CN VI palsy    CN V   Facial sensation intact.     CN VII   Facial expression full, symmetric.     CN VIII   CN VIII normal.     CN IX, X   CN IX normal.   CN X normal.     CN XI   CN XI normal.     CN XII   CN XII normal.     Motor Exam     Strength   Right neck flexion: 5/5  Left neck flexion: 5/5  Right neck extension: 5/5  Left neck extension: 5/5  Right deltoid: 5/5  Left deltoid: 5/5  Right biceps: 5/5  Left biceps: 5/5  Right triceps: 5/5  Left triceps: 5/5  Right wrist flexion: 5/5  Left wrist flexion: 5/5  Right wrist extension: 5/5  Left wrist extension: 5/5  Right interossei: 5/5  Left interossei: 5/5  Right abdominals: 5/5  Left abdominals: 5/5  Right iliopsoas: 5/5  Left iliopsoas: 5/5  Right quadriceps: 5/5  Left quadriceps: 5/5  Right hamstrin/5  Left hamstrin/5  Right glutei: 5/5  Left glutei: 5/5  Right anterior tibial: 5/5  Left anterior tibial: 5/5  Right posterior tibial: 5/5  Left posterior tibial: 5/5  Right peroneal: 5/5  Left peroneal: 5/5  Right gastroc: 5/5  Left gastroc: 5/5    Sensory Exam   Light touch normal.   Vibration normal.     Gait, Coordination, and Reflexes     Gait  Gait: normal    Reflexes   Right brachioradialis: 2+  Left brachioradialis: 2+  Right biceps: 2+  Left biceps: 2+  Right triceps: 2+  Left triceps: 2+  Right patellar: 2+  Left patellar: 2+  Right achilles: 2+  Left achilles: 2+  Right : 2+  Left : 2+Station is normal.       Physical Exam  Vitals reviewed.   Constitutional:       Appearance: She is well-developed.   HENT:      Head: Normocephalic and atraumatic.   Eyes:      Extraocular Movements: EOM normal.      Pupils: Pupils are equal, round, and reactive to light.   Cardiovascular:      Rate and Rhythm: Normal rate and regular rhythm.   Pulmonary:      Breath sounds: Normal breath sounds.   Musculoskeletal:         General: Normal range of motion.   Skin:     General: Skin is warm.   Neurological:      Mental Status: She is  oriented to person, place, and time.      Gait: Gait is intact.      Deep Tendon Reflexes:      Reflex Scores:       Tricep reflexes are 2+ on the right side and 2+ on the left side.       Bicep reflexes are 2+ on the right side and 2+ on the left side.       Brachioradialis reflexes are 2+ on the right side and 2+ on the left side.       Patellar reflexes are 2+ on the right side and 2+ on the left side.       Achilles reflexes are 2+ on the right side and 2+ on the left side.        Procedures    Assessment/Plan: We are going to schedule her for a CTA of the head to evaluate the vertebral artery stenosis.  We we will likely start aspirin versus other therapies based on that result.  We will see her back in 5 months or sooner if needed.  A total of 20 minutes was spent face-to-face with the patient today.  Of that greater than 50% of this time was spent discussing signs and symptoms of vertebral artery stenosis, dizziness, ocular migraine, patient education, plan of care and prognosis.         Diagnoses and all orders for this visit:    1. Vertebral artery stenosis, left (Primary)  -     CT Angiogram Head; Future  -     CT Angiogram Neck With & Without Contrast; Future    2. Dizziness    3. Ocular migraine           Devang Hudson II, MD

## 2023-06-26 ENCOUNTER — TELEPHONE (OUTPATIENT)
Dept: NEUROLOGY | Facility: CLINIC | Age: 73
End: 2023-06-26

## 2023-06-26 NOTE — TELEPHONE ENCOUNTER
Provider: ALCIDES BOWERS MD    Caller: BEVERLY    Relationship to Patient: SELF    Phone Number: [phone]    Reason for Call: PT CALLING TO S/W PROVIDER TO GET RESULTS FROM CTA.   STATED SHE WAS TOLD BY PROVIDER TO CALL HER SO HE COULD GO OVER THE RESULTS ON THE PHONE.  STATED IN MAY SAID SHE HAD 3 STROKES AND THE LATER TEST SHE HAD 5 TEST, IS THIS CORRECT?  PT HAS QUESTIONS.    SHE WANTS TO KNOW IF THERE IS BLOCKAGE?  WHAT IS THE RISK?  WHAT TREATMENT IS NEEDED?  WHAT IS THE PROGNOSIS?  DOES SHE NEED TO TAKE ASPRIN, WHICH SHE IS NOT TAKING RIGHT NOW?  DOES SHE NEED TO AVOID IBUPROFEN?     STATED SHE IS EXPECTING A CALL FROM DR. BOWERS NOT THE ASSISTANT.     When was the patient last seen: 5-23-23    PLEASE CALL & ADVISE

## 2023-07-10 ENCOUNTER — TELEPHONE (OUTPATIENT)
Dept: INTERNAL MEDICINE | Facility: CLINIC | Age: 73
End: 2023-07-10

## 2023-07-10 NOTE — TELEPHONE ENCOUNTER
Caller: Karena Perez    Relationship: Self    Best call back number:     [phone]        What was the call regarding: PATIENT WAS IN WITH SPOUSE TODAY, AND TALKED TO DR BLOUNT ABOUT ACCEPTING HER AS A NEW PATIENT- AND HE AGREED     Is it okay if the provider responds through MyChart: CALL AND SCHEDULE PLEASE

## 2023-07-13 PROBLEM — Z00.00 MEDICARE ANNUAL WELLNESS VISIT, SUBSEQUENT: Status: ACTIVE | Noted: 2023-07-13

## 2023-07-13 PROBLEM — S52.501A CLOSED FRACTURE OF RIGHT DISTAL RADIUS: Status: RESOLVED | Noted: 2019-09-20 | Resolved: 2023-07-13

## 2023-07-13 PROBLEM — Z00.00 HEALTHCARE MAINTENANCE: Status: ACTIVE | Noted: 2023-07-13

## 2023-07-14 ENCOUNTER — TELEPHONE (OUTPATIENT)
Dept: INTERNAL MEDICINE | Facility: CLINIC | Age: 73
End: 2023-07-14

## 2023-08-23 DIAGNOSIS — K21.9 GASTROESOPHAGEAL REFLUX DISEASE, UNSPECIFIED WHETHER ESOPHAGITIS PRESENT: ICD-10-CM

## 2023-08-23 RX ORDER — METOCLOPRAMIDE 10 MG/1
10 TABLET ORAL AS NEEDED
Qty: 30 TABLET | Refills: 0 | Status: SHIPPED | OUTPATIENT
Start: 2023-08-23

## 2023-08-23 NOTE — TELEPHONE ENCOUNTER
Rx Refill Note  Requested Prescriptions     Pending Prescriptions Disp Refills    metoclopramide (REGLAN) 10 MG tablet 30 tablet 0     Sig: Take 1 tablet by mouth As Needed (pain). Esophageal spasm (max dose: 30mg per day)      Last office visit with prescribing clinician: 7/13/2023   Last telemedicine visit with prescribing clinician: Visit date not found   Next office visit with prescribing clinician: Visit date not found                         Would you like a call back once the refill request has been completed: [] Yes [] No    If the office needs to give you a call back, can they leave a voicemail: [] Yes [] No    Sanjana Garcia MA  08/23/23, 10:17 EDT

## 2023-09-06 ENCOUNTER — OFFICE VISIT (OUTPATIENT)
Dept: NEUROLOGY | Facility: CLINIC | Age: 73
End: 2023-09-06
Payer: MEDICARE

## 2023-09-06 VITALS
DIASTOLIC BLOOD PRESSURE: 74 MMHG | WEIGHT: 124 LBS | HEIGHT: 64 IN | OXYGEN SATURATION: 97 % | BODY MASS INDEX: 21.17 KG/M2 | HEART RATE: 67 BPM | SYSTOLIC BLOOD PRESSURE: 128 MMHG

## 2023-09-06 DIAGNOSIS — G43.109 MIGRAINE WITH AURA AND WITHOUT STATUS MIGRAINOSUS, NOT INTRACTABLE: ICD-10-CM

## 2023-09-06 DIAGNOSIS — Z86.73 HISTORY OF STROKE: ICD-10-CM

## 2023-09-06 DIAGNOSIS — I65.02 OCCLUSION OF LEFT VERTEBRAL ARTERY: ICD-10-CM

## 2023-09-06 DIAGNOSIS — G43.109 OCULAR MIGRAINE: Primary | ICD-10-CM

## 2023-09-06 PROCEDURE — 3078F DIAST BP <80 MM HG: CPT | Performed by: PSYCHIATRY & NEUROLOGY

## 2023-09-06 PROCEDURE — 3074F SYST BP LT 130 MM HG: CPT | Performed by: PSYCHIATRY & NEUROLOGY

## 2023-09-06 PROCEDURE — 99213 OFFICE O/P EST LOW 20 MIN: CPT | Performed by: PSYCHIATRY & NEUROLOGY

## 2023-09-06 PROCEDURE — 1160F RVW MEDS BY RX/DR IN RCRD: CPT | Performed by: PSYCHIATRY & NEUROLOGY

## 2023-09-06 PROCEDURE — 1159F MED LIST DOCD IN RCRD: CPT | Performed by: PSYCHIATRY & NEUROLOGY

## 2023-09-06 RX ORDER — DICLOFENAC SODIUM 1 MG/ML
SOLUTION/ DROPS OPHTHALMIC
COMMUNITY
Start: 2023-08-25

## 2023-09-06 NOTE — LETTER
"September 6, 2023       No Recipients    Patient: Karena Perez   YOB: 1950   Date of Visit: 9/6/2023     Dear Tanvi Haley MD:       Thank you for referring Karena Perez to me for evaluation. Below are the relevant portions of my assessment and plan of care.    If you have questions, please do not hesitate to call me. I look forward to following Karena along with you.         Sincerely,        Devang Hudson II, MD        CC:   No Recipients    Devang Hudson II, MD  09/06/23 1112  Sign when Signing Visit  Chief Complaint   Patient presents with   • Dizziness       Patient ID: Karena Perez is a 73 y.o. female.    HPI: I had the pleasure of seeing your patient today.  As you may know she is a 73-year-old female here for the management of stroke with vertebral artery stenosis as well as migraine headaches with aura and ocular migraine.  She did have the CTA showing left vertebral artery occlusion, chronic.  We did review that test today together.  She has not had any focal weakness or numbness of her arms or legs.  No double vision.  She has had an episode of aura as well as an episode of migraine with aura.  Her typical aura consists of \"zigzag\" lines in her visual fields that last for several minutes and resolve spontaneously.  She will also experience \"flashes of light\" in her visual fields of the same duration.  Her headache symptoms are not severe.  She typically does not take any abortive therapies.  She is still taking verapamil which has been her preventative medication for migraine and migraine with aura for several years now.    The following portions of the patient's history were reviewed and updated as appropriate: allergies, current medications, past family history, past medical history, past social history, past surgical history and problem list.    Review of Systems   Eyes:  Positive for photophobia and visual disturbance.   Allergic/Immunologic: Negative for environmental allergies, " food allergies and immunocompromised state.   Neurological:  Positive for headaches. Negative for dizziness, tremors, seizures, syncope, facial asymmetry, speech difficulty, weakness, light-headedness and numbness.   Hematological:  Negative for adenopathy. Does not bruise/bleed easily.   Psychiatric/Behavioral:  Negative for confusion, decreased concentration and sleep disturbance. The patient is not nervous/anxious.       I have reviewed the review of systems above performed by my medical assistant.      Vitals:    23 1026   BP: 128/74   Pulse: 67   SpO2: 97%       Neurologic Exam     Mental Status   Oriented to person, place, and time.   Concentration: normal.   Level of consciousness: alert  Knowledge: consistent with education (No deficits found.).     Cranial Nerves     CN II   Visual fields full to confrontation.     CN III, IV, VI   Pupils are equal, round, and reactive to light.  Extraocular motions are normal.   CN III: no CN III palsy  CN VI: no CN VI palsy    CN V   Facial sensation intact.     CN VII   Facial expression full, symmetric.     CN VIII   CN VIII normal.     CN IX, X   CN IX normal.   CN X normal.     CN XI   CN XI normal.     CN XII   CN XII normal.     Motor Exam     Strength   Right neck flexion: 5/5  Left neck flexion: 5/5  Right neck extension: 5/5  Left neck extension: 5/5  Right deltoid: 5/5  Left deltoid: 5/5  Right biceps: 5/5  Left biceps: 5/5  Right triceps: 5/5  Left triceps: 5/5  Right wrist flexion: 5/5  Left wrist flexion: 5/5  Right wrist extension: 5/5  Left wrist extension: 5/5  Right interossei: 5/5  Left interossei: 5/5  Right abdominals: 5/5  Left abdominals: 5/5  Right iliopsoas: 5/5  Left iliopsoas: 5/5  Right quadriceps: 5/5  Left quadriceps: 5/5  Right hamstrin/5  Left hamstrin/5  Right glutei: 5/5  Left glutei: 5/5  Right anterior tibial: 5/5  Left anterior tibial: 5/5  Right posterior tibial: 5/5  Left posterior tibial: 5/5  Right peroneal: 5/5  Left  peroneal: 5/5  Right gastroc: 5/5  Left gastroc: 5/5    Sensory Exam   Light touch normal.   Vibration normal.     Gait, Coordination, and Reflexes     Gait  Gait: normal    Reflexes   Right brachioradialis: 2+  Left brachioradialis: 2+  Right biceps: 2+  Left biceps: 2+  Right triceps: 2+  Left triceps: 2+  Right patellar: 2+  Left patellar: 2+  Right achilles: 2+  Left achilles: 2+  Right : 2+  Left : 2+Station is normal.     Physical Exam  Vitals reviewed.   Constitutional:       Appearance: She is well-developed.   HENT:      Head: Normocephalic and atraumatic.   Eyes:      Extraocular Movements: EOM normal.      Pupils: Pupils are equal, round, and reactive to light.   Cardiovascular:      Rate and Rhythm: Normal rate and regular rhythm.   Pulmonary:      Breath sounds: Normal breath sounds.   Musculoskeletal:         General: Normal range of motion.   Skin:     General: Skin is warm.   Neurological:      Mental Status: She is oriented to person, place, and time.      Gait: Gait is intact.      Deep Tendon Reflexes:      Reflex Scores:       Tricep reflexes are 2+ on the right side and 2+ on the left side.       Bicep reflexes are 2+ on the right side and 2+ on the left side.       Brachioradialis reflexes are 2+ on the right side and 2+ on the left side.       Patellar reflexes are 2+ on the right side and 2+ on the left side.       Achilles reflexes are 2+ on the right side and 2+ on the left side.      Procedures    Assessment/Plan: We again we will forego any further neurodiagnostic testing at this time.  She has ocular migraine as well as migraine with aura.  She would like to hold off on trying Nurtec or Ubrelvy as abortive therapy seeing how her symptoms are minimal in duration and intensity.  We did talk about stroke and strokelike symptoms.  She does understand to seek emergent medical attention if any of those symptoms were to arise.  We will see her back in 8 months or sooner if needed.  A  total of 25 minutes was spent face-to-face with the patient today.  Of that greater than 50% of this time was spent discussing signs and symptoms of stroke, ocular migraine, migraine with aura, patient education, plan of care and prognosis.         Diagnoses and all orders for this visit:    1. Ocular migraine (Primary)    2. Occlusion of left vertebral artery    3. History of stroke    4. Migraine with aura and without status migrainosus, not intractable           Devang Hudson II, MD

## 2023-09-06 NOTE — PROGRESS NOTES
"Chief Complaint   Patient presents with    Dizziness       Patient ID: Karena Perez is a 73 y.o. female.    HPI: I had the pleasure of seeing your patient today.  As you may know she is a 73-year-old female here for the management of stroke with vertebral artery stenosis as well as migraine headaches with aura and ocular migraine.  She did have the CTA showing left vertebral artery occlusion, chronic.  We did review that test today together.  She has not had any focal weakness or numbness of her arms or legs.  No double vision.  She has had an episode of aura as well as an episode of migraine with aura.  Her typical aura consists of \"zigzag\" lines in her visual fields that last for several minutes and resolve spontaneously.  She will also experience \"flashes of light\" in her visual fields of the same duration.  Her headache symptoms are not severe.  She typically does not take any abortive therapies.  She is still taking verapamil which has been her preventative medication for migraine and migraine with aura for several years now.    The following portions of the patient's history were reviewed and updated as appropriate: allergies, current medications, past family history, past medical history, past social history, past surgical history and problem list.    Review of Systems   Eyes:  Positive for photophobia and visual disturbance.   Allergic/Immunologic: Negative for environmental allergies, food allergies and immunocompromised state.   Neurological:  Positive for headaches. Negative for dizziness, tremors, seizures, syncope, facial asymmetry, speech difficulty, weakness, light-headedness and numbness.   Hematological:  Negative for adenopathy. Does not bruise/bleed easily.   Psychiatric/Behavioral:  Negative for confusion, decreased concentration and sleep disturbance. The patient is not nervous/anxious.       I have reviewed the review of systems above performed by my medical assistant.      Vitals:    09/06/23 " 1026   BP: 128/74   Pulse: 67   SpO2: 97%       Neurologic Exam     Mental Status   Oriented to person, place, and time.   Concentration: normal.   Level of consciousness: alert  Knowledge: consistent with education (No deficits found.).     Cranial Nerves     CN II   Visual fields full to confrontation.     CN III, IV, VI   Pupils are equal, round, and reactive to light.  Extraocular motions are normal.   CN III: no CN III palsy  CN VI: no CN VI palsy    CN V   Facial sensation intact.     CN VII   Facial expression full, symmetric.     CN VIII   CN VIII normal.     CN IX, X   CN IX normal.   CN X normal.     CN XI   CN XI normal.     CN XII   CN XII normal.     Motor Exam     Strength   Right neck flexion: 5/5  Left neck flexion: 5/5  Right neck extension: 5/5  Left neck extension: 5/5  Right deltoid: 5/5  Left deltoid: 5/5  Right biceps: 5/5  Left biceps: 5/5  Right triceps: 5/5  Left triceps: 5/5  Right wrist flexion: 5/5  Left wrist flexion: 5/5  Right wrist extension: 5/5  Left wrist extension: 5/5  Right interossei: 5/5  Left interossei: 5/5  Right abdominals: 5/5  Left abdominals: 5/5  Right iliopsoas: 5/5  Left iliopsoas: 5/5  Right quadriceps: 5/5  Left quadriceps: 5/5  Right hamstrin/5  Left hamstrin/5  Right glutei: 5/5  Left glutei: 5/5  Right anterior tibial: 5/5  Left anterior tibial: 5/5  Right posterior tibial: 5/5  Left posterior tibial: 5/5  Right peroneal: 5/5  Left peroneal: 5/5  Right gastroc: 5/5  Left gastroc: 5/5    Sensory Exam   Light touch normal.   Vibration normal.     Gait, Coordination, and Reflexes     Gait  Gait: normal    Reflexes   Right brachioradialis: 2+  Left brachioradialis: 2+  Right biceps: 2+  Left biceps: 2+  Right triceps: 2+  Left triceps: 2+  Right patellar: 2+  Left patellar: 2+  Right achilles: 2+  Left achilles: 2+  Right : 2+  Left : 2+Station is normal.     Physical Exam  Vitals reviewed.   Constitutional:       Appearance: She is well-developed.    HENT:      Head: Normocephalic and atraumatic.   Eyes:      Extraocular Movements: EOM normal.      Pupils: Pupils are equal, round, and reactive to light.   Cardiovascular:      Rate and Rhythm: Normal rate and regular rhythm.   Pulmonary:      Breath sounds: Normal breath sounds.   Musculoskeletal:         General: Normal range of motion.   Skin:     General: Skin is warm.   Neurological:      Mental Status: She is oriented to person, place, and time.      Gait: Gait is intact.      Deep Tendon Reflexes:      Reflex Scores:       Tricep reflexes are 2+ on the right side and 2+ on the left side.       Bicep reflexes are 2+ on the right side and 2+ on the left side.       Brachioradialis reflexes are 2+ on the right side and 2+ on the left side.       Patellar reflexes are 2+ on the right side and 2+ on the left side.       Achilles reflexes are 2+ on the right side and 2+ on the left side.      Procedures    Assessment/Plan: We again we will forego any further neurodiagnostic testing at this time.  She has ocular migraine as well as migraine with aura.  She would like to hold off on trying Nurtec or Ubrelvy as abortive therapy seeing how her symptoms are minimal in duration and intensity.  We did talk about stroke and strokelike symptoms.  She does understand to seek emergent medical attention if any of those symptoms were to arise.  We will see her back in 8 months or sooner if needed.  A total of 25 minutes was spent face-to-face with the patient today.  Of that greater than 50% of this time was spent discussing signs and symptoms of stroke, ocular migraine, migraine with aura, patient education, plan of care and prognosis.         Diagnoses and all orders for this visit:    1. Ocular migraine (Primary)    2. Occlusion of left vertebral artery    3. History of stroke    4. Migraine with aura and without status migrainosus, not intractable           Devang Hudson II, MD

## 2023-09-13 ENCOUNTER — TELEPHONE (OUTPATIENT)
Dept: NEUROLOGY | Facility: CLINIC | Age: 73
End: 2023-09-13
Payer: MEDICARE

## 2023-09-13 ENCOUNTER — DOCUMENTATION (OUTPATIENT)
Dept: NEUROLOGY | Facility: CLINIC | Age: 73
End: 2023-09-13
Payer: MEDICARE

## 2023-09-13 NOTE — TELEPHONE ENCOUNTER
Jacklyn from [facility] called requesting results from MRI and office notes from Ms Markts last visit since she is having surgery tomorrow.  Records faxed to Jacklyn at [phone].

## 2023-10-05 ENCOUNTER — PATIENT MESSAGE (OUTPATIENT)
Dept: INTERNAL MEDICINE | Facility: CLINIC | Age: 73
End: 2023-10-05
Payer: MEDICARE

## 2023-10-05 DIAGNOSIS — I10 PRIMARY HYPERTENSION: Chronic | ICD-10-CM

## 2023-10-05 DIAGNOSIS — F33.0 MILD EPISODE OF RECURRENT MAJOR DEPRESSIVE DISORDER: ICD-10-CM

## 2023-10-06 RX ORDER — BUPROPION HYDROCHLORIDE 300 MG/1
300 TABLET ORAL DAILY
Qty: 90 TABLET | Refills: 1 | Status: SHIPPED | OUTPATIENT
Start: 2023-10-06

## 2023-10-06 RX ORDER — VERAPAMIL HYDROCHLORIDE 240 MG/1
240 CAPSULE, EXTENDED RELEASE ORAL DAILY
Qty: 90 CAPSULE | Refills: 1 | Status: SHIPPED | OUTPATIENT
Start: 2023-10-06

## 2023-10-06 RX ORDER — SPIRONOLACTONE 50 MG/1
75 TABLET, FILM COATED ORAL DAILY
Qty: 135 TABLET | Refills: 1 | Status: SHIPPED | OUTPATIENT
Start: 2023-10-06

## 2023-10-20 ENCOUNTER — HOSPITAL ENCOUNTER (OUTPATIENT)
Facility: HOSPITAL | Age: 73
Setting detail: OBSERVATION
Discharge: HOME | End: 2023-10-21
Attending: EMERGENCY MEDICINE | Admitting: EMERGENCY MEDICINE
Payer: MEDICARE

## 2023-10-20 ENCOUNTER — APPOINTMENT (OUTPATIENT)
Dept: CT IMAGING | Facility: HOSPITAL | Age: 73
End: 2023-10-20
Payer: MEDICARE

## 2023-10-20 ENCOUNTER — APPOINTMENT (OUTPATIENT)
Dept: GENERAL RADIOLOGY | Facility: HOSPITAL | Age: 73
End: 2023-10-20
Payer: MEDICARE

## 2023-10-20 DIAGNOSIS — S02.2XXA CLOSED FRACTURE OF NASAL BONE, INITIAL ENCOUNTER: ICD-10-CM
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
## 2023-10-20 DIAGNOSIS — R55 SYNCOPE, UNSPECIFIED SYNCOPE TYPE: Primary | ICD-10-CM

## 2023-10-20 LAB
ALBUMIN SERPL-MCNC: 4.4 G/DL (ref 3.5–5.2)
ALBUMIN/GLOB SERPL: 1.5 G/DL
ALP SERPL-CCNC: 99 U/L (ref 39–117)
ALT SERPL W P-5'-P-CCNC: 14 U/L (ref 1–33)
ANION GAP SERPL CALCULATED.3IONS-SCNC: 13.7 MMOL/L (ref 5–15)
AST SERPL-CCNC: 24 U/L (ref 1–32)
BASOPHILS # BLD AUTO: 0.05 10*3/MM3 (ref 0–0.2)
BASOPHILS NFR BLD AUTO: 0.5 % (ref 0–1.5)
BILIRUB SERPL-MCNC: 0.3 MG/DL (ref 0–1.2)
BUN SERPL-MCNC: 11 MG/DL (ref 8–23)
BUN/CREAT SERPL: 12.9 (ref 7–25)
CALCIUM SPEC-SCNC: 9.5 MG/DL (ref 8.6–10.5)
CHLORIDE SERPL-SCNC: 101 MMOL/L (ref 98–107)
CO2 SERPL-SCNC: 20.3 MMOL/L (ref 22–29)
CREAT SERPL-MCNC: 0.85 MG/DL (ref 0.57–1)
DEPRECATED RDW RBC AUTO: 36.7 FL (ref 37–54)
EGFRCR SERPLBLD CKD-EPI 2021: 71.5 ML/MIN/1.73
EOSINOPHIL # BLD AUTO: 0.09 10*3/MM3 (ref 0–0.4)
EOSINOPHIL NFR BLD AUTO: 0.9 % (ref 0.3–6.2)
ERYTHROCYTE [DISTWIDTH] IN BLOOD BY AUTOMATED COUNT: 12.2 % (ref 12.3–15.4)
GLOBULIN UR ELPH-MCNC: 2.9 GM/DL
GLUCOSE SERPL-MCNC: 86 MG/DL (ref 65–99)
HCT VFR BLD AUTO: 44.1 % (ref 34–46.6)
HGB BLD-MCNC: 14.7 G/DL (ref 12–15.9)
IMM GRANULOCYTES # BLD AUTO: 0.05 10*3/MM3 (ref 0–0.05)
IMM GRANULOCYTES NFR BLD AUTO: 0.5 % (ref 0–0.5)
LYMPHOCYTES # BLD AUTO: 3.35 10*3/MM3 (ref 0.7–3.1)
LYMPHOCYTES NFR BLD AUTO: 35.3 % (ref 19.6–45.3)
MCH RBC QN AUTO: 28.3 PG (ref 26.6–33)
MCHC RBC AUTO-ENTMCNC: 33.3 G/DL (ref 31.5–35.7)
MCV RBC AUTO: 85 FL (ref 79–97)
MONOCYTES # BLD AUTO: 0.84 10*3/MM3 (ref 0.1–0.9)
MONOCYTES NFR BLD AUTO: 8.9 % (ref 5–12)
NEUTROPHILS NFR BLD AUTO: 5.1 10*3/MM3 (ref 1.7–7)
NEUTROPHILS NFR BLD AUTO: 53.9 % (ref 42.7–76)
NRBC BLD AUTO-RTO: 0 /100 WBC (ref 0–0.2)
PLATELET # BLD AUTO: 307 10*3/MM3 (ref 140–450)
PMV BLD AUTO: 9.3 FL (ref 6–12)
POTASSIUM SERPL-SCNC: 4.8 MMOL/L (ref 3.5–5.2)
PROT SERPL-MCNC: 7.3 G/DL (ref 6–8.5)
QT INTERVAL: 430 MS
QTC INTERVAL: 468 MS
RBC # BLD AUTO: 5.19 10*6/MM3 (ref 3.77–5.28)
SODIUM SERPL-SCNC: 135 MMOL/L (ref 136–145)
TROPONIN T SERPL HS-MCNC: 11 NG/L
TROPONIN T SERPL HS-MCNC: 9 NG/L
WBC NRBC COR # BLD: 9.48 10*3/MM3 (ref 3.4–10.8)

## 2023-10-20 PROCEDURE — 80053 COMPREHEN METABOLIC PANEL: CPT | Performed by: EMERGENCY MEDICINE

## 2023-10-20 PROCEDURE — 84484 ASSAY OF TROPONIN QUANT: CPT | Performed by: EMERGENCY MEDICINE

## 2023-10-20 PROCEDURE — G0378 HOSPITAL OBSERVATION PER HR: HCPCS

## 2023-10-20 PROCEDURE — 25010000002 ONDANSETRON PER 1 MG: Performed by: EMERGENCY MEDICINE

## 2023-10-20 PROCEDURE — 72125 CT NECK SPINE W/O DYE: CPT

## 2023-10-20 PROCEDURE — 70486 CT MAXILLOFACIAL W/O DYE: CPT

## 2023-10-20 PROCEDURE — 71045 X-RAY EXAM CHEST 1 VIEW: CPT

## 2023-10-20 PROCEDURE — 84484 ASSAY OF TROPONIN QUANT: CPT | Performed by: NURSE PRACTITIONER

## 2023-10-20 PROCEDURE — 96374 THER/PROPH/DIAG INJ IV PUSH: CPT

## 2023-10-20 PROCEDURE — 25810000003 SODIUM CHLORIDE 0.9 % SOLUTION

## 2023-10-20 PROCEDURE — 99284 EMERGENCY DEPT VISIT MOD MDM: CPT

## 2023-10-20 PROCEDURE — 70450 CT HEAD/BRAIN W/O DYE: CPT

## 2023-10-20 PROCEDURE — 85025 COMPLETE CBC W/AUTO DIFF WBC: CPT | Performed by: EMERGENCY MEDICINE

## 2023-10-20 PROCEDURE — 93010 ELECTROCARDIOGRAM REPORT: CPT | Performed by: INTERNAL MEDICINE

## 2023-10-20 PROCEDURE — 93005 ELECTROCARDIOGRAM TRACING: CPT | Performed by: EMERGENCY MEDICINE

## 2023-10-20 RX ORDER — AMOXICILLIN 250 MG
2 CAPSULE ORAL 2 TIMES DAILY
Status: DISCONTINUED | OUTPATIENT
Start: 2023-10-20 | End: 2023-10-21 | Stop reason: HOSPADM

## 2023-10-20 RX ORDER — POLYETHYLENE GLYCOL 3350 17 G/17G
17 POWDER, FOR SOLUTION ORAL DAILY PRN
Status: DISCONTINUED | OUTPATIENT
Start: 2023-10-20 | End: 2023-10-21 | Stop reason: HOSPADM

## 2023-10-20 RX ORDER — SODIUM CHLORIDE 9 MG/ML
40 INJECTION, SOLUTION INTRAVENOUS AS NEEDED
Status: DISCONTINUED | OUTPATIENT
Start: 2023-10-20 | End: 2023-10-21 | Stop reason: HOSPADM

## 2023-10-20 RX ORDER — SPIRONOLACTONE 25 MG/1
75 TABLET ORAL DAILY
Status: DISCONTINUED | OUTPATIENT
Start: 2023-10-21 | End: 2023-10-21 | Stop reason: HOSPADM

## 2023-10-20 RX ORDER — SODIUM CHLORIDE 0.9 % (FLUSH) 0.9 %
10 SYRINGE (ML) INJECTION AS NEEDED
Status: DISCONTINUED | OUTPATIENT
Start: 2023-10-20 | End: 2023-10-21 | Stop reason: HOSPADM

## 2023-10-20 RX ORDER — PANTOPRAZOLE SODIUM 40 MG/1
40 TABLET, DELAYED RELEASE ORAL
Status: DISCONTINUED | OUTPATIENT
Start: 2023-10-21 | End: 2023-10-21 | Stop reason: HOSPADM

## 2023-10-20 RX ORDER — ONDANSETRON 2 MG/ML
4 INJECTION INTRAMUSCULAR; INTRAVENOUS ONCE
Status: COMPLETED | OUTPATIENT
Start: 2023-10-20 | End: 2023-10-20

## 2023-10-20 RX ORDER — SODIUM CHLORIDE 0.9 % (FLUSH) 0.9 %
10 SYRINGE (ML) INJECTION EVERY 12 HOURS SCHEDULED
Status: DISCONTINUED | OUTPATIENT
Start: 2023-10-20 | End: 2023-10-21 | Stop reason: HOSPADM

## 2023-10-20 RX ORDER — BISACODYL 5 MG/1
5 TABLET, DELAYED RELEASE ORAL DAILY PRN
Status: DISCONTINUED | OUTPATIENT
Start: 2023-10-20 | End: 2023-10-21 | Stop reason: HOSPADM

## 2023-10-20 RX ORDER — BISACODYL 10 MG
10 SUPPOSITORY, RECTAL RECTAL DAILY PRN
Status: DISCONTINUED | OUTPATIENT
Start: 2023-10-20 | End: 2023-10-21 | Stop reason: HOSPADM

## 2023-10-20 RX ORDER — ROSUVASTATIN CALCIUM 20 MG/1
10 TABLET, COATED ORAL EVERY MORNING
Status: DISCONTINUED | OUTPATIENT
Start: 2023-10-21 | End: 2023-10-21 | Stop reason: HOSPADM

## 2023-10-20 RX ORDER — SODIUM CHLORIDE 9 MG/ML
75 INJECTION, SOLUTION INTRAVENOUS CONTINUOUS
Status: DISCONTINUED | OUTPATIENT
Start: 2023-10-20 | End: 2023-10-21 | Stop reason: HOSPADM

## 2023-10-20 RX ORDER — BUPROPION HYDROCHLORIDE 150 MG/1
300 TABLET ORAL EVERY MORNING
Status: DISCONTINUED | OUTPATIENT
Start: 2023-10-21 | End: 2023-10-21 | Stop reason: HOSPADM

## 2023-10-20 RX ORDER — ASPIRIN 81 MG/1
81 TABLET ORAL EVERY MORNING
Status: DISCONTINUED | OUTPATIENT
Start: 2023-10-21 | End: 2023-10-21 | Stop reason: HOSPADM

## 2023-10-20 RX ORDER — VERAPAMIL HYDROCHLORIDE 240 MG/1
240 CAPSULE, EXTENDED RELEASE ORAL EVERY MORNING
Status: DISCONTINUED | OUTPATIENT
Start: 2023-10-21 | End: 2023-10-21 | Stop reason: HOSPADM

## 2023-10-20 RX ORDER — METOCLOPRAMIDE 10 MG/1
10 TABLET ORAL AS NEEDED
Status: DISCONTINUED | OUTPATIENT
Start: 2023-10-20 | End: 2023-10-21 | Stop reason: HOSPADM

## 2023-10-20 RX ADMIN — ONDANSETRON 4 MG: 2 INJECTION INTRAMUSCULAR; INTRAVENOUS at 16:47

## 2023-10-20 RX ADMIN — Medication 10 ML: at 21:35

## 2023-10-20 RX ADMIN — SODIUM CHLORIDE 75 ML/HR: 9 INJECTION, SOLUTION INTRAVENOUS at 23:01

## 2023-10-20 NOTE — ED NOTES
Nursing report ED to floor  Karena Perez  73 y.o.  female    HPI :   Chief Complaint   Patient presents with    Fall       Admitting doctor:   Sachin Lima MD    Admitting diagnosis:   The primary encounter diagnosis was Syncope, unspecified syncope type. Diagnoses of Closed fracture of nasal bone, initial encounter and Fall, initial encounter were also pertinent to this visit.    Code status:   Current Code Status       Date Active Code Status Order ID Comments User Context       Not on file            Allergies:   Codeine and Sulfamethoxazole-trimethoprim    Isolation:   No active isolations    Intake and Output  No intake or output data in the 24 hours ending 10/20/23 1825    Weight:       10/20/23  1511   Weight: 55.8 kg (123 lb)       Most recent vitals:   Vitals:    10/20/23 1618 10/20/23 1629 10/20/23 1801 10/20/23 1825   BP:   121/90    BP Location:       Patient Position:       Pulse: 74 72 76 75   Resp:       Temp:       SpO2: 100% 99%  97%   Weight:       Height:           Active LDAs/IV Access:   Lines, Drains & Airways       Active LDAs       Name Placement date Placement time Site Days    Peripheral IV 10/20/23 1506 Anterior;Left Forearm 10/20/23  1506  Forearm  less than 1                    Labs (abnormal labs have a star):   Labs Reviewed   COMPREHENSIVE METABOLIC PANEL - Abnormal; Notable for the following components:       Result Value    Sodium 135 (*)     CO2 20.3 (*)     All other components within normal limits    Narrative:     GFR Normal >60  Chronic Kidney Disease <60  Kidney Failure <15    The GFR formula is only valid for adults with stable renal function between ages 18 and 70.   CBC WITH AUTO DIFFERENTIAL - Abnormal; Notable for the following components:    RDW 12.2 (*)     RDW-SD 36.7 (*)     Lymphocytes, Absolute 3.35 (*)     All other components within normal limits   TROPONIN - Normal    Narrative:     High Sensitive Troponin T Reference Range:  <10.0 ng/L- Negative Female for  AMI  <15.0 ng/L- Negative Male for AMI  >=10 - Abnormal Female indicating possible myocardial injury.  >=15 - Abnormal Male indicating possible myocardial injury.   Clinicians would have to utilize clinical acumen, EKG, Troponin, and serial changes to determine if it is an Acute Myocardial Infarction or myocardial injury due to an underlying chronic condition.        TROPONIN   CBC AND DIFFERENTIAL    Narrative:     The following orders were created for panel order CBC & Differential.  Procedure                               Abnormality         Status                     ---------                               -----------         ------                     CBC Auto Differential[[id number]]        Abnormal            Final result                 Please view results for these tests on the individual orders.       EKG:   ECG 12 Lead Syncope   Preliminary Result   HEART RATE= 71  bpm   RR Interval= 845  ms   TX Interval= 135  ms   P Horizontal Axis= 9  deg   P Front Axis= -17  deg   QRSD Interval= 84  ms   QT Interval= 430  ms   QTcB= 468  ms   QRS Axis= -1  deg   T Wave Axis= 44  deg   - ABNORMAL ECG -   Sinus rhythm   Left ventricular hypertrophy   Nonspecific T abnormalities, anterior leads   Minimal ST elevation, inferior leads   Electronically Signed By:    Date and Time of Study: 2023-10-20 16:51:35          Meds given in ED:   Medications   sodium chloride 0.9 % flush 10 mL (has no administration in time range)   sodium chloride 0.9 % flush 10 mL (has no administration in time range)   sodium chloride 0.9 % flush 10 mL (has no administration in time range)   sodium chloride 0.9 % infusion 40 mL (has no administration in time range)   sennosides-docusate (PERICOLACE) 8.6-50 MG per tablet 2 tablet (has no administration in time range)     And   polyethylene glycol (MIRALAX) packet 17 g (has no administration in time range)     And   bisacodyl (DULCOLAX) EC tablet 5 mg (has no administration in time range)     And    bisacodyl (DULCOLAX) suppository 10 mg (has no administration in time range)   ondansetron (ZOFRAN) injection 4 mg (4 mg Intravenous Given 10/20/23 1647)       Imaging results:  CT Head Without Contrast    Result Date: 10/20/2023  1. No acute intracranial process. 2. Nasal bone fracture. 3. Frontal scalp hematoma.  CT OF THE FACIAL BONES WITHOUT CONTRAST  Axial images were obtained through the facial bones without contrast. Sagittal and coronal reconstruction images were reviewed.  There is a mildly displaced and depressed nasal bone fracture. No other facial bone fractures are seen. There is a round probable mucus retention cyst in the left maxillary sinus measuring 1.7 cm. Remainder of the sinuses appear clear. Intraorbital contents appear within normal limits. Soft tissue hematoma over the nose and midline and leftward frontal regions is seen.  IMPRESSION: 1. Mildly displaced and depressed nasal bone fracture. 2. Mucous retention cyst in the left maxillary sinus. 3. Anterior nasal and left frontal scalp hematomas.  CT OF THE CERVICAL SPINE WITHOUT CONTRAST  Axial images were obtained from the skull base to the upper thoracic spine. Sagittal and coronal reconstruction images were reviewed.  There is degenerative disease of the C1-C2 articulation. There is C3-C4 disc space narrowing with mild spondylosis. There is mild anterolisthesis of C4 on C5 with some disc space narrowing. Mild-to-moderate spondylosis of C5-c6 and mild C6-c7 spondylosis is seen. No other significant subluxation is seen. There is multilevel facet joint disease. No fractures are seen. There is bilateral carotid calcification.  IMPRESSION: 1. Multilevel cervical degenerative disease. 2. No fractures are seen.  Radiation dose reduction techniques were utilized, including automated exposure control and exposure modulation based on body size.   This report was finalized on 10/20/2023 6:08 PM by Dr. Brayden Vaca M.D on Workstation: [id number]       CT Cervical Spine Without Contrast    Result Date: 10/20/2023  1. No acute intracranial process. 2. Nasal bone fracture. 3. Frontal scalp hematoma.  CT OF THE FACIAL BONES WITHOUT CONTRAST  Axial images were obtained through the facial bones without contrast. Sagittal and coronal reconstruction images were reviewed.  There is a mildly displaced and depressed nasal bone fracture. No other facial bone fractures are seen. There is a round probable mucus retention cyst in the left maxillary sinus measuring 1.7 cm. Remainder of the sinuses appear clear. Intraorbital contents appear within normal limits. Soft tissue hematoma over the nose and midline and leftward frontal regions is seen.  IMPRESSION: 1. Mildly displaced and depressed nasal bone fracture. 2. Mucous retention cyst in the left maxillary sinus. 3. Anterior nasal and left frontal scalp hematomas.  CT OF THE CERVICAL SPINE WITHOUT CONTRAST  Axial images were obtained from the skull base to the upper thoracic spine. Sagittal and coronal reconstruction images were reviewed.  There is degenerative disease of the C1-C2 articulation. There is C3-C4 disc space narrowing with mild spondylosis. There is mild anterolisthesis of C4 on C5 with some disc space narrowing. Mild-to-moderate spondylosis of C5-c6 and mild C6-c7 spondylosis is seen. No other significant subluxation is seen. There is multilevel facet joint disease. No fractures are seen. There is bilateral carotid calcification.  IMPRESSION: 1. Multilevel cervical degenerative disease. 2. No fractures are seen.  Radiation dose reduction techniques were utilized, including automated exposure control and exposure modulation based on body size.   This report was finalized on 10/20/2023 6:08 PM by Dr. Brayden Vaca M.D on Workstation: [id number]      CT Facial Bones Without Contrast    Result Date: 10/20/2023  1. No acute intracranial process. 2. Nasal bone fracture. 3. Frontal scalp hematoma.  CT OF THE  FACIAL BONES WITHOUT CONTRAST  Axial images were obtained through the facial bones without contrast. Sagittal and coronal reconstruction images were reviewed.  There is a mildly displaced and depressed nasal bone fracture. No other facial bone fractures are seen. There is a round probable mucus retention cyst in the left maxillary sinus measuring 1.7 cm. Remainder of the sinuses appear clear. Intraorbital contents appear within normal limits. Soft tissue hematoma over the nose and midline and leftward frontal regions is seen.  IMPRESSION: 1. Mildly displaced and depressed nasal bone fracture. 2. Mucous retention cyst in the left maxillary sinus. 3. Anterior nasal and left frontal scalp hematomas.  CT OF THE CERVICAL SPINE WITHOUT CONTRAST  Axial images were obtained from the skull base to the upper thoracic spine. Sagittal and coronal reconstruction images were reviewed.  There is degenerative disease of the C1-C2 articulation. There is C3-C4 disc space narrowing with mild spondylosis. There is mild anterolisthesis of C4 on C5 with some disc space narrowing. Mild-to-moderate spondylosis of C5-c6 and mild C6-c7 spondylosis is seen. No other significant subluxation is seen. There is multilevel facet joint disease. No fractures are seen. There is bilateral carotid calcification.  IMPRESSION: 1. Multilevel cervical degenerative disease. 2. No fractures are seen.  Radiation dose reduction techniques were utilized, including automated exposure control and exposure modulation based on body size.   This report was finalized on 10/20/2023 6:08 PM by Dr. Brayden Vaca M.D on Workstation: [id number]      XR Chest 1 View    Result Date: 10/20/2023  1. Underinflation of the lungs with no acute infiltrates.   This report was finalized on 10/20/2023 6:07 PM by Dr. Brayden Vaca M.D on Workstation: [id number]       Ambulatory status:   - up with assist only    Social issues:   Social History     Socioeconomic History     Marital status:     Number of children: 3   Tobacco Use    Smoking status: Former     Packs/day: 0.50     Years: 4.00     Additional pack years: 0.00     Total pack years: 2.00     Types: Cigarettes     Quit date: 1990     Years since quittin.8    Smokeless tobacco: Never    Tobacco comments:     Smoking in social settings   Vaping Use    Vaping Use: Never used   Substance and Sexual Activity    Alcohol use: Not Currently     Comment: Social    Drug use: No    Sexual activity: Not Currently     Partners: Male     Birth control/protection: Post-menopausal       NIH Stroke Scale:       Anh Ruiz RN  10/20/23 18:25 EDT

## 2023-10-20 NOTE — ED PROVIDER NOTES
EMERGENCY DEPARTMENT ENCOUNTER    Room Number:  39/39  PCP: Monster Hodges MD      HPI:  Chief Complaint: Fall  A complete HPI/ROS/PMH/PSH/SH/FH are unobtainable due to: Amnesia to the events  Context: Karena Perez is a 73 y.o. female who presents to the ED c/o fall.  Patient had a syncopal event presumably today.  She was walking through the [place] in the hospital to visit a family member who recently had a cardiac ablation.  She does not recall having any prodrome.  The next thing she knew she was on the ground after face planting.  She was reportedly unresponsive for about 30 seconds and then returned to baseline.  The fall itself was unwitnessed but her 2 daughters were standing right in front of her when this happened.          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    Gastroesophageal reflux disease 08/03/2018    Irritable bowel syndrome 08/03/2018    Microscopic hematuria 08/03/2018    Prolapse of female genital organs 08/03/2018    Seasonal allergic rhinitis 08/03/2018    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/2021    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    Medicare annual wellness visit, subsequent 07/13/2023    Healthcare maintenance 07/13/2023     Resolved Ambulatory Problems     Diagnosis Date Noted    Closed fracture of right distal radius 09/20/2019     Past Medical History:   Diagnosis Date    Allergic     Anxiety     Cholelithiasis Prior to my 23th birthday    GERD (gastroesophageal reflux disease)     Headache     Hyperlipidemia     Hypertension     Low back pain     Migraine     Pneumonia     Urinary tract  infection     Visual impairment          PAST SURGICAL HISTORY  Past Surgical History:   Procedure Laterality Date    APPENDECTOMY  1968    Removed along w/gall bladder    BREAST BIOPSY      CHOLECYSTECTOMY      COLONOSCOPY  09/22/2015    HP polyp, IH.    COLONOSCOPY  12/30/2011    EH, IH    COLONOSCOPY N/A 09/11/2018    Procedure: COLONOSCOPY WITH POLYPECTOMY (COLD BX);  Surgeon: Jona Villavicencio MD;  Location: [facility] ENDOSCOPY;  Service: Gastroenterology    COLONOSCOPY N/A 03/03/2023    Procedure: COLONOSCOPY to cecum with polypectomy;  Surgeon: Jona Villavicencio MD;  Location: [facility] ENDOSCOPY;  Service: Gastroenterology;  Laterality: N/A;  pre-hx of colon polyp, family hx of colon ca  post-diverticulosis, polyps, hemorrhoids    ENDOSCOPY N/A 09/11/2018    Procedure: ESOPHAGOGASTRODUODENOSCOPY WITH BIOPSIES PARSONS DILATION;  Surgeon: Jona Villavicencio MD;  Location: [facility] ENDOSCOPY;  Service: Gastroenterology    ENDOSCOPY N/A 03/03/2023    Procedure: ESOPHAGOGASTRODUODENOSCOPY with biopsy;  Surgeon: Jona Villavicencio MD;  Location: [facility] ENDOSCOPY;  Service: Gastroenterology;  Laterality: N/A;  pre-dyspepsia  post-gastritis    EYE SURGERY      Vision correction    TONSILLECTOMY  1964    TUBAL ABDOMINAL LIGATION  1968         FAMILY HISTORY  Family History   Problem Relation Age of Onset    Alzheimer's disease Mother     Hypertension Mother     Hyperlipidemia Mother     Mental illness Mother         Alzheimer's    Stroke Mother     Dementia Mother     Colon cancer Father     Hypertension Father     Asthma Father     Cancer Father         Father colon cancer, daughter Ovarian, sister ovarian    Hearing loss Father     Hyperlipidemia Father     Ovarian cancer Sister     Ovarian cancer Daughter          SOCIAL HISTORY  Social History     Socioeconomic History    Marital status:     Number of children: 3   Tobacco Use    Smoking status: Former     Packs/day: 0.50     Years: 4.00     Additional pack years: 0.00     Total  pack years: 2.00     Types: Cigarettes     Quit date: 1990     Years since quittin.8    Smokeless tobacco: Never    Tobacco comments:     Smoking in social settings   Vaping Use    Vaping Use: Never used   Substance and Sexual Activity    Alcohol use: Not Currently     Comment: Social    Drug use: No    Sexual activity: Not Currently     Partners: Male     Birth control/protection: Post-menopausal         ALLERGIES  Codeine and Sulfamethoxazole-trimethoprim        REVIEW OF SYSTEMS  Review of Systems     All systems reviewed and negative except for those discussed in HPI.       PHYSICAL EXAM  ED Triage Vitals   Temp Heart Rate Resp BP SpO2   10/20/23 1511 10/20/23 1511 10/20/23 1511 10/20/23 1512 10/20/23 151   98.1 °F (36.7 °C) 80 18 147/92 96 %      Temp src Heart Rate Source Patient Position BP Location FiO2 (%)   -- 10/20/23 1511 10/20/23 1511 10/20/23 1511 --    Monitor Sitting Right arm        Physical Exam      GENERAL: no acute distress  HENT: nares patent, left forehead and nasal bruising  EYES: no scleral icterus, left periocular bruising  CV: regular rhythm, normal rate  RESPIRATORY: normal effort  ABDOMEN: soft, nontender  MUSCULOSKELETAL: no deformity, no C/T/L-spine tenderness, no chest wall tenderness, no pain palpation of the 4 extremities and no pain with passive range of motion of the extremities  NEURO: alert, moves all extremities, follows commands  PSYCH:  calm, cooperative  SKIN: warm, dry, bruising as noted above    Vital signs and nursing notes reviewed.          LAB RESULTS  Recent Results (from the past 24 hour(s))   Comprehensive Metabolic Panel    Collection Time: 10/20/23  4:46 PM    Specimen: Blood   Result Value Ref Range    Glucose 86 65 - 99 mg/dL    BUN 11 8 - 23 mg/dL    Creatinine 0.85 0.57 - 1.00 mg/dL    Sodium 135 (L) 136 - 145 mmol/L    Potassium 4.8 3.5 - 5.2 mmol/L    Chloride 101 98 - 107 mmol/L    CO2 20.3 (L) 22.0 - 29.0 mmol/L    Calcium 9.5 8.6 - 10.5 mg/dL     Total Protein 7.3 6.0 - 8.5 g/dL    Albumin 4.4 3.5 - 5.2 g/dL    ALT (SGPT) 14 1 - 33 U/L    AST (SGOT) 24 1 - 32 U/L    Alkaline Phosphatase 99 39 - 117 U/L    Total Bilirubin 0.3 0.0 - 1.2 mg/dL    Globulin 2.9 gm/dL    A/G Ratio 1.5 g/dL    BUN/Creatinine Ratio 12.9 7.0 - 25.0    Anion Gap 13.7 5.0 - 15.0 mmol/L    eGFR 71.5 >60.0 mL/min/1.73   High Sensitivity Troponin T    Collection Time: 10/20/23  4:46 PM    Specimen: Blood   Result Value Ref Range    HS Troponin T 9 <10 ng/L   CBC Auto Differential    Collection Time: 10/20/23  4:46 PM    Specimen: Blood   Result Value Ref Range    WBC 9.48 3.40 - 10.80 10*3/mm3    RBC 5.19 3.77 - 5.28 10*6/mm3    Hemoglobin 14.7 12.0 - 15.9 g/dL    Hematocrit 44.1 34.0 - 46.6 %    MCV 85.0 79.0 - 97.0 fL    MCH 28.3 26.6 - 33.0 pg    MCHC 33.3 31.5 - 35.7 g/dL    RDW 12.2 (L) 12.3 - 15.4 %    RDW-SD 36.7 (L) 37.0 - 54.0 fl    MPV 9.3 6.0 - 12.0 fL    Platelets 307 140 - 450 10*3/mm3    Neutrophil % 53.9 42.7 - 76.0 %    Lymphocyte % 35.3 19.6 - 45.3 %    Monocyte % 8.9 5.0 - 12.0 %    Eosinophil % 0.9 0.3 - 6.2 %    Basophil % 0.5 0.0 - 1.5 %    Immature Grans % 0.5 0.0 - 0.5 %    Neutrophils, Absolute 5.10 1.70 - 7.00 10*3/mm3    Lymphocytes, Absolute 3.35 (H) 0.70 - 3.10 10*3/mm3    Monocytes, Absolute 0.84 0.10 - 0.90 10*3/mm3    Eosinophils, Absolute 0.09 0.00 - 0.40 10*3/mm3    Basophils, Absolute 0.05 0.00 - 0.20 10*3/mm3    Immature Grans, Absolute 0.05 0.00 - 0.05 10*3/mm3    nRBC 0.0 0.0 - 0.2 /100 WBC   ECG 12 Lead Syncope    Collection Time: 10/20/23  4:51 PM   Result Value Ref Range    QT Interval 430 ms    QTC Interval 468 ms       Ordered the above labs and reviewed the results.        RADIOLOGY  CT Head Without Contrast, CT Cervical Spine Without Contrast, CT Facial Bones Without Contrast    Result Date: 10/20/2023  HISTORY: Fell, head injury. Facial bone injury. Neck pain. Neck injury.  CT OF THE BRAIN WITHOUT CONTRAST  Axial images were obtained through  the brain without intravenous contrast.  There is mild-to-moderate diffuse atrophy. There is mild decreased attenuation of the periventricular white matter bilaterally consistent with mild small vessel white matter ischemic disease.  There is no evidence of acute infarction, hemorrhage, midline shift or mass effect. There is a scalp hematoma over the left frontal region. There is a slightly depressed fracture of the anterior and anterior leftward nasal bones. No other skull fractures are seen.      1. No acute intracranial process. 2. Nasal bone fracture. 3. Frontal scalp hematoma.  CT OF THE FACIAL BONES WITHOUT CONTRAST  Axial images were obtained through the facial bones without contrast. Sagittal and coronal reconstruction images were reviewed.  There is a mildly displaced and depressed nasal bone fracture. No other facial bone fractures are seen. There is a round probable mucus retention cyst in the left maxillary sinus measuring 1.7 cm. Remainder of the sinuses appear clear. Intraorbital contents appear within normal limits. Soft tissue hematoma over the nose and midline and leftward frontal regions is seen.  IMPRESSION: 1. Mildly displaced and depressed nasal bone fracture. 2. Mucous retention cyst in the left maxillary sinus. 3. Anterior nasal and left frontal scalp hematomas.  CT OF THE CERVICAL SPINE WITHOUT CONTRAST  Axial images were obtained from the skull base to the upper thoracic spine. Sagittal and coronal reconstruction images were reviewed.  There is degenerative disease of the C1-C2 articulation. There is C3-C4 disc space narrowing with mild spondylosis. There is mild anterolisthesis of C4 on C5 with some disc space narrowing. Mild-to-moderate spondylosis of C5-c6 and mild C6-c7 spondylosis is seen. No other significant subluxation is seen. There is multilevel facet joint disease. No fractures are seen. There is bilateral carotid calcification.  IMPRESSION: 1. Multilevel cervical degenerative  disease. 2. No fractures are seen.  Radiation dose reduction techniques were utilized, including automated exposure control and exposure modulation based on body size.   This report was finalized on 10/20/2023 6:08 PM by Dr. Brayden Vaca M.D on Workstation: [id number]      XR Chest 1 View    Result Date: 10/20/2023  XR CHEST 1 VW-10/20/2023  HISTORY: Hypertension. Evaluate for possible pneumonia. Patient fell.  Heart size is within normal limits. Lungs are underinflated but appear clear. Mild degenerative changes of the shoulders are seen.      1. Underinflation of the lungs with no acute infiltrates.   This report was finalized on 10/20/2023 6:07 PM by Dr. Brayden Vaca M.D on Workstation: [id number]       Ordered the above noted radiological studies. Reviewed by me in PACS.          PROCEDURES  Procedures          MEDICATIONS GIVEN IN ER  Medications   sodium chloride 0.9 % flush 10 mL (has no administration in time range)   ondansetron (ZOFRAN) injection 4 mg (4 mg Intravenous Given 10/20/23 1647)         MEDICAL DECISION MAKING, PROGRESS, and CONSULTS    Discussion below represents my analysis of pertinent findings related to patient's condition, differential diagnosis, treatment plan and final disposition.      Orders placed during this visit:  Orders Placed This Encounter   Procedures    CT Head Without Contrast    CT Cervical Spine Without Contrast    CT Facial Bones Without Contrast    XR Chest 1 View    Comprehensive Metabolic Panel    High Sensitivity Troponin T    CBC Auto Differential    Monitor Blood Pressure    Pulse Oximetry, Continuous    ECG 12 Lead Syncope    Insert Peripheral IV    CBC & Differential         Additional sources:  - Discussed/obtained information from independent historians: Family at bedside including  and 2 daughters  Additional information was obtained to confirm the patient's history.        Differential diagnosis:    Cardiac arrhythmia, orthostatic syncope,  mechanical fall          Independent interpretation of labs, radiology studies, and discussions with consultants:  ED Course as of 10/20/23 1822   Fri Oct 20, 2023   1722 EKG independently interpreted by myself.  Time 4:51 PM.  Sinus rhythm.  Heart rate 71.  Normal intervals and axis.  Nonspecific ST changes.  LVH. [TD]   1808 CT of the head, face, C-spine shows a nasal bone fracture.  No acute cranial process.  Nasal bone fracture is mildly displaced and depressed.  No C-spine issues. [TD]   1809 Chest x-ray independently interpreted by myself.  I see no evidence of acute pneumonia. [TD]   1809 HS Troponin T: 9 [TD]   1821 I discussed the case with Shavon Foster, parent with [facility] medicine.  We reviewed the patient's labs, history, imaging.  She will admit. [TD]      ED Course User Index  [TD] Milton Werner II, MD         Patient continues to have no chest pain or shortness of breath the time of admission.      DIAGNOSIS  Final diagnoses:   Syncope, unspecified syncope type   Closed fracture of nasal bone, initial encounter   Fall, initial encounter         DISPOSITION  Admit      Latest Documented Vital Signs:  As of 18:22 EDT  BP- 147/92 HR- 72 Temp- 98.1 °F (36.7 °C) O2 sat- 99%      --    Please note that portions of this were completed with a voice recognition program.       Note Disclaimer: At [facility], we believe that sharing information builds trust and better relationships. You are receiving this note because you are receiving care at [facility] or recently visited. It is possible you will see health information before a provider has talked with you about it. This kind of information can be easy to misunderstand. To help you fully understand what it means for your health, we urge you to discuss this note with your provider.         Milton Werner II, MD  10/20/23 1825

## 2023-10-20 NOTE — ED TRIAGE NOTES
Pt to ED from a code EMA. Unwitnessed fall between pedways. She was walking behind her daughters. Daughters report loss of consciousness. On blood thinning medications. Pt has repetitive questioning. Daughters report she is not confused at baseline. Had cataract surgery one week ago Thursday. C-collar in place. Pt in supine position.

## 2023-10-20 NOTE — H&P
[facility]   HISTORY AND PHYSICAL    Patient Name: Karena Perez  : 1950  MRN: [id number]  Primary Care Physician:  Monster Hodges MD  Date of admission: 10/20/2023    Subjective   Subjective     Chief Complaint:   Chief Complaint   Patient presents with    Fall         HPI:    Karena Perez is a 73 y.o. female, with past medical history including, but not limited to, hypertension, hyperlipidemia, irritable bowel syndrome, GERD, depression, and Coker's esophagus, presented to the emergency department after having a syncopal episode today.  She states she was walking on the pedway in the hospital and the next thing she knows she was waking up laying face down on the ground.  She denies any prodrome.  She denies any chest pain, shortness of breath, lightheadedness, dizziness, weakness, numbness, or tingling of extremities.  She denies any illness recently and states that she was in her normal state of health prior to her syncopal episode.  In the emergency department high-sensitivity troponins were 9, 11, sodium was 135, otherwise labs are unremarkable.  Chest x-ray shows under inflation of the lungs with no acute infiltrates.  CT brain without contrast shows no acute intercranial process, nasal bone fracture, frontal scalp hematoma.  CT T facial bones without contrast shows mildly displaced and depressed nasal bone fracture.  Mucous retention cyst in the left maxillary sinus.  Anterior nasal and left frontal scalp hematomas.  CT of the cervical spine without contrast shows multilevel cervical degenerative disease, no fractures are seen.  Echocardiogram has been ordered for the a.m.  Cardiology has been consulted to see the patient tomorrow.    Review of Systems   All systems were reviewed and negative except for: What was mentioned above in the HPI.    Personal History     Past Medical History:   Diagnosis Date    Allergic     Anxiety     Arthritis     Cholelithiasis Prior to my 23th birthday    Colon  polyp 9/11/18    I believe I had a polyp  during colonoscopy    Depression     GERD (gastroesophageal reflux disease)     Headache     Hyperlipidemia     Hypertension     Irritable bowel syndrome     Low back pain     Migraine     Pneumonia     Syncope 10/20/2023    Urinary tract infection     Visual impairment        Past Surgical History:   Procedure Laterality Date    APPENDECTOMY  1968    Removed along w/gall bladder    BREAST BIOPSY      CHOLECYSTECTOMY      COLONOSCOPY  09/22/2015    HP polyp, IH.    COLONOSCOPY  12/30/2011    EH, IH    COLONOSCOPY N/A 09/11/2018    Procedure: COLONOSCOPY WITH POLYPECTOMY (COLD BX);  Surgeon: Jona Villavicencio MD;  Location: [facility] ENDOSCOPY;  Service: Gastroenterology    COLONOSCOPY N/A 03/03/2023    Procedure: COLONOSCOPY to cecum with polypectomy;  Surgeon: Jona Villavicencio MD;  Location: [facility] ENDOSCOPY;  Service: Gastroenterology;  Laterality: N/A;  pre-hx of colon polyp, family hx of colon ca  post-diverticulosis, polyps, hemorrhoids    ENDOSCOPY N/A 09/11/2018    Procedure: ESOPHAGOGASTRODUODENOSCOPY WITH BIOPSIES PARSONS DILATION;  Surgeon: Jona Villavicencio MD;  Location: [facility] ENDOSCOPY;  Service: Gastroenterology    ENDOSCOPY N/A 03/03/2023    Procedure: ESOPHAGOGASTRODUODENOSCOPY with biopsy;  Surgeon: Jona Villavicencio MD;  Location: [facility] ENDOSCOPY;  Service: Gastroenterology;  Laterality: N/A;  pre-dyspepsia  post-gastritis    EYE SURGERY      Vision correction    TONSILLECTOMY  1964    TUBAL ABDOMINAL LIGATION  1968       Family History: family history includes Alzheimer's disease in her mother; Asthma in her father; Cancer in her father; Colon cancer in her father; Dementia in her mother; Hearing loss in her father; Hyperlipidemia in her father and mother; Hypertension in her father and mother; Mental illness in her mother; Ovarian cancer in her daughter and sister; Stroke in her mother. Otherwise pertinent FHx was reviewed and not pertinent to current issue.    Social  History:  reports that she quit smoking about 33 years ago. Her smoking use included cigarettes. She has a 2.00 pack-year smoking history. She has never used smokeless tobacco. She reports that she does not currently use alcohol. She reports that she does not use drugs.    Home Medications:  Biotin, Cetirizine HCl, D-Mannose, aspirin, buPROPion XL, diclofenac, estradiol, metoclopramide, omeprazole, rosuvastatin, spironolactone, and verapamil ER    Allergies:  Allergies   Allergen Reactions    Codeine Unknown - Low Severity     Category: Allergy;     Sulfamethoxazole-Trimethoprim        Objective   Objective     Vitals:   Temp:  [98.1 °F (36.7 °C)-98.4 °F (36.9 °C)] 98.4 °F (36.9 °C)  Heart Rate:  [72-80] 77  Resp:  [18] 18  BP: (121-147)/(65-92) 143/65  Physical Exam    Constitutional: Awake, alert   Eyes: PERRLA, sclerae anicteric, no conjunctival injection   HENT: NCAT, mucous membranes moist   Neck: Supple, no thyromegaly, no lymphadenopathy, trachea midline   Respiratory: Clear to auscultation bilaterally, nonlabored respirations    Cardiovascular: RRR, no murmurs, rubs, or gallops, palpable pedal pulses bilaterally   Gastrointestinal: Positive bowel sounds, soft, nontender, nondistended   Musculoskeletal: No bilateral ankle edema, no clubbing or cyanosis to extremities   Psychiatric: Appropriate affect, cooperative   Neurologic: Oriented x 3, strength symmetric in all extremities, Cranial Nerves grossly intact to confrontation, speech clear   Skin: No rashes -, bruising and swelling noted around both eyes, left greater than right.  Bruising and swelling noted over bridge of nose, and chin.  Large area of bruising noted on the medial aspect of the left upper arm.    Result Review    Result Review:  I have personally reviewed the results from the time of this admission to 10/20/2023 19:29 EDT and agree with these findings:  [x]  Laboratory list / accordion  []  Microbiology  [x]  Radiology  [x]  EKG/Telemetry    []  Cardiology/Vascular   []  Pathology  [x]  Old records  []  Other:      Assessment & Plan   Assessment / Plan     Brief Patient Summary:  Karena Perez is a 73 y.o. female who was admitted to the observation unit for further evaluation and treatment of her syncopal episode    Active Hospital Problems:  Active Hospital Problems    Diagnosis     **Syncope      Plan:   Syncope  -Cardiac monitoring  -Vital signs every 4 hours  -Cardiology consult  -High Sensitivity Troponin 9,  -EKG sinus rhythm  -N.p.o. after midnight   -Echocardiogram in a.m.    GERD, Coker's esophagus  -Continue Protonix    DVT prophylaxis:  Mechanical DVT prophylaxis orders are present.    CODE STATUS:       Admission Status:  I believe this patient meets observation status.    78 minutes have been spent by [facility] providers in the care of this patient while under observation status.      Appropriate PPE worn during patient encounter.  Hand hygeine performed before and after seeing the patient.      Electronically signed by CHEIKH Olvera, 10/20/23, 7:29 PM EDT.

## 2023-10-20 NOTE — CODE DOCUMENTATION
"[place] loud \"thump\" from hallway followed by loud yelling calling for help. Someone then ran onto CICU yelling there's a person down in the hallway, call for help. I then ran out to help. Found patient surrounded by people. The daughters at her side state she fell unprovoked and they did not see the fall. They state she fell face first. Upon my arrival patient had already been turned on her back and had large goose-egg bruise/hematoma growing around left eye socket, nose, and chin. She was moving all extremities at this time. Also able to speak. Repeatedly asking where her daughters are, where her belongings are, and what happened. BP obtained while flat 160/82. Pt unable to explain why she fell or remember preceding events. States she takes aspirin at home.     Upon arrival of additional help, pt log rolled to back board. Neck secured by staff during this move. Then lifted to stretcher. Delivered to Emergency room accompanied by myself and Kimi Whitten RN.   "

## 2023-10-21 ENCOUNTER — READMISSION MANAGEMENT (OUTPATIENT)
Dept: CALL CENTER | Facility: HOSPITAL | Age: 73
End: 2023-10-21
Payer: MEDICARE

## 2023-10-21 ENCOUNTER — APPOINTMENT (OUTPATIENT)
Dept: CARDIOLOGY | Facility: HOSPITAL | Age: 73
End: 2023-10-21
Payer: MEDICARE

## 2023-10-21 VITALS
BODY MASS INDEX: 21.62 KG/M2 | DIASTOLIC BLOOD PRESSURE: 68 MMHG | RESPIRATION RATE: 16 BRPM | HEART RATE: 70 BPM | OXYGEN SATURATION: 97 % | TEMPERATURE: 98.2 F | HEIGHT: 63 IN | SYSTOLIC BLOOD PRESSURE: 120 MMHG | WEIGHT: 122 LBS

## 2023-10-21 LAB
ANION GAP SERPL CALCULATED.3IONS-SCNC: 7 MMOL/L (ref 5–15)
AORTIC ARCH: 2.4 CM
AORTIC DIMENSIONLESS INDEX: 0.6 (DI)
BASOPHILS # BLD AUTO: 0.02 10*3/MM3 (ref 0–0.2)
BASOPHILS NFR BLD AUTO: 0.2 % (ref 0–1.5)
BH CV ECHO MEAS - ACS: 1.97 CM
BH CV ECHO MEAS - AI P1/2T: 710.3 MSEC
BH CV ECHO MEAS - AO MAX PG: 5.1 MMHG
BH CV ECHO MEAS - AO MEAN PG: 3 MMHG
BH CV ECHO MEAS - AO ROOT DIAM: 3.5 CM
BH CV ECHO MEAS - AO V2 MAX: 113 CM/SEC
BH CV ECHO MEAS - AO V2 VTI: 23.5 CM
BH CV ECHO MEAS - AVA(I,D): 1.59 CM2
BH CV ECHO MEAS - EDV(CUBED): 35.9 ML
BH CV ECHO MEAS - EDV(MOD-SP2): 59 ML
BH CV ECHO MEAS - EDV(MOD-SP4): 70 ML
BH CV ECHO MEAS - EF(MOD-BP): 58.4 %
BH CV ECHO MEAS - EF(MOD-SP2): 52.5 %
BH CV ECHO MEAS - EF(MOD-SP4): 62.9 %
BH CV ECHO MEAS - ESV(CUBED): 12.1 ML
BH CV ECHO MEAS - ESV(MOD-SP2): 28 ML
BH CV ECHO MEAS - ESV(MOD-SP4): 26 ML
BH CV ECHO MEAS - FS: 30.4 %
BH CV ECHO MEAS - IVS/LVPW: 1.11 CM
BH CV ECHO MEAS - IVSD: 1 CM
BH CV ECHO MEAS - LAT PEAK E' VEL: 8.4 CM/SEC
BH CV ECHO MEAS - LV DIASTOLIC VOL/BSA (35-75): 44.7 CM2
BH CV ECHO MEAS - LV MASS(C)D: 87.7 GRAMS
BH CV ECHO MEAS - LV MAX PG: 1.9 MMHG
BH CV ECHO MEAS - LV MEAN PG: 1 MMHG
BH CV ECHO MEAS - LV SYSTOLIC VOL/BSA (12-30): 16.6 CM2
BH CV ECHO MEAS - LV V1 MAX: 69 CM/SEC
BH CV ECHO MEAS - LV V1 VTI: 14.1 CM
BH CV ECHO MEAS - LVIDD: 3.3 CM
BH CV ECHO MEAS - LVIDS: 2.3 CM
BH CV ECHO MEAS - LVOT AREA: 2.7 CM2
BH CV ECHO MEAS - LVOT DIAM: 1.84 CM
BH CV ECHO MEAS - LVPWD: 0.9 CM
BH CV ECHO MEAS - MED PEAK E' VEL: 7.4 CM/SEC
BH CV ECHO MEAS - MR MAX PG: 39.7 MMHG
BH CV ECHO MEAS - MR MAX VEL: 315.2 CM/SEC
BH CV ECHO MEAS - MV A DUR: 0.13 SEC
BH CV ECHO MEAS - MV A MAX VEL: 75 CM/SEC
BH CV ECHO MEAS - MV DEC SLOPE: 275.5 CM/SEC2
BH CV ECHO MEAS - MV DEC TIME: 0.26 SEC
BH CV ECHO MEAS - MV E MAX VEL: 66.4 CM/SEC
BH CV ECHO MEAS - MV E/A: 0.89
BH CV ECHO MEAS - MV MAX PG: 5.7 MMHG
BH CV ECHO MEAS - MV MEAN PG: 2.02 MMHG
BH CV ECHO MEAS - MV P1/2T: 110.3 MSEC
BH CV ECHO MEAS - MV V2 VTI: 28.4 CM
BH CV ECHO MEAS - MVA(P1/2T): 1.99 CM2
BH CV ECHO MEAS - MVA(VTI): 1.32 CM2
BH CV ECHO MEAS - PA ACC TIME: 0.12 SEC
BH CV ECHO MEAS - PA V2 MAX: 82.5 CM/SEC
BH CV ECHO MEAS - PULM A REVS DUR: 0.14 SEC
BH CV ECHO MEAS - PULM A REVS VEL: 47.1 CM/SEC
BH CV ECHO MEAS - PULM DIAS VEL: 41.6 CM/SEC
BH CV ECHO MEAS - PULM S/D: 1.18
BH CV ECHO MEAS - PULM SYS VEL: 48.8 CM/SEC
BH CV ECHO MEAS - RAP SYSTOLE: 3 MMHG
BH CV ECHO MEAS - RV MAX PG: 1.36 MMHG
BH CV ECHO MEAS - RV V1 MAX: 58.3 CM/SEC
BH CV ECHO MEAS - RV V1 VTI: 12 CM
BH CV ECHO MEAS - RVSP: 29 MMHG
BH CV ECHO MEAS - SI(MOD-SP2): 19.8 ML/M2
BH CV ECHO MEAS - SI(MOD-SP4): 28.1 ML/M2
BH CV ECHO MEAS - SV(LVOT): 37.4 ML
BH CV ECHO MEAS - SV(MOD-SP2): 31 ML
BH CV ECHO MEAS - SV(MOD-SP4): 44 ML
BH CV ECHO MEAS - TAPSE (>1.6): 2.4 CM
BH CV ECHO MEAS - TR MAX PG: 25.7 MMHG
BH CV ECHO MEAS - TR MAX VEL: 253.4 CM/SEC
BH CV ECHO MEASUREMENTS AVERAGE E/E' RATIO: 8.41
BH CV XLRA - RV BASE: 3 CM
BH CV XLRA - RV LENGTH: 5.3 CM
BH CV XLRA - RV MID: 2.7 CM
BH CV XLRA - TDI S': 13.3 CM/SEC
BUN SERPL-MCNC: 10 MG/DL (ref 8–23)
BUN/CREAT SERPL: 14.3 (ref 7–25)
CALCIUM SPEC-SCNC: 8.7 MG/DL (ref 8.6–10.5)
CHLORIDE SERPL-SCNC: 104 MMOL/L (ref 98–107)
CO2 SERPL-SCNC: 26 MMOL/L (ref 22–29)
CREAT SERPL-MCNC: 0.7 MG/DL (ref 0.57–1)
DEPRECATED RDW RBC AUTO: 39.9 FL (ref 37–54)
EGFRCR SERPLBLD CKD-EPI 2021: 90.3 ML/MIN/1.73
EOSINOPHIL # BLD AUTO: 0.02 10*3/MM3 (ref 0–0.4)
EOSINOPHIL NFR BLD AUTO: 0.2 % (ref 0.3–6.2)
ERYTHROCYTE [DISTWIDTH] IN BLOOD BY AUTOMATED COUNT: 12.4 % (ref 12.3–15.4)
GLUCOSE SERPL-MCNC: 103 MG/DL (ref 65–99)
HCT VFR BLD AUTO: 42.5 % (ref 34–46.6)
HGB BLD-MCNC: 13.8 G/DL (ref 12–15.9)
IMM GRANULOCYTES # BLD AUTO: 0.04 10*3/MM3 (ref 0–0.05)
IMM GRANULOCYTES NFR BLD AUTO: 0.5 % (ref 0–0.5)
LEFT ATRIUM VOLUME INDEX: 18.2 ML/M2
LYMPHOCYTES # BLD AUTO: 1.3 10*3/MM3 (ref 0.7–3.1)
LYMPHOCYTES NFR BLD AUTO: 14.8 % (ref 19.6–45.3)
MCH RBC QN AUTO: 28.3 PG (ref 26.6–33)
MCHC RBC AUTO-ENTMCNC: 32.5 G/DL (ref 31.5–35.7)
MCV RBC AUTO: 87.1 FL (ref 79–97)
MONOCYTES # BLD AUTO: 0.94 10*3/MM3 (ref 0.1–0.9)
MONOCYTES NFR BLD AUTO: 10.7 % (ref 5–12)
NEUTROPHILS NFR BLD AUTO: 6.48 10*3/MM3 (ref 1.7–7)
NEUTROPHILS NFR BLD AUTO: 73.6 % (ref 42.7–76)
NRBC BLD AUTO-RTO: 0 /100 WBC (ref 0–0.2)
PLATELET # BLD AUTO: 291 10*3/MM3 (ref 140–450)
PMV BLD AUTO: 9 FL (ref 6–12)
POTASSIUM SERPL-SCNC: 4.4 MMOL/L (ref 3.5–5.2)
RBC # BLD AUTO: 4.88 10*6/MM3 (ref 3.77–5.28)
SINUS: 3.1 CM
SODIUM SERPL-SCNC: 137 MMOL/L (ref 136–145)
STJ: 2.44 CM
TROPONIN T SERPL HS-MCNC: 12 NG/L
WBC NRBC COR # BLD: 8.8 10*3/MM3 (ref 3.4–10.8)

## 2023-10-21 PROCEDURE — 80048 BASIC METABOLIC PNL TOTAL CA: CPT | Performed by: NURSE PRACTITIONER

## 2023-10-21 PROCEDURE — G0378 HOSPITAL OBSERVATION PER HR: HCPCS

## 2023-10-21 PROCEDURE — 85025 COMPLETE CBC W/AUTO DIFF WBC: CPT | Performed by: NURSE PRACTITIONER

## 2023-10-21 PROCEDURE — 99204 OFFICE O/P NEW MOD 45 MIN: CPT | Performed by: INTERNAL MEDICINE

## 2023-10-21 PROCEDURE — 84484 ASSAY OF TROPONIN QUANT: CPT | Performed by: NURSE PRACTITIONER

## 2023-10-21 PROCEDURE — 93246 EXT ECG>7D<15D RECORDING: CPT

## 2023-10-21 PROCEDURE — 25010000002 KETOROLAC TROMETHAMINE PER 15 MG: Performed by: NURSE PRACTITIONER

## 2023-10-21 PROCEDURE — 93306 TTE W/DOPPLER COMPLETE: CPT

## 2023-10-21 PROCEDURE — 93306 TTE W/DOPPLER COMPLETE: CPT | Performed by: INTERNAL MEDICINE

## 2023-10-21 PROCEDURE — 96375 TX/PRO/DX INJ NEW DRUG ADDON: CPT

## 2023-10-21 RX ORDER — KETOROLAC TROMETHAMINE 15 MG/ML
15 INJECTION, SOLUTION INTRAMUSCULAR; INTRAVENOUS ONCE
Status: DISCONTINUED | OUTPATIENT
Start: 2023-10-21 | End: 2023-10-21

## 2023-10-21 RX ORDER — CALCIUM CARBONATE 500 MG/1
2 TABLET, CHEWABLE ORAL 3 TIMES DAILY PRN
Status: DISCONTINUED | OUTPATIENT
Start: 2023-10-21 | End: 2023-10-21 | Stop reason: HOSPADM

## 2023-10-21 RX ORDER — KETOROLAC TROMETHAMINE 15 MG/ML
15 INJECTION, SOLUTION INTRAMUSCULAR; INTRAVENOUS EVERY 6 HOURS PRN
Status: DISCONTINUED | OUTPATIENT
Start: 2023-10-21 | End: 2023-10-21 | Stop reason: HOSPADM

## 2023-10-21 RX ADMIN — PANTOPRAZOLE SODIUM 40 MG: 40 TABLET, DELAYED RELEASE ORAL at 08:39

## 2023-10-21 RX ADMIN — KETOROLAC TROMETHAMINE 15 MG: 15 INJECTION, SOLUTION INTRAMUSCULAR; INTRAVENOUS at 08:44

## 2023-10-21 RX ADMIN — ANTACID TABLETS 2 TABLET: 500 TABLET, CHEWABLE ORAL at 04:08

## 2023-10-21 NOTE — PROGRESS NOTES
MD Attestation Note    I supervised care provided by the midlevel provider.    The EUNICE and I have discussed this patient's history, physical exam, and treatment plan. I have reviewed the documentation and personally had a face to face interaction with the patient  I affirm the documentation and agree with the treatment and plan.       Subjective:  Pt resting comfortably.  Multiple family members at bedside.  Pt says she feels much better than yesterday.  No new complaints     Objective:  Awake alert, no diaphoresis  Head: Extensive brusing to nasal, bilateral orbital soft tissues, and chin  Neck: supple,  Lungs: clear to auscultation bilaterally, no stridor  Cardiovascular: Regular rate and rhythm, no murmurs  Abdomen: Soft and nontender throughout  Psychiatric: Normal affect, normal insight    Assessment/ Plan:  Syncope: Echocardiogram completed -Cardiology consulted.  Possible d/c with Zio patch  Fall: PT consulted for gait assessment  Nasal Fracture: Continue supportive care for facial injuries

## 2023-10-21 NOTE — OUTREACH NOTE
Prep Survey      Flowsheet Row Responses   Alevism facility patient discharged from? [facility]   Is LACE score < 7 ? Yes   Eligibility [facility]   Date of Admission 10/20/23   Date of Discharge 10/21/23   Discharge Disposition Home or Self Care   Discharge diagnosis Syncope   Does the patient have one of the following disease processes/diagnoses(primary or secondary)? Other   Does the patient have Home health ordered? No   Is there a DME ordered? No   Prep survey completed? Yes            Billie CHRISTIANSON - Registered Nurse

## 2023-10-21 NOTE — PROGRESS NOTES
ED OBSERVATION PROGRESS/DISCHARGE SUMMARY    Date of Admission: 10/20/2023   LOS: 0 days   PCP: Monster Hodges MD    Final Diagnosis Syncope, nasal bone fracture      Subjective     Hospital Outcome:   Pleasant afebrile ambulatory 73-year-old female admitted to the ED observation unit for syncopal event that occurred yesterday.  She struck her face causing a nasal bone fracture and this morning has multiple ecchymoses and is feeling somewhat sore.    Her echocardiogram shows ejection fraction of 56 to 60% with mild aortic insufficiency.  She was seen and evaluated Dr. Brown with cardiology service and cleared for discharge home with Holter monitoring.  Patient and family are agreeable to plan.    ROS:  General: no fevers, chills  Respiratory: no cough, dyspnea  Cardiovascular: no chest pain, palpitations  Abdomen: No abdominal pain, nausea, vomiting, or diarrhea  Neurologic: No focal weakness    Objective   Physical Exam:  I have reviewed the vital signs.  Temp:  [97.7 °F (36.5 °C)-98.4 °F (36.9 °C)] 98.2 °F (36.8 °C)  Heart Rate:  [] 70  Resp:  [16-18] 16  BP: (105-147)/(58-92) 120/68  General Appearance:    Alert, cooperative, no distress  Head:  Multiple facial ecchymoses present, no epistaxis  Eyes:    Sclerae anicteric  Neck:   Supple, no mass  Lungs: Clear to auscultation bilaterally, respirations unlabored  Heart: Regular rate and rhythm, S1 and S2 normal, no murmur, rub or gallop  Abdomen:  Soft, non-tender, bowel sounds active, nondistended  Extremities: No clubbing, cyanosis, or edema to lower extremities  Pulses:  2+ and symmetric in distal lower extremities  Skin: No rashes   Neurologic: Oriented x3, Normal strength to extremities    Results Review:    I have reviewed the labs, radiology results and diagnostic studies.    Results from last 7 days   Lab Units 10/21/23  0419   WBC 10*3/mm3 8.80   HEMOGLOBIN g/dL 13.8   HEMATOCRIT % 42.5   PLATELETS 10*3/mm3 291     Results from last 7 days    Lab Units 10/21/23  0419 10/20/23  1646   SODIUM mmol/L 137 135*   POTASSIUM mmol/L 4.4 4.8   CHLORIDE mmol/L 104 101   CO2 mmol/L 26.0 20.3*   BUN mg/dL 10 11   CREATININE mg/dL 0.70 0.85   CALCIUM mg/dL 8.7 9.5   BILIRUBIN mg/dL  --  0.3   ALK PHOS U/L  --  99   ALT (SGPT) U/L  --  14   AST (SGOT) U/L  --  24   GLUCOSE mg/dL 103* 86     Imaging Results (Last 24 Hours)       Procedure Component Value Units Date/Time    CT Head Without Contrast [[id number]] Collected: 10/20/23 1804     Updated: 10/20/23 1811    Narrative:      HISTORY: Fell, head injury. Facial bone injury. Neck pain. Neck injury.     CT OF THE BRAIN WITHOUT CONTRAST     Axial images were obtained through the brain without intravenous  contrast.     There is mild-to-moderate diffuse atrophy. There is mild decreased  attenuation of the periventricular white matter bilaterally consistent  with mild small vessel white matter ischemic disease.     There is no evidence of acute infarction, hemorrhage, midline shift or  mass effect. There is a scalp hematoma over the left frontal region.  There is a slightly depressed fracture of the anterior and anterior  leftward nasal bones. No other skull fractures are seen.       Impression:      1. No acute intracranial process.  2. Nasal bone fracture.  3. Frontal scalp hematoma.     CT OF THE FACIAL BONES WITHOUT CONTRAST     Axial images were obtained through the facial bones without contrast.  Sagittal and coronal reconstruction images were reviewed.     There is a mildly displaced and depressed nasal bone fracture. No other  facial bone fractures are seen. There is a round probable mucus  retention cyst in the left maxillary sinus measuring 1.7 cm. Remainder  of the sinuses appear clear. Intraorbital contents appear within normal  limits. Soft tissue hematoma over the nose and midline and leftward  frontal regions is seen.     IMPRESSION:  1. Mildly displaced and depressed nasal bone fracture.  2. Mucous  retention cyst in the left maxillary sinus.  3. Anterior nasal and left frontal scalp hematomas.     CT OF THE CERVICAL SPINE WITHOUT CONTRAST     Axial images were obtained from the skull base to the upper thoracic  spine. Sagittal and coronal reconstruction images were reviewed.     There is degenerative disease of the C1-C2 articulation. There is C3-C4  disc space narrowing with mild spondylosis. There is mild  anterolisthesis of C4 on C5 with some disc space narrowing.  Mild-to-moderate spondylosis of C5-c6 and mild C6-c7 spondylosis is  seen. No other significant subluxation is seen. There is multilevel  facet joint disease. No fractures are seen. There is bilateral carotid  calcification.     IMPRESSION:  1. Multilevel cervical degenerative disease.  2. No fractures are seen.     Radiation dose reduction techniques were utilized, including automated  exposure control and exposure modulation based on body size.        This report was finalized on 10/20/2023 6:08 PM by Dr. Brayden Vaca M.D on Workstation: [id number]       CT Cervical Spine Without Contrast [[id number]] Collected: 10/20/23 1804     Updated: 10/20/23 1811    Narrative:      HISTORY: Fell, head injury. Facial bone injury. Neck pain. Neck injury.     CT OF THE BRAIN WITHOUT CONTRAST     Axial images were obtained through the brain without intravenous  contrast.     There is mild-to-moderate diffuse atrophy. There is mild decreased  attenuation of the periventricular white matter bilaterally consistent  with mild small vessel white matter ischemic disease.     There is no evidence of acute infarction, hemorrhage, midline shift or  mass effect. There is a scalp hematoma over the left frontal region.  There is a slightly depressed fracture of the anterior and anterior  leftward nasal bones. No other skull fractures are seen.       Impression:      1. No acute intracranial process.  2. Nasal bone fracture.  3. Frontal scalp hematoma.     CT OF THE  FACIAL BONES WITHOUT CONTRAST     Axial images were obtained through the facial bones without contrast.  Sagittal and coronal reconstruction images were reviewed.     There is a mildly displaced and depressed nasal bone fracture. No other  facial bone fractures are seen. There is a round probable mucus  retention cyst in the left maxillary sinus measuring 1.7 cm. Remainder  of the sinuses appear clear. Intraorbital contents appear within normal  limits. Soft tissue hematoma over the nose and midline and leftward  frontal regions is seen.     IMPRESSION:  1. Mildly displaced and depressed nasal bone fracture.  2. Mucous retention cyst in the left maxillary sinus.  3. Anterior nasal and left frontal scalp hematomas.     CT OF THE CERVICAL SPINE WITHOUT CONTRAST     Axial images were obtained from the skull base to the upper thoracic  spine. Sagittal and coronal reconstruction images were reviewed.     There is degenerative disease of the C1-C2 articulation. There is C3-C4  disc space narrowing with mild spondylosis. There is mild  anterolisthesis of C4 on C5 with some disc space narrowing.  Mild-to-moderate spondylosis of C5-c6 and mild C6-c7 spondylosis is  seen. No other significant subluxation is seen. There is multilevel  facet joint disease. No fractures are seen. There is bilateral carotid  calcification.     IMPRESSION:  1. Multilevel cervical degenerative disease.  2. No fractures are seen.     Radiation dose reduction techniques were utilized, including automated  exposure control and exposure modulation based on body size.        This report was finalized on 10/20/2023 6:08 PM by Dr. Brayden Vaca M.D on Workstation: [id number]       CT Facial Bones Without Contrast [[id number]] Collected: 10/20/23 1804     Updated: 10/20/23 1811    Narrative:      HISTORY: Fell, head injury. Facial bone injury. Neck pain. Neck injury.     CT OF THE BRAIN WITHOUT CONTRAST     Axial images were obtained through the brain  without intravenous  contrast.     There is mild-to-moderate diffuse atrophy. There is mild decreased  attenuation of the periventricular white matter bilaterally consistent  with mild small vessel white matter ischemic disease.     There is no evidence of acute infarction, hemorrhage, midline shift or  mass effect. There is a scalp hematoma over the left frontal region.  There is a slightly depressed fracture of the anterior and anterior  leftward nasal bones. No other skull fractures are seen.       Impression:      1. No acute intracranial process.  2. Nasal bone fracture.  3. Frontal scalp hematoma.     CT OF THE FACIAL BONES WITHOUT CONTRAST     Axial images were obtained through the facial bones without contrast.  Sagittal and coronal reconstruction images were reviewed.     There is a mildly displaced and depressed nasal bone fracture. No other  facial bone fractures are seen. There is a round probable mucus  retention cyst in the left maxillary sinus measuring 1.7 cm. Remainder  of the sinuses appear clear. Intraorbital contents appear within normal  limits. Soft tissue hematoma over the nose and midline and leftward  frontal regions is seen.     IMPRESSION:  1. Mildly displaced and depressed nasal bone fracture.  2. Mucous retention cyst in the left maxillary sinus.  3. Anterior nasal and left frontal scalp hematomas.     CT OF THE CERVICAL SPINE WITHOUT CONTRAST     Axial images were obtained from the skull base to the upper thoracic  spine. Sagittal and coronal reconstruction images were reviewed.     There is degenerative disease of the C1-C2 articulation. There is C3-C4  disc space narrowing with mild spondylosis. There is mild  anterolisthesis of C4 on C5 with some disc space narrowing.  Mild-to-moderate spondylosis of C5-c6 and mild C6-c7 spondylosis is  seen. No other significant subluxation is seen. There is multilevel  facet joint disease. No fractures are seen. There is bilateral  carotid  calcification.     IMPRESSION:  1. Multilevel cervical degenerative disease.  2. No fractures are seen.     Radiation dose reduction techniques were utilized, including automated  exposure control and exposure modulation based on body size.        This report was finalized on 10/20/2023 6:08 PM by Dr. Brayden Vaca M.D on Workstation: [id number]       XR Chest 1 View [[id number]] Collected: 10/20/23 1707     Updated: 10/20/23 1810    Narrative:      XR CHEST 1 VW-10/20/2023     HISTORY: Hypertension. Evaluate for possible pneumonia. Patient fell.     Heart size is within normal limits. Lungs are underinflated but appear  clear. Mild degenerative changes of the shoulders are seen.       Impression:      1. Underinflation of the lungs with no acute infiltrates.        This report was finalized on 10/20/2023 6:07 PM by Dr. Brayden Vaca M.D on Workstation: [id number]               I have reviewed the medications.  ---------------------------------------------------------------------------------------------  Assessment & Plan   Assessment/Problem List    Syncope      Plan:    Syncope  -Cardiac monitoring overnight did not reveal any arrhythmias  -Cardiology consult, cleared for discharge home  -High Sensitivity Troponin 9, 11, 12  -EKG sinus rhythm  -Echocardiogram shows EF 56 to 60% without evidence of pericardial effusion     GERD, Coker's esophagus  -Continue Protonix    Disposition: Home    Follow-up after Discharge: PCP & cardiology    This note will serve as a discharge summary.    Shavon Foster, APRN 10/21/23 08:33 EDT    I have worn appropriate PPE during this patient encounter, sanitized my hands both with entering and exiting patient's room.      35 minutes has been spent by [facility] providers in the care of this patient while under observation status

## 2023-10-21 NOTE — CONSULTS
Date of Hospital Visit: 10/20/2023  Date of consult: 10/21/2023  Encounter Provider: Stephane Luther MD  Place of Service: [facility]  Patient Name: Karena Perez  :1950  Referral Provider: No ref. provider found    Chief complaint: Syncope    Reason for consult: Syncope    History of Present Illness:  Karena Perez is a 74 yo female with a past medical history notable for hypertension, hyperlipidemia, migraines and vertebral artery stenosis who presented to the ED yesterday after experiencing a syncopal episode.  She was here visiting a family member when this occurred.  She was with her daughters who did not witness the fall, but they were there immediately after and state she fell on her face and was unresponsive for about 30 seconds. The emergency team was called and she was transferred to the ED.  Initial blood pressure was 160/82.      Work up was negative for fractures, CT of the  head was negative.  HS troponin's are very mildly elevated at 11, 12.  Other lab work is unremarkable.  EKG shows sinus rhythm.  Echocardiogram done today shows an EF of 56-60%, mild diastolic dysfunction.      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              ECHO 10-21-23    Left ventricular systolic function is normal. Left ventricular ejection fraction appears to be 56 - 60%.    Left ventricular diastolic function is consistent with (grade I) impaired relaxation.    Normal right ventricular cavity size and systolic function noted.    Mild to moderate aortic valve regurgitation is present.    Mild tricuspid valve regurgitation is present.    Calculated right ventricular systolic pressure from tricuspid regurgitation is 29 mmHg.    There is no evidence of pericardial effusion.      Past Medical History:   Diagnosis Date    Allergic     Anxiety     Arthritis     Cholelithiasis Prior to my 23th birthday    Colon polyp 16     I believe I had a polyp  during colonoscopy    Depression     GERD (gastroesophageal reflux disease)     Headache     Hyperlipidemia     Hypertension     Irritable bowel syndrome     Low back pain     Migraine     Pneumonia     Syncope 10/20/2023    Urinary tract infection     Visual impairment        Past Surgical History:   Procedure Laterality Date    APPENDECTOMY  1968    Removed along w/gall bladder    BREAST BIOPSY      CHOLECYSTECTOMY      COLONOSCOPY  09/22/2015    HP polyp, IH.    COLONOSCOPY  12/30/2011    EH, IH    COLONOSCOPY N/A 09/11/2018    Procedure: COLONOSCOPY WITH POLYPECTOMY (COLD BX);  Surgeon: Jona Villavicencio MD;  Location:  [facility];  Service: Gastroenterology    COLONOSCOPY N/A 03/03/2023    Procedure: COLONOSCOPY to cecum with polypectomy;  Surgeon: Jona Villavicencio MD;  Location[facility];  Service: Gastroenterology;  Laterality: N/A;  pre-hx of colon polyp, family hx of colon ca  post-diverticulosis, polyps, hemorrhoids    ENDOSCOPY N/A 09/11/2018    Procedure: ESOPHAGOGASTRODUODENOSCOPY WITH BIOPSIES PARSONS DILATION;  Surgeon: Jona Villavicencio MD;  Location: [facility] ENDOSCOPY;  Service: Gastroenterology    ENDOSCOPY N/A 03/03/2023    Procedure: ESOPHAGOGASTRODUODENOSCOPY with biopsy;  Surgeon: Jona Villavicencio MD;  Location: [facility] ENDOSCOPY;  Service: Gastroenterology;  Laterality: N/A;  pre-dyspepsia  post-gastritis    EYE SURGERY      Vision correction    TONSILLECTOMY  1964    TUBAL ABDOMINAL LIGATION  1968       Medications Prior to Admission   Medication Sig Dispense Refill Last Dose    aspirin 81 MG EC tablet Take 1 tablet by mouth Every Morning.   10/20/2023    Biotin 10 MG tablet Take 10 mg by mouth Every Morning.   10/20/2023    buPROPion XL (WELLBUTRIN XL) 300 MG 24 hr tablet Take 1 tablet by mouth Daily. (Patient taking differently: Take 1 tablet by mouth Every Morning.) 90 tablet 1 10/20/2023    Cetirizine HCl 10 MG capsule Take 10 mg by mouth Every Morning.    10/20/2023    D-Mannose 500 MG capsule Take 4 capsules by mouth Every Morning.   10/20/2023    estradiol (ESTRACE) 0.1 MG/GM vaginal cream Insert 0.001 g into the vagina Daily.       metoclopramide (REGLAN) 10 MG tablet Take 1 tablet by mouth As Needed (pain). Esophageal spasm (max dose: 30mg per day) 30 tablet 0 10/20/2023    omeprazole (priLOSEC) 20 MG capsule Take 1 capsule by mouth Every Morning.   10/20/2023    rosuvastatin (CRESTOR) 10 MG tablet Take 1 tablet by mouth Daily. (Patient taking differently: Take 1 tablet by mouth Every Morning.) 90 tablet 3 10/20/2023    spironolactone (ALDACTONE) 50 MG tablet Take 1.5 tablets by mouth Daily. (Patient taking differently: Take 1.5 tablets by mouth Every Morning.) 135 tablet 1 10/20/2023    verapamil ER (VERELAN) 240 MG 24 hr capsule Take 1 capsule by mouth Daily. (Patient taking differently: Take 1 capsule by mouth Every Morning.) 90 capsule 1 10/20/2023       Current Meds  Scheduled Meds:aspirin, 81 mg, Oral, QAM  buPROPion XL, 300 mg, Oral, QAM  pantoprazole, 40 mg, Oral, Q AM  rosuvastatin, 10 mg, Oral, QAM  senna-docusate sodium, 2 tablet, Oral, BID  sodium chloride, 10 mL, Intravenous, Q12H  spironolactone, 75 mg, Oral, Daily  verapamil ER, 240 mg, Oral, QAM      Continuous Infusions:sodium chloride, 75 mL/hr, Last Rate: 75 mL/hr (10/21/23 0406)      PRN Meds:.  senna-docusate sodium **AND** polyethylene glycol **AND** bisacodyl **AND** bisacodyl    calcium carbonate    ketorolac    metoclopramide    [COMPLETED] Insert Peripheral IV **AND** sodium chloride    sodium chloride    sodium chloride    Allergies as of 10/20/2023 - Reviewed 10/20/2023   Allergen Reaction Noted    Codeine Unknown - Low Severity 03/21/2016    Sulfamethoxazole-trimethoprim  03/21/2016       Social History     Socioeconomic History    Marital status:     Number of children: 3   Tobacco Use    Smoking status: Former     Packs/day: 0.50     Years: 4.00     Additional pack years:  "0.00     Total pack years: 2.00     Types: Cigarettes     Quit date: 1990     Years since quittin.8    Smokeless tobacco: Never    Tobacco comments:     Smoking in social settings   Vaping Use    Vaping Use: Never used   Substance and Sexual Activity    Alcohol use: Not Currently     Comment: Social    Drug use: No    Sexual activity: Not Currently     Partners: Male     Birth control/protection: Post-menopausal       Family History   Problem Relation Age of Onset    Alzheimer's disease Mother     Hypertension Mother     Hyperlipidemia Mother     Mental illness Mother         Alzheimer's    Stroke Mother     Dementia Mother     Colon cancer Father     Hypertension Father     Asthma Father     Cancer Father         Father colon cancer, daughter Ovarian, sister ovarian    Hearing loss Father     Hyperlipidemia Father     Ovarian cancer Sister     Ovarian cancer Daughter        REVIEW OF SYSTEMS:   All systems reviewed and pertinent positives include in HPI otherwise negative review of systems.       Objective:   Temp:  [97.7 °F (36.5 °C)-98.4 °F (36.9 °C)] 98.2 °F (36.8 °C)  Heart Rate:  [] 70  Resp:  [16-18] 16  BP: (105-147)/(58-92) 120/68  Body mass index is 21.61 kg/m².  Flowsheet Rows      Flowsheet Row First Filed Value   Admission Height 162.6 cm (64\") Documented at 10/20/2023 1511   Admission Weight 55.8 kg (123 lb) Documented at 10/20/2023 1511          Vitals:    10/21/23 0723   BP: 120/68   Pulse: 70   Resp:    Temp:    SpO2:        General Appearance:    Alert, cooperative, in no acute distress   Head:    Normocephalic, without obvious abnormality, atraumatic   Eyes:            Lids and lashes normal, conjunctivae and sclerae normal, no   icterus, no pallor, corneas clear, PERRLA   Ears:    Ears appear intact with no abnormalities noted   Throat:   No oral lesions, no thrush, oral mucosa moist   Neck:   No adenopathy, supple, trachea midline, no thyromegaly, no   carotid bruit, no JVD "   Back:     No kyphosis present, no scoliosis present, no skin lesions, erythema or scars, no tenderness to percussion or palpation, range of motion normal   Lungs:     Clear to auscultation,respirations regular, even and unlabored    Heart:    Regular rhythm and normal rate, normal S1 and S2, no murmur, no gallop, no rub, no click   Chest Wall:    No abnormalities observed   Abdomen:     Normal bowel sounds, no masses, no organomegaly, soft nontender, nondistended, no guarding, no rebound  tenderness   Extremities:   Moves all extremities well, no edema, no cyanosis, no redness   Pulses:   Pulses palpable and equal bilaterally. Normal radial, carotid, femoral, dorsalis pedis and posterior tibial pulses bilaterally. Normal abdominal aorta   Skin:  Neurology:   Psychiatric:   No bleeding, bruising or rash   Normal speech and cranial nerve exam, no focal deficit   Alert and oriented x 3, normal mood and affect             Review of Data:      Results from last 7 days   Lab Units 10/21/23  0419 10/20/23  1646   SODIUM mmol/L 137 135*   POTASSIUM mmol/L 4.4 4.8   CHLORIDE mmol/L 104 101   CO2 mmol/L 26.0 20.3*   BUN mg/dL 10 11   CREATININE mg/dL 0.70 0.85   CALCIUM mg/dL 8.7 9.5   BILIRUBIN mg/dL  --  0.3   ALK PHOS U/L  --  99   ALT (SGPT) U/L  --  14   AST (SGOT) U/L  --  24   GLUCOSE mg/dL 103* 86     Results from last 7 days   Lab Units 10/21/23  0419 10/20/23  1943 10/20/23  1646   HSTROP T ng/L 12* 11* 9     @LABRCNTbnp@  Results from last 7 days   Lab Units 10/21/23  0419 10/20/23  1646   WBC 10*3/mm3 8.80 9.48   HEMOGLOBIN g/dL 13.8 14.7   HEMATOCRIT % 42.5 44.1   PLATELETS 10*3/mm3 291 307             @LABRCNTIP(chol,trig,hdl,ldl)          10-20-23      10-20-18      I personally viewed and interpreted the patient's EKG/Telemetry data  )   Syncope        Assessment and Plan:      Mrs. Perez looks like she had a brief syncopal episode followed by fall and subsequent concussion.  She has facial trauma with  bruises.  She thinks she was dehydrated yesterday morning but denies having any new symptoms yesterday.  No palpitations, change in her breathing, chest pain, focal neurologic deficits, tongue bite, bladder or bowel incontinence.  ER work-up was unremarkable for etiology.  Borderline elevated troponin and unremarkable EKG. at baseline she is fairly active and denies any exertional symptoms.  Likely syncopal episode followed by fall.  Normal orthostatic vital signs.  Echocardiogram with mild aortic valve regurgitation otherwise unremarkable.  No evidence for arrhythmia after admission.   Essential hypertension on spironolactone.  Takes verapamil for migraine.  Vertebral artery stenosis on aspirin statin.    She is hemodynamically stable and feels better this morning.  Encourage daily adequate hydration  DC patient on 2 weeks Zio.     I have discussed patient with Dr. Boston and Shavon Luther MD  10/21/23  10:15 EDT.      Time spent in reviewing chart, discussion and examination:

## 2023-10-21 NOTE — PLAN OF CARE
Goal Outcome Evaluation: Pt without dizziness or syncopal episode during admission.  Zio patch in place.  Pt VU of importance outpt follow up with cardiology and of cold therapy on facial swelling and bruising over the next few days.

## 2023-10-23 ENCOUNTER — TRANSITIONAL CARE MANAGEMENT TELEPHONE ENCOUNTER (OUTPATIENT)
Dept: CALL CENTER | Facility: HOSPITAL | Age: 73
End: 2023-10-23
Payer: MEDICARE

## 2023-10-23 ENCOUNTER — TELEPHONE (OUTPATIENT)
Dept: NEUROLOGY | Facility: CLINIC | Age: 73
End: 2023-10-23

## 2023-10-23 NOTE — TELEPHONE ENCOUNTER
Sent [employer] message to patient advising contacting her PCP for a referral for cardiology- or she may contact us for appt with  Dr. Bong Schmid, who would refer her.

## 2023-10-23 NOTE — OUTREACH NOTE
Call Center TCM Note      Flowsheet Row Responses   [facility] patient discharged from? [place]   Does the patient have one of the following disease processes/diagnoses(primary or secondary)? Other   TCM attempt successful? Yes   Call start time 1653   Call end time 1659   Meds reviewed with patient/caregiver? Yes   Is the patient having any side effects they believe may be caused by any medication additions or changes? No   Does the patient have all medications ordered at discharge? N/A   Is the patient taking all medications as directed (includes completed medication regime)? Yes   Comments Office visit with PCP 11/02/23-will route to PCP office for review. ENT appt 10/24/23. [facility] will make one month cardiology appt.   Does the patient have an appointment with their PCP within 7-14 days of discharge? Yes   Has home health visited the patient within 72 hours of discharge? N/A   DME comments Has ZIO patch.   Psychosocial issues? No   Did the patient receive a copy of their discharge instructions? Yes   Nursing interventions Reviewed instructions with patient   What is the patient's perception of their health status since discharge? Improving   Is the patient/caregiver able to teach back signs and symptoms related to disease process for when to call PCP? Yes   Is the patient/caregiver able to teach back signs and symptoms related to disease process for when to call 911? Yes   Is the patient/caregiver able to teach back the hierarchy of who to call/visit for symptoms/problems? PCP, Specialist, Home health nurse, Urgent Care, ED, 911 Yes   If the patient is a current smoker, are they able to teach back resources for cessation? Not a smoker   TCM call completed? Yes   Wrap up additional comments Patient states is improving. States has facial bruising and nasal fx, and will see ENT tomorrow. Using ice to bruising. States ZIO patch in place. Denies any needs or concerns today. TCM complete.   Call end time 1659    Would this patient benefit from a Referral to [facility] Social Work? No   Is the patient interested in additional calls from an ambulatory ? No            Silvina Houston RN    10/23/2023, 17:01 EDT

## 2023-10-23 NOTE — TELEPHONE ENCOUNTER
Caller: Chris Karena    Relationship to patient: Self    Best call back number: [phone]    New or established patient?  [] New  [x] Established    Date of discharge: 10/21/23    Facility discharged from: [facility]    Diagnosis/Symptoms: SYNCOPE    Length of stay (If applicable): 20 HOURS    Specialty Only: Did you see a [facility] provider?    [] Yes  [x] No  If so, who?     NOTES: FIRST INCIDENCE OF RECORDED SYNCOPE WHILE VISITING DAUGHTER AT [facility], SHE WAS WALKING DOWN THE HALLWAY AND LOST CONCIOUSNESS AND HIT FACE FIRST ON THE FLOOR. PATIENT WAS SEEN IN [facility] ED AND WAS KEPT OVERNIGHT FOR OBSERVATION. SCANS AND TESTS ARE IN CHART. PATIENT WANTS TO KNOW IF SHE NEEDS A SOONER FOLLOW UP WITH DR. BOWERS BECAUSE THIS IS A NEW CONDITION. PLEASE ADVISE. OK TO REPLY THROUGH MYCHART.

## 2023-10-23 NOTE — TELEPHONE ENCOUNTER
Do you see syncope?   Pt was seen at [facility] ED during a recent episode where she lost conciousness. She is an established Dr. Hudson patient for Migraines with a history of Stroke. She scheduled a hospital follow up with Dr. Hudson for July 2024- however, since this is a new problem was unsure if you wanted to see her?

## 2023-10-25 ENCOUNTER — TELEPHONE (OUTPATIENT)
Dept: CARDIOLOGY | Facility: CLINIC | Age: 73
End: 2023-10-25

## 2023-10-25 NOTE — TELEPHONE ENCOUNTER
Caller: Karena Perez    Relationship to patient: Self    Best call back number: [phone]    Type of visit: 1 MONTH HOSPITAL FOLLOW    Requested date: AROUND 11/21/23    Additional notes:DISCHARGE NOTES ASK FOR APPT AROUND 11/21/23. AND FIRST AVAILABLE IS MID DEC.  PATIENT HAS ZIP HEART MONITOR THAT SHE MAILS BACK 11/4/23.

## 2023-11-03 ENCOUNTER — OFFICE VISIT (OUTPATIENT)
Dept: INTERNAL MEDICINE | Facility: CLINIC | Age: 73
End: 2023-11-03
Payer: MEDICARE

## 2023-11-03 VITALS
SYSTOLIC BLOOD PRESSURE: 140 MMHG | WEIGHT: 122.8 LBS | DIASTOLIC BLOOD PRESSURE: 80 MMHG | HEART RATE: 72 BPM | OXYGEN SATURATION: 100 % | BODY MASS INDEX: 21.76 KG/M2 | TEMPERATURE: 97 F | HEIGHT: 63 IN

## 2023-11-03 DIAGNOSIS — R55 SYNCOPE, UNSPECIFIED SYNCOPE TYPE: ICD-10-CM

## 2023-11-03 DIAGNOSIS — I10 PRIMARY HYPERTENSION: Primary | ICD-10-CM

## 2023-11-03 PROCEDURE — 3079F DIAST BP 80-89 MM HG: CPT | Performed by: FAMILY MEDICINE

## 2023-11-03 PROCEDURE — 3077F SYST BP >= 140 MM HG: CPT | Performed by: FAMILY MEDICINE

## 2023-11-03 PROCEDURE — 99214 OFFICE O/P EST MOD 30 MIN: CPT | Performed by: FAMILY MEDICINE

## 2023-11-03 RX ORDER — SPIRONOLACTONE 50 MG/1
50 TABLET, FILM COATED ORAL DAILY
Start: 2023-11-03

## 2023-11-03 NOTE — PROGRESS NOTES
"Chief Complaint  Loss of Consciousness and Fall (ER follow up )    Subjective        Karena Perez presents to [facility] PRIMARY CARE  History of Present Illness  Follows up for ongoing management of hypertension and episode of syncope she presently is wearing an Zio patch she has not had any more symptoms she relates it mostly to some lightheadedness while she was walking and all of a sudden found herself on the ground she has quite a bit bruised face with minimally displaced nasal fracture  Objective   Vital Signs:  /80   Pulse 72   Temp 97 °F (36.1 °C)   Ht 160 cm (62.99\")   Wt 55.7 kg (122 lb 12.8 oz)   SpO2 100%   BMI 21.76 kg/m²   Estimated body mass index is 21.76 kg/m² as calculated from the following:    Height as of this encounter: 160 cm (62.99\").    Weight as of this encounter: 55.7 kg (122 lb 12.8 oz).       BMI is within normal parameters. No other follow-up for BMI required.      Physical Exam  HENT:      Head: Normocephalic.      Comments: Face contusions  Cardiovascular:      Rate and Rhythm: Normal rate and regular rhythm.      Pulses: Normal pulses.      Heart sounds: Normal heart sounds.   Pulmonary:      Effort: Pulmonary effort is normal.      Breath sounds: Normal breath sounds.   Neurological:      Mental Status: She is alert.   Psychiatric:         Mood and Affect: Mood normal.         Behavior: Behavior normal.         Thought Content: Thought content normal.         Judgment: Judgment normal.        Result Review :    Common labs          7/13/2023    09:18 10/20/2023    16:46 10/21/2023    04:19   Common Labs   Glucose 85  86  103    BUN 13  11  10    Creatinine 0.97  0.85  0.70    Sodium 137  135  137    Potassium 4.7  4.8  4.4    Chloride 99  101  104    Calcium 10.3  9.5  8.7    Total Protein 7.5      Albumin 5.1  4.4     Total Bilirubin 0.4  0.3     Alkaline Phosphatase 121  99     AST (SGOT) 21  24     ALT (SGPT) 18  14     WBC  9.48  8.80    Hemoglobin  " 14.7  13.8    Hematocrit  44.1  42.5    Platelets  307  291    Total Cholesterol 166      Triglycerides 69      HDL Cholesterol 55      LDL Cholesterol  98        Data reviewed : Radiologic studies CT head nasal fracture             Assessment and Plan   Diagnoses and all orders for this visit:    1. Primary hypertension (Primary)  -     spironolactone (ALDACTONE) 50 MG tablet; Take 1 tablet by mouth Daily.    2. Syncope, unspecified syncope type    Syncope follow-up results of Zio patch  Reduce spironolactone monitor blood pressure goals greater than 120 systolic less than 140       Follow Up   Return in about 1 month (around 12/3/2023), or if symptoms worsen or fail to improve, for Recheck.  Patient was given instructions and counseling regarding her condition or for health maintenance advice. Please see specific information pulled into the AVS if appropriate.         Answers submitted by the patient for this visit:  Other (Submitted on 11/2/2023)  Please describe your symptoms.: I had a syncope episode on October 20th and fell on my face while I was at [facility] with a family member.  I have a broken nose and heavy bruising. I spent the night in the Observation unit.  I had a CT scan shortly after the incident and an echocardiogram the next morning ordered by Dr Cho. I have a follow up appointment with Dr Lima at the end of November.  Have you had these symptoms before?: No  How long have you been having these symptoms?: 1-2 weeks  Please list any medications you are currently taking for this condition.: None  Please describe any probable cause for these symptoms. : I dont know why I fainted/became unconscious, prior to my fall. Perhaps because, I didn’t eat or drink water as I usually do and I was walking fast.  Primary Reason for Visit (Submitted on 11/2/2023)  What is the primary reason for your visit?: Other

## 2023-11-27 ENCOUNTER — TELEPHONE (OUTPATIENT)
Dept: INTERNAL MEDICINE | Facility: CLINIC | Age: 73
End: 2023-11-27
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
## 2023-11-29 ENCOUNTER — OFFICE VISIT (OUTPATIENT)
Dept: CARDIOLOGY | Facility: CLINIC | Age: 73
End: 2023-11-29
Payer: MEDICARE

## 2023-11-29 VITALS
HEART RATE: 83 BPM | WEIGHT: 126.2 LBS | HEIGHT: 63 IN | BODY MASS INDEX: 22.36 KG/M2 | DIASTOLIC BLOOD PRESSURE: 72 MMHG | OXYGEN SATURATION: 96 % | SYSTOLIC BLOOD PRESSURE: 108 MMHG

## 2023-11-29 DIAGNOSIS — I10 PRIMARY HYPERTENSION: Chronic | ICD-10-CM

## 2023-11-29 DIAGNOSIS — E78.2 MIXED HYPERLIPIDEMIA: ICD-10-CM

## 2023-11-29 DIAGNOSIS — R55 SYNCOPE AND COLLAPSE: Primary | ICD-10-CM

## 2023-11-29 RX ORDER — VERAPAMIL HYDROCHLORIDE 240 MG/1
240 CAPSULE, EXTENDED RELEASE ORAL DAILY
Start: 2023-11-29

## 2023-11-29 RX ORDER — VALSARTAN 80 MG/1
40 TABLET ORAL DAILY
Qty: 90 TABLET | Refills: 3 | Status: SHIPPED | OUTPATIENT
Start: 2023-11-29

## 2023-11-29 RX ORDER — DICLOFENAC SODIUM 1 MG/ML
SOLUTION/ DROPS OPHTHALMIC
COMMUNITY
Start: 2023-11-10

## 2023-11-29 RX ORDER — PREDNISOLONE ACETATE 10 MG/ML
SUSPENSION/ DROPS OPHTHALMIC
COMMUNITY
Start: 2023-11-10

## 2023-11-29 NOTE — PROGRESS NOTES
PATIENTINFORMATION    Date of Office Visit: 2023  Encounter Provider: Stephane Luther MD  Place of Service: [facility] CARDIOLOGY  Patient Name: Karena Perez  : 1950    Subjective:     Encounter Date:2023      Patient ID: Karena Perez is a 73 y.o. female.    Chief Complaint   Patient presents with    Hospital Follow Up Visit     10/20/23-syncope     HPI  Ms. Perez is a pleasant  years old lady who came to cardiology clinic for follow-up visit.  She is compliant with all current medications without significant side effects and denies any ER visit or hospitalization since last clinic visit.  She denies any rest or exertional chest pain, shortness of breath, orthopnea, PND, palpitations, presyncope syncope or extremity swelling.       No recurrence of syncope  She checks her blood pressure regularly at home and usually elevated when she is stressed otherwise fairly controlled.  But for the last several days after her email is hacked it has consistently been elevated.  Today in office is 160/80 mmHg.  Currently on verapamil (that she takes for migraine) and spironolactone for hypertension.    She denies any rest or exertional chest pain, shortness of breath, orthopnea, PND, palpitations, presyncope or syncope or extremity swelling.        ROS  All systems reviewed and negative except as noted in HPI.    Past Medical History:   Diagnosis Date    Allergic     Anxiety     Arthritis     Cholelithiasis Prior to my 23th birthday    Colon polyp 16    I believe I had a polyp  during colonoscopy    Depression     GERD (gastroesophageal reflux disease)     Headache     Hyperlipidemia     Hypertension     Irritable bowel syndrome     Low back pain     Migraine     Pneumonia     Syncope 10/20/2023    Urinary tract infection     Visual impairment        Past Surgical History:   Procedure Laterality Date    APPENDECTOMY      Removed along w/gall bladder    BREAST BIOPSY       CATARACT EXTRACTION Bilateral     CHOLECYSTECTOMY      COLONOSCOPY  2015    HP polyp, IH.    COLONOSCOPY  2011    EH, IH    COLONOSCOPY N/A 2018    Procedure: COLONOSCOPY WITH POLYPECTOMY (COLD BX);  Surgeon: Jona Villavicencio MD;  Location:  [facility];  Service: Gastroenterology    COLONOSCOPY N/A 2023    Procedure: COLONOSCOPY to cecum with polypectomy;  Surgeon: Jona Villavicencio MD;  Location:  [facility];  Service: Gastroenterology;  Laterality: N/A;  pre-hx of colon polyp, family hx of colon ca  post-diverticulosis, polyps, hemorrhoids    ENDOSCOPY N/A 2018    Procedure: ESOPHAGOGASTRODUODENOSCOPY WITH BIOPSIES PARSONS DILATION;  Surgeon: Jona Villavicencio MD;  Location:  [facility];  Service: Gastroenterology    ENDOSCOPY N/A 2023    Procedure: ESOPHAGOGASTRODUODENOSCOPY with biopsy;  Surgeon: Jona Villavicencio MD;  Location: [facility] END[facility]COPY;  Service: Gastroenterology;  Laterality: N/A;  pre-dyspepsia  post-gastritis    EYE SURGERY      Vision correction    TONSILLECTOMY  1964    TUBAL ABDOMINAL LIGATION         Social History     Socioeconomic History    Marital status:     Number of children: 3   Tobacco Use    Smoking status: Former     Packs/day: 0.50     Years: 4.00     Additional pack years: 0.00     Total pack years: 2.00     Types: Cigarettes     Start date:      Quit date: 1990     Years since quittin.9    Smokeless tobacco: Never    Tobacco comments:     Smoking in social settings   Vaping Use    Vaping Use: Never used   Substance and Sexual Activity    Alcohol use: Not Currently     Comment: Social    Drug use: No    Sexual activity: Not Currently     Partners: Male     Birth control/protection: Post-menopausal       Family History   Problem Relation Age of Onset    Alzheimer's disease Mother     Hypertension Mother     Hyperlipidemia Mother     Mental illness Mother         Alzheimer's    Stroke Mother     Dementia Mother     Colon  "cancer Father     Hypertension Father     Asthma Father     Cancer Father         Father colon cancer, daughter Ovarian, sister ovarian    Hearing loss Father     Hyperlipidemia Father     Ovarian cancer Sister     Ovarian cancer Daughter          Procedures       Objective:     /72 (BP Location: Right arm, Patient Position: Sitting, Cuff Size: Adult)   Pulse 83   Ht 160 cm (63\")   Wt 57.2 kg (126 lb 3.2 oz)   SpO2 96%   BMI 22.36 kg/m²  Body mass index is 22.36 kg/m².     Constitutional:       General: Not in acute distress.     Appearance: Well-developed. Not diaphoretic.   Eyes:      Pupils: Pupils are equal, round, and reactive to light.   HENT:      Head: Normocephalic and atraumatic.   Neck:      Thyroid: No thyromegaly.   Pulmonary:      Effort: Pulmonary effort is normal. No respiratory distress.      Breath sounds: Normal breath sounds. No wheezing. No rales.   Chest:      Chest wall: Not tender to palpatation.   Cardiovascular:      Normal rate. Regular rhythm.      No gallop.    Pulses:     Intact distal pulses.   Edema:     Peripheral edema absent.   Abdominal:      General: Bowel sounds are normal. There is no distension.      Palpations: Abdomen is soft.      Tenderness: There is no guarding.   Musculoskeletal: Normal range of motion.         General: No deformity.      Cervical back: Normal range of motion and neck supple. Skin:     General: Skin is warm and dry.      Findings: No rash.   Neurological:      Mental Status: Alert and oriented to person, place, and time.      Cranial Nerves: No cranial nerve deficit.      Deep Tendon Reflexes: Reflexes are normal and symmetric.   Psychiatric:         Judgment: Judgment normal.         Review Of Data: I have reviewed pertinent recent labs, images and documents and pertinent findings included in HPI or assessment below.    Lipid Panel          7/13/2023    09:18   Lipid Panel   Total Cholesterol 166    Triglycerides 69    HDL Cholesterol 55  "   VLDL Cholesterol 13    LDL Cholesterol  98          Assessment/Plan:           History of syncopal spell followed by fall resulting in facial injury and concussion.  Echocardiogram and extended Holter did not reveal etiology of syncope.  Initial ER work-up was unremarkable.  Essential hypertension on spironolactone.  Takes verapamil for migraine.  Vertebral artery stenosis on aspirin statin/prior left cerebellar infarct incidentally noted on MRI.  Hyperlipidemia on statin-        Excellent lipid panel  Blood pressure above goal-I will switch to spironolactone and valsartan.  She will continue to monitor blood pressure and call with logs in 2 weeks.  Otherwise continue current care with statin and aspirin  Continue regular physical activity and exercise.    Follow-up in cardiology clinic in a year or sooner with any concerning symptoms.    Diagnosis and plan of care discussed with patient and verbalized understanding.            Your medication list            Accurate as of November 29, 2023  3:53 PM. If you have any questions, ask your nurse or doctor.                START taking these medications        Instructions Last Dose Given Next Dose Due   valsartan 80 MG tablet  Commonly known as: DIOVAN  Started by: Stephane Luther MD      Take 0.5 tablets by mouth Daily.              CHANGE how you take these medications        Instructions Last Dose Given Next Dose Due   buPROPion  MG 24 hr tablet  Commonly known as: WELLBUTRIN XL  What changed: when to take this      Take 1 tablet by mouth Daily.       rosuvastatin 10 MG tablet  Commonly known as: CRESTOR  What changed: when to take this      Take 1 tablet by mouth Daily.       verapamil  MG 24 hr capsule  Commonly known as: VERELAN  What changed: when to take this      Take 1 capsule by mouth Daily.              CONTINUE taking these medications        Instructions Last Dose Given Next Dose Due   aspirin 81 MG EC tablet      Take 1 tablet by mouth  Every Morning.       Biotin 10 MG tablet      Take 10 mg by mouth Every Morning.       Cetirizine HCl 10 MG capsule      Take 10 mg by mouth Every Morning.       D-Mannose 500 MG capsule      Take 4 capsules by mouth Every Morning.       diclofenac 0.1 % ophthalmic solution  Commonly known as: VOLTAREN      INSTILL 1 DROP IN BOTH EYES FOUR TIMES DAILY       estradiol 0.1 MG/GM vaginal cream  Commonly known as: ESTRACE      Insert 0.001 g into the vagina Daily.       metoclopramide 10 MG tablet  Commonly known as: REGLAN      Take 1 tablet by mouth As Needed (pain). Esophageal spasm (max dose: 30mg per day)       omeprazole 20 MG capsule  Commonly known as: priLOSEC      Take 1 capsule by mouth Every Morning.       prednisoLONE acetate 1 % ophthalmic suspension  Commonly known as: PRED FORTE      SHAKE LIQUID AND INSTILL 1 DROP IN BOTH EYES FOUR TIMES DAILY              STOP taking these medications      spironolactone 50 MG tablet  Commonly known as: ALDACTONE  Stopped by: Stephane Luther MD                  Where to Get Your Medications        These medications were sent to [id number] - [place], [place] - [address] AT [id number] - [phone] [facility] [phone]   [address], [facility] [address]      Phone: [phone]   valsartan 80 MG tablet       Information about where to get these medications is not yet available    Ask your nurse or doctor about these medications  verapamil  MG 24 hr capsule             Stephane Luther MD  11/29/23  15:53 EST     No

## 2023-12-08 ENCOUNTER — OFFICE VISIT (OUTPATIENT)
Dept: INTERNAL MEDICINE | Facility: CLINIC | Age: 73
End: 2023-12-08
Payer: MEDICARE

## 2023-12-08 VITALS
WEIGHT: 127.5 LBS | OXYGEN SATURATION: 96 % | BODY MASS INDEX: 22.59 KG/M2 | SYSTOLIC BLOOD PRESSURE: 142 MMHG | RESPIRATION RATE: 14 BRPM | TEMPERATURE: 97.6 F | HEART RATE: 72 BPM | DIASTOLIC BLOOD PRESSURE: 68 MMHG | HEIGHT: 63 IN

## 2023-12-08 DIAGNOSIS — E78.2 MIXED HYPERLIPIDEMIA: ICD-10-CM

## 2023-12-08 DIAGNOSIS — I10 PRIMARY HYPERTENSION: Primary | ICD-10-CM

## 2023-12-08 PROCEDURE — 3078F DIAST BP <80 MM HG: CPT | Performed by: FAMILY MEDICINE

## 2023-12-08 PROCEDURE — 3077F SYST BP >= 140 MM HG: CPT | Performed by: FAMILY MEDICINE

## 2023-12-08 PROCEDURE — 1160F RVW MEDS BY RX/DR IN RCRD: CPT | Performed by: FAMILY MEDICINE

## 2023-12-08 PROCEDURE — 99214 OFFICE O/P EST MOD 30 MIN: CPT | Performed by: FAMILY MEDICINE

## 2023-12-08 PROCEDURE — 1159F MED LIST DOCD IN RCRD: CPT | Performed by: FAMILY MEDICINE

## 2023-12-08 RX ORDER — VALSARTAN 80 MG/1
80 TABLET ORAL DAILY
Start: 2023-12-08

## 2023-12-08 NOTE — PROGRESS NOTES
"Chief Complaint  Hypertension, Fall, Loss of Consciousness, and Follow-up (1 MONTH )    Subjective        Karena Perez presents to [facility] PRIMARY CARE  History of Present Illness  Patient follows up for ongoing management of hypertension hyperlipidemia she is getting some fluctuating blood pressure readings no syncopal episodes anymore she has had cardiology consultation her verapamil is for migraine prevention she was started on valsartan 80 mg  Objective   Vital Signs:  /68 (BP Location: Left arm, Patient Position: Sitting)   Pulse 72   Temp 97.6 °F (36.4 °C)   Resp 14   Ht 160 cm (62.99\")   Wt 57.8 kg (127 lb 8 oz)   SpO2 96%   BMI 22.59 kg/m²   Estimated body mass index is 22.59 kg/m² as calculated from the following:    Height as of this encounter: 160 cm (62.99\").    Weight as of this encounter: 57.8 kg (127 lb 8 oz).       BMI is within normal parameters. No other follow-up for BMI required.      Physical Exam  Vitals and nursing note reviewed.   Constitutional:       Appearance: Normal appearance.   HENT:      Head: Normocephalic and atraumatic.   Cardiovascular:      Rate and Rhythm: Normal rate and regular rhythm.      Pulses: Normal pulses.      Heart sounds: Normal heart sounds.   Pulmonary:      Effort: Pulmonary effort is normal.      Breath sounds: Normal breath sounds.   Musculoskeletal:      Right lower leg: No edema.      Left lower leg: No edema.   Neurological:      Mental Status: She is alert.        Result Review :    Common labs          7/13/2023    09:18 10/20/2023    16:46 10/21/2023    04:19   Common Labs   Glucose 85  86  103    BUN 13  11  10    Creatinine 0.97  0.85  0.70    Sodium 137  135  137    Potassium 4.7  4.8  4.4    Chloride 99  101  104    Calcium 10.3  9.5  8.7    Total Protein 7.5      Albumin 5.1  4.4     Total Bilirubin 0.4  0.3     Alkaline Phosphatase 121  99     AST (SGOT) 21  24     ALT (SGPT) 18  14     WBC  9.48  8.80    Hemoglobin  " 14.7  13.8    Hematocrit  44.1  42.5    Platelets  307  291    Total Cholesterol 166      Triglycerides 69      HDL Cholesterol 55      LDL Cholesterol  98                     Assessment and Plan   Diagnoses and all orders for this visit:    1. Primary hypertension (Primary)    2. Mixed hyperlipidemia    Other orders  -     valsartan (DIOVAN) 80 MG tablet; Take 1 tablet by mouth Daily.    Increase valsartan to 80 mg daily monitor blood pressure with goal less than 140/90  Hyperlipidemia continue Crestor         Follow Up   Return in about 1 month (around 1/8/2024), or if symptoms worsen or fail to improve, for Recheck.  Patient was given instructions and counseling regarding her condition or for health maintenance advice. Please see specific information pulled into the AVS if appropriate.

## 2024-01-10 ENCOUNTER — OFFICE VISIT (OUTPATIENT)
Dept: INTERNAL MEDICINE | Facility: CLINIC | Age: 74
End: 2024-01-10
Payer: MEDICARE

## 2024-01-10 VITALS
SYSTOLIC BLOOD PRESSURE: 150 MMHG | HEIGHT: 63 IN | OXYGEN SATURATION: 98 % | BODY MASS INDEX: 22.25 KG/M2 | WEIGHT: 125.6 LBS | DIASTOLIC BLOOD PRESSURE: 82 MMHG | HEART RATE: 72 BPM | TEMPERATURE: 98 F

## 2024-01-10 DIAGNOSIS — F33.0 MILD EPISODE OF RECURRENT MAJOR DEPRESSIVE DISORDER: ICD-10-CM

## 2024-01-10 DIAGNOSIS — I10 PRIMARY HYPERTENSION: Primary | ICD-10-CM

## 2024-01-10 DIAGNOSIS — F33.1 MODERATE EPISODE OF RECURRENT MAJOR DEPRESSIVE DISORDER: Chronic | ICD-10-CM

## 2024-01-10 LAB
BUN SERPL-MCNC: 14 MG/DL (ref 8–23)
BUN/CREAT SERPL: 16.3 (ref 7–25)
CALCIUM SERPL-MCNC: 10.1 MG/DL (ref 8.6–10.5)
CHLORIDE SERPL-SCNC: 101 MMOL/L (ref 98–107)
CO2 SERPL-SCNC: 27.2 MMOL/L (ref 22–29)
CREAT SERPL-MCNC: 0.86 MG/DL (ref 0.57–1)
EGFRCR SERPLBLD CKD-EPI 2021: 70.6 ML/MIN/1.73
GLUCOSE SERPL-MCNC: 77 MG/DL (ref 65–99)
POTASSIUM SERPL-SCNC: 4.6 MMOL/L (ref 3.5–5.2)
SODIUM SERPL-SCNC: 138 MMOL/L (ref 136–145)

## 2024-01-10 PROCEDURE — 3077F SYST BP >= 140 MM HG: CPT | Performed by: FAMILY MEDICINE

## 2024-01-10 PROCEDURE — 1160F RVW MEDS BY RX/DR IN RCRD: CPT | Performed by: FAMILY MEDICINE

## 2024-01-10 PROCEDURE — 3079F DIAST BP 80-89 MM HG: CPT | Performed by: FAMILY MEDICINE

## 2024-01-10 PROCEDURE — 1159F MED LIST DOCD IN RCRD: CPT | Performed by: FAMILY MEDICINE

## 2024-01-10 PROCEDURE — 99214 OFFICE O/P EST MOD 30 MIN: CPT | Performed by: FAMILY MEDICINE

## 2024-01-10 RX ORDER — VALSARTAN 80 MG/1
80 TABLET ORAL DAILY
Qty: 90 TABLET | Refills: 2 | Status: SHIPPED | OUTPATIENT
Start: 2024-01-10

## 2024-01-10 RX ORDER — VALSARTAN 80 MG/1
80 TABLET ORAL DAILY
Qty: 90 TABLET | Refills: 2
Start: 2024-01-10 | End: 2024-01-10 | Stop reason: SDUPTHER

## 2024-01-10 RX ORDER — BUPROPION HYDROCHLORIDE 300 MG/1
300 TABLET ORAL EVERY MORNING
Qty: 90 TABLET | Refills: 2 | Status: SHIPPED | OUTPATIENT
Start: 2024-01-10

## 2024-01-10 NOTE — PROGRESS NOTES
"Chief Complaint  Hypertension    Subjective        Karena Perez presents to [facility] PRIMARY CARE  Hypertension    Patient follows up after dose adjustment for valsartan as she has had some elevated blood pressure readings Home blood pressure readings less than 140/90 no chest pain shortness of breath or increased fatigue she initially started on verapamil as headache prevention she has ocular migraines last an hour or so and then subside verapamil has been helping no unwanted side effects no edema no constipation  Mood is stable with Wellbutrin she like continue the same    Objective   Vital Signs:  /82   Pulse 72   Temp 98 °F (36.7 °C)   Ht 160 cm (62.99\")   Wt 57 kg (125 lb 9.6 oz)   SpO2 98%   BMI 22.25 kg/m²   Estimated body mass index is 22.25 kg/m² as calculated from the following:    Height as of this encounter: 160 cm (62.99\").    Weight as of this encounter: 57 kg (125 lb 9.6 oz).       BMI is within normal parameters. No other follow-up for BMI required.      Physical Exam  Vitals and nursing note reviewed.   Constitutional:       Appearance: Normal appearance.   Cardiovascular:      Rate and Rhythm: Normal rate and regular rhythm.   Pulmonary:      Effort: Pulmonary effort is normal.   Musculoskeletal:      Right lower leg: No edema.      Left lower leg: No edema.   Neurological:      Mental Status: She is alert.   Psychiatric:         Mood and Affect: Mood normal.         Behavior: Behavior normal.         Thought Content: Thought content normal.         Judgment: Judgment normal.        Result Review :    Common labs          7/13/2023    09:18 10/20/2023    16:46 10/21/2023    04:19   Common Labs   Glucose 85  86  103    BUN 13  11  10    Creatinine 0.97  0.85  0.70    Sodium 137  135  137    Potassium 4.7  4.8  4.4    Chloride 99  101  104    Calcium 10.3  9.5  8.7    Total Protein 7.5      Albumin 5.1  4.4     Total Bilirubin 0.4  0.3     Alkaline Phosphatase 121  99  "    AST (SGOT) 21  24     ALT (SGPT) 18  14     WBC  9.48  8.80    Hemoglobin  14.7  13.8    Hematocrit  44.1  42.5    Platelets  307  291    Total Cholesterol 166      Triglycerides 69      HDL Cholesterol 55      LDL Cholesterol  98                     Assessment and Plan   Diagnoses and all orders for this visit:    1. Primary hypertension (Primary)  -     Discontinue: valsartan (DIOVAN) 80 MG tablet; Take 1 tablet by mouth Daily.  Dispense: 90 tablet; Refill: 2  -     Basic Metabolic Panel  -     valsartan (DIOVAN) 80 MG tablet; Take 1 tablet by mouth Daily.  Dispense: 90 tablet; Refill: 2    2. Moderate episode of recurrent major depressive disorder    3. Mild episode of recurrent major depressive disorder  -     buPROPion XL (WELLBUTRIN XL) 300 MG 24 hr tablet; Take 1 tablet by mouth Every Morning.  Dispense: 90 tablet; Refill: 2    Follow-up results of testing otherwise as needed or  She can take valsartan in the morning verapamil in the evening       Follow Up   Return in about 6 months (around 7/10/2024), or if symptoms worsen or fail to improve, for Recheck.  Patient was given instructions and counseling regarding her condition or for health maintenance advice. Please see specific information pulled into the AVS if appropriate.

## 2024-04-03 ENCOUNTER — OFFICE VISIT (OUTPATIENT)
Dept: INTERNAL MEDICINE | Facility: CLINIC | Age: 74
End: 2024-04-03
Payer: MEDICARE

## 2024-04-03 VITALS
SYSTOLIC BLOOD PRESSURE: 126 MMHG | HEIGHT: 63 IN | OXYGEN SATURATION: 97 % | DIASTOLIC BLOOD PRESSURE: 80 MMHG | BODY MASS INDEX: 22.52 KG/M2 | HEART RATE: 70 BPM | TEMPERATURE: 98.3 F | WEIGHT: 127.1 LBS

## 2024-04-03 DIAGNOSIS — G62.9 NEUROPATHY: ICD-10-CM

## 2024-04-03 DIAGNOSIS — K29.30 CHRONIC SUPERFICIAL GASTRITIS WITHOUT BLEEDING: ICD-10-CM

## 2024-04-03 DIAGNOSIS — E78.2 MIXED HYPERLIPIDEMIA: ICD-10-CM

## 2024-04-03 DIAGNOSIS — Z86.73 HISTORY OF CVA (CEREBROVASCULAR ACCIDENT) WITHOUT RESIDUAL DEFICITS: ICD-10-CM

## 2024-04-03 DIAGNOSIS — I10 PRIMARY HYPERTENSION: Primary | ICD-10-CM

## 2024-04-03 RX ORDER — VERAPAMIL HYDROCHLORIDE 240 MG/1
240 CAPSULE, EXTENDED RELEASE ORAL DAILY
Qty: 90 CAPSULE | Refills: 2 | Status: SHIPPED | OUTPATIENT
Start: 2024-04-03

## 2024-04-03 RX ORDER — ROSUVASTATIN CALCIUM 10 MG/1
10 TABLET, COATED ORAL DAILY
Qty: 90 TABLET | Refills: 2 | Status: SHIPPED | OUTPATIENT
Start: 2024-04-03

## 2024-04-03 NOTE — PROGRESS NOTES
"Chief Complaint  Numbness (Tingling/ swelling hands and feet; poss valsartan reaction)    Subjective        Karena Perez presents to [facility]  History of Present Illness  Patient follows up for ongoing management of chronic problems of hypertension hyperlipidemia who has developed some numbness and tingling in her arms bilaterally she is so she is now she has carpal tunnel syndrome in her upper extremities on the right bothers her more than left and specially the first 3 fingers she has been using splints on and off has she been told in the past but she feels she is also experiencing more numbness and tingling on the right arm as well she did not take any anti-inflammatory she has never had an injection of the carpal tunnel  Her diet is been fairly stable no unwanted side effects of statin therapy although she does also complain of some myalgias and general  Blood pressure is well-controlled uses a Verelan specifically for migraine prevention and has been very effective for her  Objective   Vital Signs:  /80   Pulse 70   Temp 98.3 °F (36.8 °C)   Ht 160 cm (62.99\")   Wt 57.7 kg (127 lb 1.6 oz)   SpO2 97%   BMI 22.52 kg/m²   Estimated body mass index is 22.52 kg/m² as calculated from the following:    Height as of this encounter: 160 cm (62.99\").    Weight as of this encounter: 57.7 kg (127 lb 1.6 oz).       BMI is within normal parameters. No other follow-up for BMI required.      Physical Exam   Result Review :      Common labs          10/20/2023    16:46 10/21/2023    04:19 1/10/2024    08:39   Common Labs   Glucose 86  103  77    BUN 11  10  14    Creatinine 0.85  0.70  0.86    Sodium 135  137  138    Potassium 4.8  4.4  4.6    Chloride 101  104  101    Calcium 9.5  8.7  10.1    Albumin 4.4      Total Bilirubin 0.3      Alkaline Phosphatase 99      AST (SGOT) 24      ALT (SGPT) 14      WBC 9.48  8.80     Hemoglobin 14.7  13.8     Hematocrit 44.1  42.5     Platelets 307 " " 291                    Assessment and Plan     Diagnoses and all orders for this visit:    1. Primary hypertension (Primary)  -     verapamil ER (VERELAN) 240 MG 24 hr capsule; Take 1 capsule by mouth Daily.  Dispense: 90 capsule; Refill: 2    2. Mixed hyperlipidemia    3. History of CVA (cerebrovascular accident) without residual deficits    4. Neuropathy  -     Methylmalonic Acid, Serum  -     Folate    5. Chronic superficial gastritis without bleeding  -     Ferritin  -     Iron Profile    Other orders  -     rosuvastatin (CRESTOR) 10 MG tablet; Take 1 tablet by mouth Daily.  Dispense: 90 tablet; Refill: 2    Hyperlipidemia continue statin therapy recommend co-Q10 once daily  History of CVA stable no lateralizing or neurologic findings  This patient has a PCP that is the continuing focal point for all health care services, and the patient sees this physician to be evaluated for hypertension. The inherent complexity that this code () captures is not in the clinical condition itself-- hypertension --but rather the cognitition of the continued responsibility of being the focal point for all needed services for this patient.\"        Follow Up     Return in about 1 month (around 5/3/2024), or if symptoms worsen or fail to improve, for Recheck.  Patient was given instructions and counseling regarding her condition or for health maintenance advice. Please see specific information pulled into the AVS if appropriate.         "

## 2024-04-06 ENCOUNTER — PATIENT MESSAGE (OUTPATIENT)
Dept: INTERNAL MEDICINE | Facility: CLINIC | Age: 74
End: 2024-04-06
Payer: MEDICARE

## 2024-04-06 DIAGNOSIS — I10 PRIMARY HYPERTENSION: ICD-10-CM

## 2024-04-08 RX ORDER — VALSARTAN 80 MG/1
80 TABLET ORAL DAILY
Qty: 90 TABLET | Refills: 2 | Status: SHIPPED | OUTPATIENT
Start: 2024-04-08

## 2024-04-11 LAB
FERRITIN SERPL-MCNC: 171 NG/ML (ref 15–150)
FOLATE SERPL-MCNC: 8.3 NG/ML
IRON SATN MFR SERPL: 33 % (ref 15–55)
IRON SERPL-MCNC: 112 UG/DL (ref 27–139)
METHYLMALONATE SERPL-SCNC: 331 NMOL/L (ref 0–378)
TIBC SERPL-MCNC: 337 UG/DL (ref 250–450)
UIBC SERPL-MCNC: 225 UG/DL (ref 118–369)

## 2024-05-23 RX ORDER — ROSUVASTATIN CALCIUM 10 MG/1
10 TABLET, COATED ORAL DAILY
Qty: 90 TABLET | Refills: 2 | Status: SHIPPED | OUTPATIENT
Start: 2024-05-23

## 2024-07-10 ENCOUNTER — OFFICE VISIT (OUTPATIENT)
Dept: INTERNAL MEDICINE | Facility: CLINIC | Age: 74
End: 2024-07-10
Payer: MEDICARE

## 2024-07-10 VITALS
TEMPERATURE: 97.8 F | HEART RATE: 66 BPM | SYSTOLIC BLOOD PRESSURE: 138 MMHG | DIASTOLIC BLOOD PRESSURE: 78 MMHG | RESPIRATION RATE: 12 BRPM | OXYGEN SATURATION: 97 % | WEIGHT: 125.5 LBS | BODY MASS INDEX: 22.24 KG/M2

## 2024-07-10 DIAGNOSIS — E78.2 MIXED HYPERLIPIDEMIA: ICD-10-CM

## 2024-07-10 DIAGNOSIS — F33.1 MODERATE EPISODE OF RECURRENT MAJOR DEPRESSIVE DISORDER: Chronic | ICD-10-CM

## 2024-07-10 DIAGNOSIS — I10 PRIMARY HYPERTENSION: Primary | ICD-10-CM

## 2024-07-10 PROCEDURE — G2211 COMPLEX E/M VISIT ADD ON: HCPCS | Performed by: FAMILY MEDICINE

## 2024-07-10 PROCEDURE — 3078F DIAST BP <80 MM HG: CPT | Performed by: FAMILY MEDICINE

## 2024-07-10 PROCEDURE — 3075F SYST BP GE 130 - 139MM HG: CPT | Performed by: FAMILY MEDICINE

## 2024-07-10 PROCEDURE — 1159F MED LIST DOCD IN RCRD: CPT | Performed by: FAMILY MEDICINE

## 2024-07-10 PROCEDURE — 99214 OFFICE O/P EST MOD 30 MIN: CPT | Performed by: FAMILY MEDICINE

## 2024-07-10 PROCEDURE — 1160F RVW MEDS BY RX/DR IN RCRD: CPT | Performed by: FAMILY MEDICINE

## 2024-07-10 NOTE — PROGRESS NOTES
"Chief Complaint  Hypertension    Subjective        Karena Perez presents to [facility]  History of Present Illness  Patient follows up for ongoing management of chronic problems of hypertension hyperlipidemia mood is stable blood pressure well-controlled no chest pain shortness of breath or increased fatigue  Objective   Vital Signs:  /78   Pulse 66   Temp 97.8 °F (36.6 °C)   Resp 12   Wt 56.9 kg (125 lb 8 oz)   SpO2 97%   BMI 22.24 kg/m²   Estimated body mass index is 22.24 kg/m² as calculated from the following:    Height as of 4/3/24: 160 cm (62.99\").    Weight as of this encounter: 56.9 kg (125 lb 8 oz).       BMI is within normal parameters. No other follow-up for BMI required.      Physical Exam  Constitutional:       Appearance: Normal appearance.   HENT:      Head: Normocephalic and atraumatic.      Right Ear: Tympanic membrane, ear canal and external ear normal.      Left Ear: Tympanic membrane, ear canal and external ear normal.   Eyes:      General: No scleral icterus.  Cardiovascular:      Rate and Rhythm: Normal rate and regular rhythm.      Pulses: Normal pulses.      Heart sounds: Normal heart sounds.   Pulmonary:      Effort: Pulmonary effort is normal.      Breath sounds: Normal breath sounds.   Musculoskeletal:      Right lower leg: No edema.      Left lower leg: No edema.   Neurological:      Mental Status: She is alert.   Psychiatric:         Mood and Affect: Mood normal.         Behavior: Behavior normal.         Thought Content: Thought content normal.         Judgment: Judgment normal.        Result Review :      Common labs          10/20/2023    16:46 10/21/2023    04:19 1/10/2024    08:39   Common Labs   Glucose 86  103  77    BUN 11  10  14    Creatinine 0.85  0.70  0.86    Sodium 135  137  138    Potassium 4.8  4.4  4.6    Chloride 101  104  101    Calcium 9.5  8.7  10.1    Albumin 4.4      Total Bilirubin 0.3      Alkaline Phosphatase 99    " "  AST (SGOT) 24      ALT (SGPT) 14      WBC 9.48  8.80     Hemoglobin 14.7  13.8     Hematocrit 44.1  42.5     Platelets 307  291                    Assessment and Plan     Diagnoses and all orders for this visit:    1. Primary hypertension (Primary)  -     Comprehensive Metabolic Panel  -     TSH    2. Mixed hyperlipidemia  -     Lipid Panel    3. Moderate episode of recurrent major depressive disorder    Results of testing for further management hypertension hyperlipidemia  Continue appropriate for depression     This patient has a PCP that is the continuing focal point for all health care services, and the patient sees this physician to be evaluated for hypertension. The inherent complexity that this code () captures is not in the clinical condition itself-- hypertension --but rather the cognitition of the continued responsibility of being the focal point for all needed services for this patient.\"     Follow Up     Return if symptoms worsen or fail to improve, for Medicare Wellness.  Patient was given instructions and counseling regarding her condition or for health maintenance advice. Please see specific information pulled into the AVS if appropriate.         "

## 2024-07-11 LAB
ALBUMIN SERPL-MCNC: 4.6 G/DL (ref 3.5–5.2)
ALBUMIN/GLOB SERPL: 2.1 G/DL
ALP SERPL-CCNC: 103 U/L (ref 39–117)
ALT SERPL-CCNC: 15 U/L (ref 1–33)
AST SERPL-CCNC: 16 U/L (ref 1–32)
BILIRUB SERPL-MCNC: 0.3 MG/DL (ref 0–1.2)
BUN SERPL-MCNC: 15 MG/DL (ref 8–23)
BUN/CREAT SERPL: 17 (ref 7–25)
CALCIUM SERPL-MCNC: 9.8 MG/DL (ref 8.6–10.5)
CHLORIDE SERPL-SCNC: 101 MMOL/L (ref 98–107)
CHOLEST SERPL-MCNC: 155 MG/DL (ref 0–200)
CO2 SERPL-SCNC: 26 MMOL/L (ref 22–29)
CREAT SERPL-MCNC: 0.88 MG/DL (ref 0.57–1)
EGFRCR SERPLBLD CKD-EPI 2021: 68.2 ML/MIN/1.73
GLOBULIN SER CALC-MCNC: 2.2 GM/DL
GLUCOSE SERPL-MCNC: 91 MG/DL (ref 65–99)
HDLC SERPL-MCNC: 57 MG/DL (ref 40–60)
LDLC SERPL CALC-MCNC: 82 MG/DL (ref 0–100)
POTASSIUM SERPL-SCNC: 4.5 MMOL/L (ref 3.5–5.2)
PROT SERPL-MCNC: 6.8 G/DL (ref 6–8.5)
SODIUM SERPL-SCNC: 139 MMOL/L (ref 136–145)
TRIGL SERPL-MCNC: 87 MG/DL (ref 0–150)
TSH SERPL DL<=0.005 MIU/L-ACNC: 2.97 UIU/ML (ref 0.27–4.2)
VLDLC SERPL CALC-MCNC: 16 MG/DL (ref 5–40)

## 2024-07-29 ENCOUNTER — TELEPHONE (OUTPATIENT)
Dept: NEUROLOGY | Facility: CLINIC | Age: 74
End: 2024-07-29
Payer: MEDICARE

## 2024-07-29 NOTE — TELEPHONE ENCOUNTER
LVM in regard to new location of Neurology Clinic and sent [employer] Message on new address (If Applicable)

## 2024-07-31 ENCOUNTER — OFFICE VISIT (OUTPATIENT)
Dept: NEUROLOGY | Facility: CLINIC | Age: 74
End: 2024-07-31
Payer: MEDICARE

## 2024-07-31 VITALS
OXYGEN SATURATION: 96 % | HEART RATE: 72 BPM | DIASTOLIC BLOOD PRESSURE: 72 MMHG | WEIGHT: 127 LBS | HEIGHT: 63 IN | SYSTOLIC BLOOD PRESSURE: 142 MMHG | BODY MASS INDEX: 22.5 KG/M2

## 2024-07-31 DIAGNOSIS — G43.109 COMPLICATED MIGRAINE: Primary | ICD-10-CM

## 2024-07-31 DIAGNOSIS — Z86.73 HISTORY OF CVA (CEREBROVASCULAR ACCIDENT) WITHOUT RESIDUAL DEFICITS: ICD-10-CM

## 2024-07-31 PROCEDURE — 3078F DIAST BP <80 MM HG: CPT | Performed by: PSYCHIATRY & NEUROLOGY

## 2024-07-31 PROCEDURE — 99214 OFFICE O/P EST MOD 30 MIN: CPT | Performed by: PSYCHIATRY & NEUROLOGY

## 2024-07-31 PROCEDURE — 1159F MED LIST DOCD IN RCRD: CPT | Performed by: PSYCHIATRY & NEUROLOGY

## 2024-07-31 PROCEDURE — 1160F RVW MEDS BY RX/DR IN RCRD: CPT | Performed by: PSYCHIATRY & NEUROLOGY

## 2024-07-31 PROCEDURE — 3077F SYST BP >= 140 MM HG: CPT | Performed by: PSYCHIATRY & NEUROLOGY

## 2024-07-31 RX ORDER — UBIDECARENONE 100 MG
100 CAPSULE ORAL DAILY
COMMUNITY

## 2024-07-31 NOTE — PROGRESS NOTES
Chief Complaint   Patient presents with    Migraine    Hx Stroke       Patient ID: Karena Perez is a 74 y.o. female.    HPI:  I had the pleasure of seeing your patient today. As you may know she is a 74-year-old female here for the management of stroke with vertebral artery stenosis as well as migraine headaches with aura and ocular migraine. She is still taking verapamil which has been her preventative medication for migraine and migraine with aura for several years now.  She reports approximately 4 days of headache since last visit.  She did have a syncopal episode in October of last year.  No other symptoms that are new neurologically.  No new onset focal weakness or numbness of her arms or legs.  No double vision or loss of vision    The following portions of the patient's history were reviewed and updated as appropriate: allergies, current medications, past family history, past medical history, past social history, past surgical history and problem list.    Review of Systems   Neurological:  Positive for syncope and headaches. Negative for dizziness, tremors, seizures, facial asymmetry, speech difficulty, weakness, light-headedness and numbness.   Psychiatric/Behavioral:  Negative for agitation, behavioral problems, confusion, decreased concentration, dysphoric mood, hallucinations, self-injury, sleep disturbance and suicidal ideas. The patient is not nervous/anxious and is not hyperactive.       I have reviewed the review of systems above performed by my medical assistant.      Vitals:    07/31/24 1158   BP: 142/72   Pulse: 72   SpO2: 96%       Neurologic Exam     Mental Status   Oriented to person, place, and time.   Concentration: normal.   Level of consciousness: alert  Knowledge: consistent with education (No deficits found.).     Cranial Nerves     CN II   Visual fields full to confrontation.     CN III, IV, VI   Pupils are equal, round, and reactive to light.  Extraocular motions are normal.   CN III: no  CN III palsy  CN VI: no CN VI palsy    CN V   Facial sensation intact.     CN VII   Facial expression full, symmetric.     CN VIII   CN VIII normal.     CN IX, X   CN IX normal.   CN X normal.     CN XI   CN XI normal.     CN XII   CN XII normal.     Motor Exam     Strength   Right neck flexion: 5/5  Left neck flexion: 5/5  Right neck extension: 5/5  Left neck extension: 5/5  Right deltoid: 5/5  Left deltoid: 5/5  Right biceps: 5/5  Left biceps: 5/5  Right triceps: 5/5  Left triceps: 5/5  Right wrist flexion: 5/5  Left wrist flexion: 5/5  Right wrist extension: 5/5  Left wrist extension: 5/5  Right interossei: 5/5  Left interossei: 5/5  Right abdominals: 5/5  Left abdominals: 5/5  Right iliopsoas: 5/5  Left iliopsoas: 5/5  Right quadriceps: 5/5  Left quadriceps: 5/5  Right hamstrin/5  Left hamstrin/5  Right glutei: 5/5  Left glutei: 5/5  Right anterior tibial: 5/5  Left anterior tibial: 5/5  Right posterior tibial: 5/5  Left posterior tibial: 5/5  Right peroneal: 5/5  Left peroneal: 5/5  Right gastroc: 5/5  Left gastroc: 5/5    Sensory Exam   Light touch normal.   Vibration normal.     Gait, Coordination, and Reflexes     Gait  Gait: normal    Reflexes   Right brachioradialis: 2+  Left brachioradialis: 2+  Right biceps: 2+  Left biceps: 2+  Right triceps: 2+  Left triceps: 2+  Right patellar: 2+  Left patellar: 2+  Right achilles: 2+  Left achilles: 2+  Right : 2+  Left : 2+Station is normal.       Physical Exam  Vitals reviewed.   Constitutional:       Appearance: She is well-developed.   HENT:      Head: Normocephalic and atraumatic.   Eyes:      Extraocular Movements: EOM normal.      Pupils: Pupils are equal, round, and reactive to light.   Cardiovascular:      Rate and Rhythm: Normal rate and regular rhythm.   Pulmonary:      Breath sounds: Normal breath sounds.   Musculoskeletal:         General: Normal range of motion.   Skin:     General: Skin is warm.   Neurological:      Mental Status: She  is oriented to person, place, and time.      Gait: Gait is intact.      Deep Tendon Reflexes:      Reflex Scores:       Tricep reflexes are 2+ on the right side and 2+ on the left side.       Bicep reflexes are 2+ on the right side and 2+ on the left side.       Brachioradialis reflexes are 2+ on the right side and 2+ on the left side.       Patellar reflexes are 2+ on the right side and 2+ on the left side.       Achilles reflexes are 2+ on the right side and 2+ on the left side.        Procedures    Assessment/Plan: I would like to continue with her current regimen including the verapamil.  Will see her back in 8 months or sooner if needed.  A total of 30 minutes was spent face-to-face with the patient today.  Of that greater than 50% of this time was spent discussing signs and symptoms of migraine, vertebral artery stenosis, patient education, plan of care and prognosis.         Diagnoses and all orders for this visit:    1. Complicated migraine (Primary)    2. History of CVA (cerebrovascular accident) without residual deficits           Devang Hudson II, MD

## 2024-08-13 ENCOUNTER — TELEPHONE (OUTPATIENT)
Dept: INTERNAL MEDICINE | Facility: CLINIC | Age: 74
End: 2024-08-13
Payer: MEDICARE

## 2024-08-15 ENCOUNTER — OFFICE VISIT (OUTPATIENT)
Dept: INTERNAL MEDICINE | Facility: CLINIC | Age: 74
End: 2024-08-15
Payer: MEDICARE

## 2024-08-15 VITALS
BODY MASS INDEX: 21.97 KG/M2 | HEIGHT: 63 IN | OXYGEN SATURATION: 97 % | TEMPERATURE: 97.9 F | DIASTOLIC BLOOD PRESSURE: 72 MMHG | SYSTOLIC BLOOD PRESSURE: 126 MMHG | HEART RATE: 76 BPM | WEIGHT: 124 LBS

## 2024-08-15 DIAGNOSIS — B34.9 ACUTE VIRAL SYNDROME: ICD-10-CM

## 2024-08-15 DIAGNOSIS — R09.81 NASAL CONGESTION: Primary | ICD-10-CM

## 2024-08-15 LAB
EXPIRATION DATE: NORMAL
FLUAV AG UPPER RESP QL IA.RAPID: NOT DETECTED
FLUBV AG UPPER RESP QL IA.RAPID: NOT DETECTED
INTERNAL CONTROL: NORMAL
Lab: NORMAL
SARS-COV-2 AG UPPER RESP QL IA.RAPID: NOT DETECTED

## 2024-08-15 PROCEDURE — 99213 OFFICE O/P EST LOW 20 MIN: CPT | Performed by: FAMILY MEDICINE

## 2024-08-15 PROCEDURE — 87428 SARSCOV & INF VIR A&B AG IA: CPT | Performed by: FAMILY MEDICINE

## 2024-08-15 PROCEDURE — 3074F SYST BP LT 130 MM HG: CPT | Performed by: FAMILY MEDICINE

## 2024-08-15 PROCEDURE — 1160F RVW MEDS BY RX/DR IN RCRD: CPT | Performed by: FAMILY MEDICINE

## 2024-08-15 PROCEDURE — 3078F DIAST BP <80 MM HG: CPT | Performed by: FAMILY MEDICINE

## 2024-08-15 PROCEDURE — 1126F AMNT PAIN NOTED NONE PRSNT: CPT | Performed by: FAMILY MEDICINE

## 2024-08-15 PROCEDURE — 1159F MED LIST DOCD IN RCRD: CPT | Performed by: FAMILY MEDICINE

## 2024-08-15 NOTE — PROGRESS NOTES
"Chief Complaint  Dizziness and Nasal Congestion    Subjective        Karena Perez presents to [facility]  Dizziness      Patient visit for acute problem of nasal congestion intermittent dizziness 1 week getting better no falling feels fullness in left ear  No fever sweats or chills no cough  Some improvement with Epley  Objective   Vital Signs:  /72   Pulse 76   Temp 97.9 °F (36.6 °C) (Temporal)   Ht 160 cm (62.99\")   Wt 56.2 kg (124 lb)   SpO2 97%   BMI 21.97 kg/m²   Estimated body mass index is 21.97 kg/m² as calculated from the following:    Height as of this encounter: 160 cm (62.99\").    Weight as of this encounter: 56.2 kg (124 lb).    BMI is within normal parameters. No other follow-up for BMI required.      Physical Exam  Vitals and nursing note reviewed.   Constitutional:       Appearance: Normal appearance.   HENT:      Right Ear: Tympanic membrane, ear canal and external ear normal. There is no impacted cerumen.      Left Ear: Ear canal and external ear normal. There is no impacted cerumen.      Ears:      Comments: Slightly dull TM left     Nose: Congestion present.      Mouth/Throat:      Pharynx: No posterior oropharyngeal erythema.   Eyes:      General:         Left eye: No discharge.   Cardiovascular:      Rate and Rhythm: Normal rate and regular rhythm.   Pulmonary:      Effort: Pulmonary effort is normal.      Breath sounds: Normal breath sounds.   Lymphadenopathy:      Cervical: No cervical adenopathy.   Neurological:      Mental Status: She is alert.   Psychiatric:         Mood and Affect: Mood normal.         Behavior: Behavior normal.         Thought Content: Thought content normal.         Judgment: Judgment normal.        Result Review :                Assessment and Plan   Diagnoses and all orders for this visit:    1. Nasal congestion (Primary)  -     POCT SARS-CoV-2 Antigen TAMARA + Flu    2. Acute viral syndrome    Symptomatic treatment  With Zyrtec " "for 1 week use saline nasal spray with Nasacort     This patient has a PCP that is the continuing focal point for all health care services, and the patient sees this physician to be evaluated for viral syndrome. The inherent complexity that this code () captures is not in the clinical condition itself-- viral syndrome --but rather the cognitition of the continued responsibility of being the focal point for all needed services for this patient.\"     Follow Up   Return if symptoms worsen or fail to improve, for Recheck, Medicare Wellness.  Patient was given instructions and counseling regarding her condition or for health maintenance advice. Please see specific information pulled into the AVS if appropriate.             "

## 2024-11-19 ENCOUNTER — OFFICE VISIT (OUTPATIENT)
Dept: INTERNAL MEDICINE | Facility: CLINIC | Age: 74
End: 2024-11-19
Payer: MEDICARE

## 2024-11-19 VITALS
HEIGHT: 63 IN | TEMPERATURE: 97.3 F | HEART RATE: 75 BPM | DIASTOLIC BLOOD PRESSURE: 70 MMHG | SYSTOLIC BLOOD PRESSURE: 124 MMHG | BODY MASS INDEX: 21.37 KG/M2 | WEIGHT: 120.6 LBS | OXYGEN SATURATION: 93 %

## 2024-11-19 DIAGNOSIS — Z00.00 MEDICARE ANNUAL WELLNESS VISIT, SUBSEQUENT: Primary | ICD-10-CM

## 2024-11-19 DIAGNOSIS — Z00.00 HEALTHCARE MAINTENANCE: ICD-10-CM

## 2024-11-19 PROCEDURE — 3074F SYST BP LT 130 MM HG: CPT | Performed by: FAMILY MEDICINE

## 2024-11-19 PROCEDURE — 1170F FXNL STATUS ASSESSED: CPT | Performed by: FAMILY MEDICINE

## 2024-11-19 PROCEDURE — 99397 PER PM REEVAL EST PAT 65+ YR: CPT | Performed by: FAMILY MEDICINE

## 2024-11-19 PROCEDURE — 3078F DIAST BP <80 MM HG: CPT | Performed by: FAMILY MEDICINE

## 2024-11-19 PROCEDURE — G0439 PPPS, SUBSEQ VISIT: HCPCS | Performed by: FAMILY MEDICINE

## 2024-11-19 PROCEDURE — 1126F AMNT PAIN NOTED NONE PRSNT: CPT | Performed by: FAMILY MEDICINE

## 2024-11-19 NOTE — PROGRESS NOTES
Subjective   The ABCs of the Annual Wellness Visit  Medicare Wellness Visit      Karena Perez is a 74 y.o. patient who presents for a Medicare Wellness Visit.    The following portions of the patient's history were reviewed and   updated as appropriate: allergies, current medications, past family history, past medical history, past social history, past surgical history, and problem list.    Compared to one year ago, the patient's physical   health is the same.  Compared to one year ago, the patient's mental   health is the same.    Recent Hospitalizations:  She was not admitted to the hospital during the last year.     Current Medical Providers:  Patient Care Team:  Monster Hodges MD as PCP - General (Family Medicine)    Outpatient Medications Prior to Visit   Medication Sig Dispense Refill    aspirin 81 MG EC tablet Take 1 tablet by mouth Every Morning.      Biotin 10 MG tablet Take 10 mg by mouth Every Morning.      buPROPion XL (WELLBUTRIN XL) 300 MG 24 hr tablet Take 1 tablet by mouth Every Morning. 90 tablet 2    Cetirizine HCl 10 MG capsule Take 10 mg by mouth Every Morning.      coenzyme Q10 100 MG capsule Take 1 capsule by mouth Daily.      D-Mannose 500 MG capsule Take  by mouth Every Morning. Taking 1,000mg      estradiol (ESTRACE) 0.1 MG/GM vaginal cream Insert 0.001 g into the vagina Daily.      metoclopramide (REGLAN) 10 MG tablet Take 1 tablet by mouth As Needed (pain). Esophageal spasm (max dose: 30mg per day) 30 tablet 0    omeprazole (priLOSEC) 20 MG capsule Take 1 capsule by mouth Every Morning.      Probiotic Product (PROBIOTIC ADVANCED PO) Take  by mouth.      rosuvastatin (CRESTOR) 10 MG tablet TAKE 1 TABLET DAILY 90 tablet 2    valsartan (DIOVAN) 80 MG tablet Take 1 tablet by mouth Daily. 90 tablet 2    verapamil ER (VERELAN) 240 MG 24 hr capsule Take 1 capsule by mouth Daily. 90 capsule 2     No facility-administered medications prior to visit.     No opioid medication identified on active  "medication list. I have reviewed chart for other potential  high risk medication/s and harmful drug interactions in the elderly.      Aspirin is on active medication list. Aspirin use is indicated based on review of current medical condition/s. Pros and cons of this therapy have been discussed today. Benefits of this medication outweigh potential harm.  Patient has been encouraged to continue taking this medication.  .      Patient Active Problem List   Diagnosis    Complicated migraine    Depression    Mixed hyperlipidemia    Primary hypertension    Heme positive stool    FH: colon cancer    Dysphagia    Colon polyp    Age-related cataract    Arthritis    Drusen (degenerative) of macula, bilateral    Gastroesophageal reflux disease    Irritable bowel syndrome    Microscopic hematuria    Prolapse of female genital organs    Seasonal allergic rhinitis    Vitreous degeneration of both eyes    Coker esophagus    Medicare annual wellness visit, subsequent    Healthcare maintenance    Syncope    History of CVA (cerebrovascular accident) without residual deficits    Neuropathy    Chronic superficial gastritis without bleeding    Acute viral syndrome     Advance Care Planning Advance Directive is not on file.  ACP discussion was held with the patient during this visit. Patient has an advance directive (not in EMR), copy requested.            Objective   Vitals:    11/19/24 0753   BP: 124/70   Pulse: 75   Temp: 97.3 °F (36.3 °C)   TempSrc: Temporal   SpO2: 93%   Weight: 54.7 kg (120 lb 9.6 oz)   Height: 160 cm (62.99\")   PainSc: 0-No pain       Estimated body mass index is 21.37 kg/m² as calculated from the following:    Height as of this encounter: 160 cm (62.99\").    Weight as of this encounter: 54.7 kg (120 lb 9.6 oz).    BMI is within normal parameters. No other follow-up for BMI required.       Does the patient have evidence of cognitive impairment? No                                                                   "                             Health  Risk Assessment    Smoking Status:  Social History     Tobacco Use   Smoking Status Former    Current packs/day: 0.00    Average packs/day: 0.5 packs/day for 4.0 years (2.0 ttl pk-yrs)    Types: Cigarettes    Start date:     Quit date: 1990    Years since quittin.9   Smokeless Tobacco Never   Tobacco Comments    Smoking in social settings     Alcohol Consumption:  Social History     Substance and Sexual Activity   Alcohol Use Not Currently    Alcohol/week: 1.0 standard drink of alcohol    Types: 1 Glasses of wine per week    Comment: Social       Fall Risk Screen  STEADI Fall Risk Assessment was completed, and patient is at LOW risk for falls.Assessment completed on:2024    Depression Screening   Little interest or pleasure in doing things? Not at all   Feeling down, depressed, or hopeless? Not at all   PHQ-2 Total Score 0      Health Habits and Functional and Cognitive Screenin/19/2024     7:58 AM   Functional & Cognitive Status   Do you have difficulty preparing food and eating? No   Do you have difficulty bathing yourself, getting dressed or grooming yourself? No   Do you have difficulty using the toilet? No   Do you have difficulty moving around from place to place? No   Do you have trouble with steps or getting out of a bed or a chair? No   Current Diet Well Balanced Diet   Dental Exam Up to date   Eye Exam Up to date   Exercise (times per week) 3 times per week   Current Exercises Include Walking   Do you need help using the phone?  No   Are you deaf or do you have serious difficulty hearing?  No   Do you need help to go to places out of walking distance? No   Do you need help shopping? No   Do you need help preparing meals?  No   Do you need help with housework?  No   Do you need help with laundry? No   Do you need help taking your medications? No   Do you need help managing money? No   Do you ever drive or ride in a car without wearing a seat  belt? No   Have you felt unusual stress, anger or loneliness in the last month? No   Who do you live with? Spouse   If you need help, do you have trouble finding someone available to you? No   Have you been bothered in the last four weeks by sexual problems? No   Do you have difficulty concentrating, remembering or making decisions? No           Age-appropriate Screening Schedule:  Refer to the list below for future screening recommendations based on patient's age, sex and/or medical conditions. Orders for these recommended tests are listed in the plan section. The patient has been provided with a written plan.    Health Maintenance List  Health Maintenance   Topic Date Due    ANNUAL WELLNESS VISIT  07/13/2024    RSV Vaccine - Adults (1 - 1-dose 75+ series) 11/19/2025 (Originally 2/2/2023)    DXA SCAN  04/14/2025    LIPID PANEL  07/10/2025    COLORECTAL CANCER SCREENING  03/03/2026    TDAP/TD VACCINES (3 - Td or Tdap) 10/25/2026    HEPATITIS C SCREENING  Completed    COVID-19 Vaccine  Completed    INFLUENZA VACCINE  Completed    Pneumococcal Vaccine 65+  Completed    ZOSTER VACCINE  Completed    MAMMOGRAM  Discontinued                                                                                                                                                CMS Preventative Services Quick Reference  Risk Factors Identified During Encounter  None Identified    The above risks/problems have been discussed with the patient.  Pertinent information has been shared with the patient in the After Visit Summary.  An After Visit Summary and PPPS were made available to the patient.    Follow Up:   Next Medicare Wellness visit to be scheduled in 1 year.     Assessment & Plan  Medicare annual wellness visit, subsequent         Healthcare maintenance              Follow Up:   Return in about 1 year (around 11/19/2025), or if symptoms worsen or fail to improve, for Medicare Wellness.

## 2024-11-19 NOTE — PROGRESS NOTES
Subjective   Karena Perez is a 74 y.o. female.     Chief Complaint   Patient presents with    Medicare Wellness-subsequent         History of Present Illness   Karena Perez 74 y.o. female who presents for an Annual Wellness Visit.  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  she has been feeling well.   I  reviewed health maintenance with her as part of my preventative care plan.  Recommend regular dental and eye exams  The following portions of the patient's history were reviewed and updated as appropriate: allergies, current medications, past family history, past medical history, past social history, past surgical history, and problem list.    Review of Systems   Constitutional:  Negative for appetite change, fever and unexpected weight change.   HENT:  Negative for ear pain, facial swelling and sore throat.    Eyes:  Negative for pain and visual disturbance.   Respiratory:  Negative for chest tightness, shortness of breath and wheezing.    Cardiovascular:  Negative for chest pain and palpitations.   Gastrointestinal:  Negative for abdominal pain and blood in stool.   Endocrine: Negative.    Genitourinary:  Negative for difficulty urinating and hematuria.   Musculoskeletal:  Negative for joint swelling.    Neurological:  Negative for tremors, seizures and syncope.   Hematological:  Negative for adenopathy.   Psychiatric/Behavioral: Negative.     All other systems reviewed and are negative.      Objective   Physical Exam  Vitals and nursing note reviewed.   Constitutional:       Appearance: Normal appearance. She is well-developed. She is not diaphoretic.   HENT:      Head: Normocephalic and atraumatic.      Right Ear: Tympanic membrane, ear canal and external ear normal.      Left Ear: Tympanic membrane, ear canal and external ear normal.   Eyes:      General: Lids are normal. No scleral icterus.     Extraocular Movements: Extraocular movements intact.      Conjunctiva/sclera: Conjunctivae normal.   Neck:      Thyroid: No thyroid mass or thyromegaly.      Vascular: No carotid bruit or JVD.   Cardiovascular:      Rate and Rhythm: Normal rate and regular rhythm.      Pulses: Normal pulses.           Radial pulses are 2+ on the right side and 2+ on the left side.      Heart sounds: Normal heart sounds. No murmur heard.  Pulmonary:      Effort: Pulmonary effort is normal. No respiratory distress.      Breath sounds: Normal breath sounds.   Abdominal:      Palpations: Abdomen is soft.      Tenderness: There is no right CVA tenderness or left CVA tenderness.   Musculoskeletal:      Cervical back: Normal range of motion.      Right lower leg: No edema.      Left lower leg: No edema.   Skin:     General: Skin is warm and dry.      Coloration: Skin is not pale.      Findings: No erythema or rash.   Neurological:      General: No focal deficit present.      Mental Status: She is alert and oriented to person, place, and time.      Sensory: No sensory deficit.      Deep Tendon Reflexes: Reflexes are normal and symmetric.   Psychiatric:         Mood and Affect: Mood normal.         Behavior: Behavior normal. Behavior is cooperative.         Thought Content: Thought content normal.         Judgment: Judgment normal.          Assessment & Plan   Diagnoses and all orders for this visit:    1. Medicare annual wellness visit, subsequent (Primary)  Assessment & Plan:             2. Healthcare maintenance  Assessment & Plan:             Chronic medical problems stable hypertension hyperlipidemia  Continue attempts at healthy lifestyle calorie appropriate diet and regular physical activity  Education provided regarding prevention of serious illness with immunizations patient's current  Education provided regarding early detection screening patient is current with mammograms and colorectal cancer screening  Follow-up ongoing management of chronic problems unless preventively annually

## 2024-12-04 ENCOUNTER — OFFICE VISIT (OUTPATIENT)
Dept: CARDIOLOGY | Facility: CLINIC | Age: 74
End: 2024-12-04
Payer: MEDICARE

## 2024-12-04 VITALS
DIASTOLIC BLOOD PRESSURE: 56 MMHG | HEIGHT: 63 IN | HEART RATE: 73 BPM | BODY MASS INDEX: 21.79 KG/M2 | SYSTOLIC BLOOD PRESSURE: 120 MMHG | WEIGHT: 123 LBS | OXYGEN SATURATION: 94 %

## 2024-12-04 DIAGNOSIS — R55 SYNCOPE AND COLLAPSE: Primary | ICD-10-CM

## 2024-12-04 DIAGNOSIS — I10 PRIMARY HYPERTENSION: ICD-10-CM

## 2024-12-04 DIAGNOSIS — E78.2 MIXED HYPERLIPIDEMIA: ICD-10-CM

## 2024-12-04 PROCEDURE — 99213 OFFICE O/P EST LOW 20 MIN: CPT | Performed by: INTERNAL MEDICINE

## 2024-12-04 PROCEDURE — 3078F DIAST BP <80 MM HG: CPT | Performed by: INTERNAL MEDICINE

## 2024-12-04 PROCEDURE — 3074F SYST BP LT 130 MM HG: CPT | Performed by: INTERNAL MEDICINE

## 2024-12-04 NOTE — PROGRESS NOTES
PATIENTINFORMATION    Date of Office Visit: 2024  Encounter Provider: Stephane Luther MD  Place of Service: [facility] CARDIOLOGY  Patient Name: Karena Perez  : 1950    Subjective:     Encounter Date:2024      Patient ID: Karena Perez is a 74 y.o. female.    Chief Complaint   Patient presents with    Hypertension    Loss of Consciousness       HPI  Ms. Perez is a pleasant 74 years old lady who came to cardiology clinic for follow-up visit.  She is compliant with all current medications without significant side effects and denies any ER visit or hospitalization since last clinic visit.  She denies any rest or exertional chest pain, shortness of breath, orthopnea, PND, palpitations, presyncope syncope or extremity swelling.   Blood pressure runs normal during home monitoring.  She walks regularly without limiting symptoms.  No recurrence of syncope.      ROS  All systems reviewed and negative except as noted in HPI.    Past Medical History:   Diagnosis Date    Allergic     Anxiety     Arthritis     Cholelithiasis Prior to my 23th birthday    Colon polyp 16    I believe I had a polyp  during colonoscopy    Depression     GERD (gastroesophageal reflux disease)     Headache     Hyperlipidemia     Hypertension     Irritable bowel syndrome     Low back pain     Migraine     Neuropathy 4/3/2024    Pneumonia     Syncope 10/20/2023    Urinary tract infection     Visual impairment        Past Surgical History:   Procedure Laterality Date    APPENDECTOMY  1968    Removed along w/gall bladder    BREAST BIOPSY      CATARACT EXTRACTION Bilateral     CHOLECYSTECTOMY      COLONOSCOPY  2015    HP polyp, IH.    COLONOSCOPY  2011    EH, IH    COLONOSCOPY N/A 2018    Procedure: COLONOSCOPY WITH POLYPECTOMY (COLD BX);  Surgeon: Jona Villavicencio MD;  Location: [facility] ENDOSCOPY;  Service: Gastroenterology    COLONOSCOPY N/A 2023    Procedure: COLONOSCOPY to cecum with  polypectomy;  Surgeon: Jona Villavicencio MD;  Location:  [facility]OSCOPY;  Service: Gastroenterology;  Laterality: N/A;  pre-hx of colon polyp, family hx of colon ca  post-diverticulosis, polyps, hemorrhoids    ENDOSCOPY N/A 2018    Procedure: ESOPHAGOGASTRODUODENOSCOPY WITH BIOPSIES PARSONS DILATION;  Surgeon: Jona Villavicencio MD;  Location:  [facility];  Service: Gastroenterology    ENDOSCOPY N/A 2023    Procedure: ESOPHAGOGASTRODUODENOSCOPY with biopsy;  Surgeon: Jona Villavicencio MD;  Location: [facility] ENDOSCOPY;  Service: Gastroenterology;  Laterality: N/A;  pre-dyspepsia  post-gastritis    EYE SURGERY      Vision correction    TONSILLECTOMY  1964    TUBAL ABDOMINAL LIGATION         Social History     Socioeconomic History    Marital status:     Number of children: 3   Tobacco Use    Smoking status: Former     Current packs/day: 0.00     Average packs/day: 0.5 packs/day for 4.0 years (2.0 ttl pk-yrs)     Types: Cigarettes     Start date: 1986     Quit date: 1990     Years since quittin.9    Smokeless tobacco: Never    Tobacco comments:     Smoking in social settings, start date is not real date   Vaping Use    Vaping status: Never Used   Substance and Sexual Activity    Alcohol use: Not Currently     Alcohol/week: 1.0 standard drink of alcohol     Types: 1 Glasses of wine per week     Comment: Social    Drug use: No    Sexual activity: Not Currently     Partners: Male     Birth control/protection: Post-menopausal       Family History   Problem Relation Age of Onset    Alzheimer's disease Mother     Hypertension Mother     Hyperlipidemia Mother     Mental illness Mother         Alzheimer's    Stroke Mother     Dementia Mother     Colon cancer Father     Hypertension Father     Asthma Father     Cancer Father         Father colon cancer, daughter Ovarian, sister ovarian    Hearing loss Father     Hyperlipidemia Father     Ovarian cancer Sister     Ovarian cancer Daughter   "        Procedures       Objective:     /56   Pulse 73   Ht 160 cm (62.99\")   Wt 55.8 kg (123 lb)   SpO2 94%   BMI 21.80 kg/m²  Body mass index is 21.8 kg/m².     Constitutional:       General: Not in acute distress.     Appearance: Well-developed. Not diaphoretic.   Eyes:      Pupils: Pupils are equal, round, and reactive to light.   HENT:      Head: Normocephalic and atraumatic.   Neck:      Thyroid: No thyromegaly.   Pulmonary:      Effort: Pulmonary effort is normal. No respiratory distress.      Breath sounds: Normal breath sounds. No wheezing. No rales.   Chest:      Chest wall: Not tender to palpatation.   Cardiovascular:      Normal rate. Regular rhythm.      No gallop.    Pulses:     Intact distal pulses.   Edema:     Peripheral edema absent.   Abdominal:      General: Bowel sounds are normal. There is no distension.      Palpations: Abdomen is soft.      Tenderness: There is no guarding.   Musculoskeletal: Normal range of motion.         General: No deformity.      Cervical back: Normal range of motion and neck supple. Skin:     General: Skin is warm and dry.      Findings: No rash.   Neurological:      Mental Status: Alert and oriented to person, place, and time.      Cranial Nerves: No cranial nerve deficit.      Deep Tendon Reflexes: Reflexes are normal and symmetric.   Psychiatric:         Judgment: Judgment normal.         Review Of Data: I have reviewed pertinent recent labs, images and documents and pertinent findings included in HPI or assessment below.    Lipid Panel          7/10/2024    08:54   Lipid Panel   Total Cholesterol 155    Triglycerides 87    HDL Cholesterol 57    VLDL Cholesterol 16    LDL Cholesterol  82          Assessment/Plan:     History of syncopal spell followed by fall resulting in facial injury and concussion.  Echocardiogram and extended Holter did not reveal etiology of syncope.  Initial ER work-up was unremarkable.  Essential hypertension on valsartan and " verapamil(takes for headache)  Vertebral artery stenosis on aspirin statin/prior left cerebellar infarct incidentally noted on MRI.  Hyperlipidemia on statin-       She is doing very well from cardiac standpoint.  Blood pressure on both arm reads 140/80 but normal during home monitoring.  Lipid panel looks great.  No recurrence of syncope  She will continue to exercise regularly and continue current medications.  Follow-up with cardiac clinic as needed.    Diagnosis and plan of care discussed with patient and verbalized understanding.            Your medication list            Accurate as of December 4, 2024  9:21 AM. If you have any questions, ask your nurse or doctor.                CONTINUE taking these medications        Instructions Last Dose Given Next Dose Due   aspirin 81 MG EC tablet      Take 1 tablet by mouth Every Morning.       Biotin 10 MG tablet      Take 10 mg by mouth Every Morning.       buPROPion  MG 24 hr tablet  Commonly known as: WELLBUTRIN XL      Take 1 tablet by mouth Every Morning.       Cetirizine HCl 10 MG capsule      Take 10 mg by mouth Every Morning.       coenzyme Q10 100 MG capsule      Take 1 capsule by mouth Daily.       D-Mannose 500 MG capsule      Take  by mouth Every Morning. Taking 1,000mg       estradiol 0.1 MG/GM vaginal cream  Commonly known as: ESTRACE      Insert 0.001 g into the vagina Daily.       metoclopramide 10 MG tablet  Commonly known as: REGLAN      Take 1 tablet by mouth As Needed (pain). Esophageal spasm (max dose: 30mg per day)       omeprazole 20 MG capsule  Commonly known as: priLOSEC      Take 1 capsule by mouth Every Morning.       PROBIOTIC ADVANCED PO      Take 1 tablet by mouth Daily.       rosuvastatin 10 MG tablet  Commonly known as: CRESTOR      TAKE 1 TABLET DAILY       valsartan 80 MG tablet  Commonly known as: DIOVAN      Take 1 tablet by mouth Daily.       verapamil  MG 24 hr capsule  Commonly known as: VERELAN      Take 1 capsule by  mouth Daily.                    Stephane Luther MD  12/04/24  09:21 EST

## 2025-01-01 DIAGNOSIS — I10 PRIMARY HYPERTENSION: ICD-10-CM

## 2025-01-02 RX ORDER — VALSARTAN 80 MG/1
80 TABLET ORAL DAILY
Qty: 90 TABLET | Refills: 2 | Status: SHIPPED | OUTPATIENT
Start: 2025-01-02

## 2025-01-12 ENCOUNTER — PATIENT MESSAGE (OUTPATIENT)
Dept: INTERNAL MEDICINE | Facility: CLINIC | Age: 75
End: 2025-01-12
Payer: MEDICARE

## 2025-01-12 DIAGNOSIS — I10 PRIMARY HYPERTENSION: ICD-10-CM

## 2025-01-12 DIAGNOSIS — F33.0 MILD EPISODE OF RECURRENT MAJOR DEPRESSIVE DISORDER: ICD-10-CM

## 2025-01-13 RX ORDER — VERAPAMIL HYDROCHLORIDE 240 MG/1
240 CAPSULE, EXTENDED RELEASE ORAL DAILY
Qty: 90 CAPSULE | Refills: 1 | Status: SHIPPED | OUTPATIENT
Start: 2025-01-13

## 2025-01-13 RX ORDER — BUPROPION HYDROCHLORIDE 300 MG/1
300 TABLET ORAL EVERY MORNING
Qty: 90 TABLET | Refills: 1 | Status: SHIPPED | OUTPATIENT
Start: 2025-01-13

## 2025-03-25 ENCOUNTER — OFFICE VISIT (OUTPATIENT)
Dept: NEUROLOGY | Facility: CLINIC | Age: 75
End: 2025-03-25
Payer: MEDICARE

## 2025-03-25 VITALS
DIASTOLIC BLOOD PRESSURE: 68 MMHG | WEIGHT: 124 LBS | HEIGHT: 63 IN | OXYGEN SATURATION: 97 % | SYSTOLIC BLOOD PRESSURE: 152 MMHG | HEART RATE: 76 BPM | BODY MASS INDEX: 21.97 KG/M2

## 2025-03-25 DIAGNOSIS — G43.109 OCULAR MIGRAINE: ICD-10-CM

## 2025-03-25 DIAGNOSIS — Z86.73 HISTORY OF CVA (CEREBROVASCULAR ACCIDENT) WITHOUT RESIDUAL DEFICITS: ICD-10-CM

## 2025-03-25 DIAGNOSIS — G43.109 COMPLICATED MIGRAINE: Primary | ICD-10-CM

## 2025-03-25 PROCEDURE — 3078F DIAST BP <80 MM HG: CPT | Performed by: PSYCHIATRY & NEUROLOGY

## 2025-03-25 PROCEDURE — 1160F RVW MEDS BY RX/DR IN RCRD: CPT | Performed by: PSYCHIATRY & NEUROLOGY

## 2025-03-25 PROCEDURE — 3077F SYST BP >= 140 MM HG: CPT | Performed by: PSYCHIATRY & NEUROLOGY

## 2025-03-25 PROCEDURE — 99214 OFFICE O/P EST MOD 30 MIN: CPT | Performed by: PSYCHIATRY & NEUROLOGY

## 2025-03-25 PROCEDURE — 1159F MED LIST DOCD IN RCRD: CPT | Performed by: PSYCHIATRY & NEUROLOGY

## 2025-03-25 NOTE — PROGRESS NOTES
Chief Complaint   Patient presents with    Migraine    Hx CVA       Patient ID: Karena Perez is a 75 y.o. female.    HPI:  I had the pleasure of seeing your patient today. As you may know she is a 75-year-old female here for the management of stroke with vertebral artery stenosis as well as migraine headaches with aura and ocular migraine. She is still taking verapamil which has been her preventative medication for migraine and migraine with aura for several years now.  She reports approximately 4 days of headache since last visit.  She did have a syncopal episode 2 years ago.  No other issues that are new neurologically.  No new onset focal weakness or numbness of her arms or legs.  No double vision or loss of vision     The following portions of the patient's history were reviewed and updated as appropriate: allergies, current medications, past family history, past medical history, past social history, past surgical history and problem list.    Review of Systems   Neurological:  Negative for dizziness, tremors, seizures, syncope, facial asymmetry, speech difficulty, weakness, light-headedness, numbness and headaches.   Psychiatric/Behavioral:  Negative for agitation, behavioral problems, confusion, decreased concentration, dysphoric mood, hallucinations, self-injury, sleep disturbance and suicidal ideas. The patient is not nervous/anxious and is not hyperactive.       I have reviewed the review of systems above performed by my medical assistant.      Vitals:    03/25/25 1243   BP: 152/68   Pulse: 76   SpO2: 97%       Neurological Exam  Mental Status  Awake, alert and oriented to person, place and time. Recent and remote memory are intact. Speech is normal. Language is fluent with no aphasia. Attention and concentration are normal. Fund of knowledge is appropriate for level of education.    Cranial Nerves  CN I: Sense of smell is normal.  CN II: Visual acuity is normal.  CN III, IV, VI: Extraocular movements intact  bilaterally. Pupils equal round and reactive to light bilaterally.  CN V: Facial sensation is normal.  CN VII: Full and symmetric facial movement.  CN XI: Shoulder shrug strength is normal.  CN XII: Tongue midline without atrophy or fasciculations.    Motor  Normal muscle bulk throughout. No fasciculations present. Normal muscle tone. No abnormal involuntary movements. No pronator drift.                                             Right                     Left  Rhomboids                            5                          5  Infraspinatus                          5                          5  Supraspinatus                       5                          5  Deltoid                                   5                          5   Biceps                                   5                          5  Brachioradialis                      5                          5   Triceps                                  5                          5   Pronator                                5                          5   Supinator                              5                           5   Wrist flexor                            5                          5   Wrist extensor                       5                          5   Finger flexor                          5                          5   Finger extensor                     5                          5   Interossei                              5                          5   Abductor pollicis brevis         5                          5   Flexor pollicis brevis             5                          5   Opponens pollicis                 5                          5  Extensor digitorum               5                          5  Abductor digiti minimi           5                          5   Abdominal                            5                          5  Glutei                                    5                          5  Hip abductor                         5                           5  Hip adductor                         5                          5   Iliopsoas                               5                          5   Quadriceps                           5                          5   Hamstring                             5                          5   Gastrocnemius                     5                           5   Anterior tibialis                      5                          5   Posterior tibialis                    5                          5   Peroneal                               5                          5  Ankle dorsiflexor                   5                          5  Ankle plantar flexor              5                           5  Extensor hallucis longus      5                           5    Sensory  Sensation is intact to light touch, pinprick, vibration and proprioception in all four extremities.    Reflexes  Deep tendon reflexes are 2+ and symmetric in all four extremities.    Right pathological reflexes: Shaw's absent.  Left pathological reflexes: Shaw's absent.    Coordination    Finger-to-nose, rapid alternating movements and heel-to-shin normal bilaterally without dysmetria.    Gait  Normal casual, toe, heel and tandem gait.       Physical Exam  Vitals reviewed.   Constitutional:       General: She is not in acute distress.     Appearance: She is well-developed.   HENT:      Head: Normocephalic and atraumatic.   Eyes:      Extraocular Movements: Extraocular movements intact.      Pupils: Pupils are equal, round, and reactive to light.   Cardiovascular:      Rate and Rhythm: Normal rate and regular rhythm.      Heart sounds: Normal heart sounds.   Pulmonary:      Effort: Pulmonary effort is normal. No respiratory distress.      Breath sounds: Normal breath sounds.   Abdominal:      General: Bowel sounds are normal. There is no distension.      Palpations: Abdomen is soft.      Tenderness: There is no abdominal tenderness.   Musculoskeletal:          General: No deformity.      Cervical back: Normal range of motion.   Skin:     General: Skin is warm.      Findings: No rash.   Neurological:      Coordination: Coordination is intact.      Deep Tendon Reflexes: Reflexes are normal and symmetric.   Psychiatric:         Speech: Speech normal.         Judgment: Judgment normal.         Procedures    Assessment/Plan: We are going to continue seeing her on a yearly basis at this point.  Will see her back in 1 year or sooner if needed.  A total of 30 minutes was spent face-to-face with the patient today.  Of that greater than 50% of this time was spent discussing signs and symptoms of those stated below, patient education, plan of care and prognosis.           Diagnoses and all orders for this visit:    1. Complicated migraine (Primary)    2. Ocular migraine    3. History of CVA (cerebrovascular accident) without residual deficits           Devang Hudson II, MD

## 2025-06-18 RX ORDER — ROSUVASTATIN CALCIUM 10 MG/1
10 TABLET, COATED ORAL DAILY
Qty: 90 TABLET | Refills: 2 | Status: SHIPPED | OUTPATIENT
Start: 2025-06-18

## 2025-07-01 DIAGNOSIS — I10 PRIMARY HYPERTENSION: ICD-10-CM

## 2025-07-01 DIAGNOSIS — F33.0 MILD EPISODE OF RECURRENT MAJOR DEPRESSIVE DISORDER: ICD-10-CM

## 2025-07-01 RX ORDER — BUPROPION HYDROCHLORIDE 300 MG/1
300 TABLET ORAL EVERY MORNING
Qty: 90 TABLET | Refills: 1 | Status: SHIPPED | OUTPATIENT
Start: 2025-07-01

## 2025-07-01 RX ORDER — VERAPAMIL HYDROCHLORIDE 240 MG/1
240 CAPSULE, DELAYED RELEASE ORAL DAILY
Qty: 90 CAPSULE | Refills: 1 | Status: SHIPPED | OUTPATIENT
Start: 2025-07-01

## 2025-07-28 ENCOUNTER — HOSPITAL ENCOUNTER (OUTPATIENT)
Dept: GENERAL RADIOLOGY | Facility: HOSPITAL | Age: 75
Discharge: HOME | End: 2025-07-28
Admitting: FAMILY MEDICINE
Payer: MEDICARE

## 2025-07-28 ENCOUNTER — OFFICE VISIT (OUTPATIENT)
Dept: INTERNAL MEDICINE | Facility: CLINIC | Age: 75
End: 2025-07-28
Payer: MEDICARE

## 2025-07-28 VITALS
DIASTOLIC BLOOD PRESSURE: 64 MMHG | TEMPERATURE: 98.3 F | HEIGHT: 63 IN | SYSTOLIC BLOOD PRESSURE: 138 MMHG | WEIGHT: 124.6 LBS | HEART RATE: 70 BPM | OXYGEN SATURATION: 98 % | BODY MASS INDEX: 22.08 KG/M2

## 2025-07-28 DIAGNOSIS — G89.29 CHRONIC LEFT SHOULDER PAIN: Primary | ICD-10-CM

## 2025-07-28 DIAGNOSIS — M25.512 CHRONIC LEFT SHOULDER PAIN: Primary | ICD-10-CM

## 2025-07-28 PROBLEM — M25.519 SHOULDER PAIN: Status: ACTIVE | Noted: 2025-07-28

## 2025-07-28 PROCEDURE — 73030 X-RAY EXAM OF SHOULDER: CPT

## 2025-07-28 PROCEDURE — 1125F AMNT PAIN NOTED PAIN PRSNT: CPT | Performed by: FAMILY MEDICINE

## 2025-07-28 PROCEDURE — G2211 COMPLEX E/M VISIT ADD ON: HCPCS | Performed by: FAMILY MEDICINE

## 2025-07-28 PROCEDURE — 1159F MED LIST DOCD IN RCRD: CPT | Performed by: FAMILY MEDICINE

## 2025-07-28 PROCEDURE — 1160F RVW MEDS BY RX/DR IN RCRD: CPT | Performed by: FAMILY MEDICINE

## 2025-07-28 PROCEDURE — 99213 OFFICE O/P EST LOW 20 MIN: CPT | Performed by: FAMILY MEDICINE

## 2025-07-28 PROCEDURE — 3078F DIAST BP <80 MM HG: CPT | Performed by: FAMILY MEDICINE

## 2025-07-28 PROCEDURE — 3075F SYST BP GE 130 - 139MM HG: CPT | Performed by: FAMILY MEDICINE

## 2025-07-28 RX ORDER — CELECOXIB 200 MG/1
200 CAPSULE ORAL DAILY
Qty: 30 CAPSULE | Refills: 1 | Status: SHIPPED | OUTPATIENT
Start: 2025-07-28

## 2025-07-28 RX ORDER — OMEGA-3 FATTY ACIDS/FISH OIL 300-1000MG
CAPSULE ORAL
COMMUNITY
Start: 2025-07-13 | End: 2025-07-28 | Stop reason: ALTCHOICE

## 2025-07-28 NOTE — PROGRESS NOTES
"Chief Complaint  Shoulder Pain (Bilateral, worse in left )    Janneth Perez presents to [facility]    Symptoms are: chronic.   Onset was 6 to 12 months.   Symptoms occur: constantly.  Symptoms include: joint pain.   Pertinent negative symptoms include no abdominal pain, no anorexia, no change in stool, no chest pain, no chills, no congestion, no cough, no diaphoresis, no fatigue, no fever, no headaches, no joint swelling, no myalgias, no nausea, no neck pain, no numbness, no rash, no sore throat, no swollen glands, no dysuria, no vertigo, no visual change, no vomiting and no weakness.   Treatment and/or Medications comments include: Ibuprofen   Additional information: Pain in both shoulders, left shoulder worse.    Patient appointment to discuss shoulder pain left worse than right does not have the strength she used to have she does not have is same range of motion has some specific point tenderness anteriorly  No trauma  Does not take any specific anti-inflammatories  Objective   Vital Signs:  /64   Pulse 70   Temp 98.3 °F (36.8 °C)   Ht 160 cm (63\")   Wt 56.5 kg (124 lb 9.6 oz)   SpO2 98%   BMI 22.07 kg/m²   Estimated body mass index is 22.07 kg/m² as calculated from the following:    Height as of this encounter: 160 cm (63\").    Weight as of this encounter: 56.5 kg (124 lb 9.6 oz).    BMI is within normal parameters. No other follow-up for BMI required.      Physical Exam  Vitals and nursing note reviewed.   Constitutional:       Appearance: Normal appearance.   Cardiovascular:      Rate and Rhythm: Normal rate and regular rhythm.      Pulses: Normal pulses.      Heart sounds: Normal heart sounds.   Pulmonary:      Effort: Pulmonary effort is normal.      Breath sounds: Normal breath sounds.   Musculoskeletal:      Left shoulder: Deformity, tenderness, bony tenderness and crepitus present. Decreased range of motion. Decreased strength.   Neurological:    " "  Mental Status: She is alert.        Result Review :                Assessment and Plan   Diagnoses and all orders for this visit:    1. Chronic left shoulder pain (Primary)  -     XR Shoulder 2+ View Left  -     celecoxib (CeleBREX) 200 MG capsule; Take 1 capsule by mouth Daily.  Dispense: 30 capsule; Refill: 1         This patient has a PCP that is the continuing focal point for all health care services, and the patient sees this physician to be evaluated for shoulder pain. The inherent complexity that this code () captures is not in the clinical condition itself-- shoulder pain --but rather the cognitition of the continued responsibility of being the focal point for all needed services for this patient.\"     Follow Up   Return in about 1 month (around 8/28/2025), or if symptoms worsen or fail to improve, for Recheck.  Patient was given instructions and counseling regarding her condition or for health maintenance advice. Please see specific information pulled into the AVS if appropriate.             "

## 2025-07-30 ENCOUNTER — RESULTS FOLLOW-UP (OUTPATIENT)
Dept: INTERNAL MEDICINE | Facility: CLINIC | Age: 75
End: 2025-07-30
Payer: MEDICARE

## 2025-07-30 DIAGNOSIS — G89.29 CHRONIC LEFT SHOULDER PAIN: Primary | ICD-10-CM

## 2025-07-30 DIAGNOSIS — M25.512 CHRONIC LEFT SHOULDER PAIN: Primary | ICD-10-CM

## 2025-08-07 ENCOUNTER — TELEPHONE (OUTPATIENT)
Dept: INTERNAL MEDICINE | Facility: CLINIC | Age: 75
End: 2025-08-07
Payer: MEDICARE

## 2025-08-27 ENCOUNTER — OFFICE VISIT (OUTPATIENT)
Dept: SPORTS MEDICINE | Facility: CLINIC | Age: 75
End: 2025-08-27
Payer: MEDICARE

## 2025-08-27 VITALS
OXYGEN SATURATION: 99 % | DIASTOLIC BLOOD PRESSURE: 70 MMHG | HEART RATE: 78 BPM | HEIGHT: 63 IN | WEIGHT: 124 LBS | SYSTOLIC BLOOD PRESSURE: 112 MMHG | RESPIRATION RATE: 16 BRPM | BODY MASS INDEX: 21.97 KG/M2

## 2025-08-27 DIAGNOSIS — M19.012 PRIMARY OSTEOARTHRITIS, LEFT SHOULDER: ICD-10-CM

## 2025-08-27 DIAGNOSIS — G89.29 CHRONIC LEFT SHOULDER PAIN: Primary | ICD-10-CM

## 2025-08-27 DIAGNOSIS — M25.512 CHRONIC LEFT SHOULDER PAIN: Primary | ICD-10-CM

## 2025-08-27 RX ORDER — TRIAMCINOLONE ACETONIDE 40 MG/ML
80 INJECTION, SUSPENSION INTRA-ARTICULAR; INTRAMUSCULAR
Status: COMPLETED | OUTPATIENT
Start: 2025-08-27 | End: 2025-08-27

## 2025-08-27 RX ADMIN — TRIAMCINOLONE ACETONIDE 80 MG: 40 INJECTION, SUSPENSION INTRA-ARTICULAR; INTRAMUSCULAR at 09:33

## (undated) DEVICE — FRCP BX RADJAW4 NDL 2.8 240CM LG OG BX40

## (undated) DEVICE — CANN NASL CO2 TRULINK W/O2 A/

## (undated) DEVICE — BITEBLOCK OMNI BLOC

## (undated) DEVICE — SENSR O2 OXIMAX FNGR A/ 18IN NONSTR

## (undated) DEVICE — TUBING, SUCTION, 1/4" X 10', STRAIGHT: Brand: MEDLINE

## (undated) DEVICE — KT ORCA ORCAPOD DISP STRL

## (undated) DEVICE — Device: Brand: DEFENDO AIR/WATER/SUCTION AND BIOPSY VALVE

## (undated) DEVICE — CANN O2 ETCO2 FITS ALL CONN CO2 SMPL A/ 7IN DISP LF

## (undated) DEVICE — THE TORRENT IRRIGATION SCOPE CONNECTOR IS USED WITH THE TORRENT IRRIGATION TUBING TO PROVIDE IRRIGATION FLUIDS SUCH AS STERILE WATER DURING GASTROINTESTINAL ENDOSCOPIC PROCEDURES WHEN USED IN CONJUNCTION WITH AN IRRIGATION PUMP (OR ELECTROSURGICAL UNIT).: Brand: TORRENT

## (undated) DEVICE — LN SMPL CO2 SHTRM SD STREAM W/M LUER

## (undated) DEVICE — ADAPT CLN BIOGUARD AIR/H2O DISP